# Patient Record
Sex: FEMALE | Race: BLACK OR AFRICAN AMERICAN | NOT HISPANIC OR LATINO | Employment: UNEMPLOYED | URBAN - METROPOLITAN AREA
[De-identification: names, ages, dates, MRNs, and addresses within clinical notes are randomized per-mention and may not be internally consistent; named-entity substitution may affect disease eponyms.]

---

## 2018-03-16 ENCOUNTER — OFFICE VISIT (OUTPATIENT)
Dept: OBSTETRICS AND GYNECOLOGY | Facility: CLINIC | Age: 43
End: 2018-03-16
Payer: MEDICAID

## 2018-03-16 VITALS
BODY MASS INDEX: 48.74 KG/M2 | DIASTOLIC BLOOD PRESSURE: 86 MMHG | HEIGHT: 65 IN | SYSTOLIC BLOOD PRESSURE: 132 MMHG | WEIGHT: 292.56 LBS

## 2018-03-16 DIAGNOSIS — Z11.3 SCREENING FOR STD (SEXUALLY TRANSMITTED DISEASE): ICD-10-CM

## 2018-03-16 DIAGNOSIS — Z12.39 SCREENING FOR BREAST CANCER: ICD-10-CM

## 2018-03-16 DIAGNOSIS — Z12.4 SCREENING FOR CERVICAL CANCER: ICD-10-CM

## 2018-03-16 DIAGNOSIS — N92.6 IRREGULAR BLEEDING: ICD-10-CM

## 2018-03-16 DIAGNOSIS — Z01.419 WELL WOMAN EXAM WITH ROUTINE GYNECOLOGICAL EXAM: Primary | ICD-10-CM

## 2018-03-16 PROCEDURE — 88305 TISSUE EXAM BY PATHOLOGIST: CPT | Mod: 26,,, | Performed by: PATHOLOGY

## 2018-03-16 PROCEDURE — 88305 TISSUE EXAM BY PATHOLOGIST: CPT | Performed by: PATHOLOGY

## 2018-03-16 PROCEDURE — 87491 CHLMYD TRACH DNA AMP PROBE: CPT

## 2018-03-16 PROCEDURE — 99386 PREV VISIT NEW AGE 40-64: CPT | Mod: S$PBB,,, | Performed by: OBSTETRICS & GYNECOLOGY

## 2018-03-16 PROCEDURE — 88175 CYTOPATH C/V AUTO FLUID REDO: CPT

## 2018-03-16 PROCEDURE — 58100 BIOPSY OF UTERUS LINING: CPT | Mod: PBBFAC,PO | Performed by: OBSTETRICS & GYNECOLOGY

## 2018-03-16 PROCEDURE — 99203 OFFICE O/P NEW LOW 30 MIN: CPT | Mod: PBBFAC,PO | Performed by: OBSTETRICS & GYNECOLOGY

## 2018-03-16 PROCEDURE — 58100 BIOPSY OF UTERUS LINING: CPT | Mod: S$PBB,,, | Performed by: OBSTETRICS & GYNECOLOGY

## 2018-03-16 PROCEDURE — 99999 PR PBB SHADOW E&M-NEW PATIENT-LVL III: CPT | Mod: PBBFAC,,, | Performed by: OBSTETRICS & GYNECOLOGY

## 2018-03-16 PROCEDURE — 87624 HPV HI-RISK TYP POOLED RSLT: CPT

## 2018-03-16 RX ORDER — BIOTIN 1 MG
1000 TABLET ORAL 3 TIMES DAILY
COMMUNITY
End: 2018-06-19

## 2018-03-16 RX ORDER — METFORMIN HYDROCHLORIDE 750 MG/1
750 TABLET, EXTENDED RELEASE ORAL
COMMUNITY
End: 2022-09-26

## 2018-03-16 RX ORDER — ACETAMINOPHEN 500 MG
5000 TABLET ORAL DAILY
COMMUNITY

## 2018-03-16 RX ORDER — ASPIRIN 81 MG/1
81 TABLET ORAL DAILY
COMMUNITY
End: 2018-07-09

## 2018-03-16 RX ORDER — AMITRIPTYLINE HYDROCHLORIDE 50 MG/1
50 TABLET, FILM COATED ORAL NIGHTLY
COMMUNITY
End: 2018-07-09

## 2018-03-16 RX ORDER — BUTALBITAL, ACETAMINOPHEN, CAFFEINE AND CODEINE PHOSPHATE 50; 325; 40; 30 MG/1; MG/1; MG/1; MG/1
CAPSULE ORAL
COMMUNITY
End: 2018-06-19 | Stop reason: SDUPTHER

## 2018-03-16 RX ORDER — GARLIC 1000 MG
CAPSULE ORAL
COMMUNITY
End: 2021-02-23

## 2018-03-16 RX ORDER — ATORVASTATIN CALCIUM 10 MG/1
10 TABLET, FILM COATED ORAL DAILY
COMMUNITY
End: 2018-06-19

## 2018-03-16 RX ORDER — CYCLOBENZAPRINE HCL 10 MG
10 TABLET ORAL 3 TIMES DAILY PRN
COMMUNITY
End: 2021-02-23

## 2018-03-16 RX ORDER — TOPIRAMATE 100 MG/1
100 TABLET, FILM COATED ORAL 2 TIMES DAILY
COMMUNITY

## 2018-03-16 RX ORDER — NAPROXEN 500 MG/1
500 TABLET ORAL 2 TIMES DAILY
COMMUNITY
End: 2018-07-09

## 2018-03-16 RX ORDER — LISINOPRIL AND HYDROCHLOROTHIAZIDE 12.5; 2 MG/1; MG/1
1 TABLET ORAL DAILY
COMMUNITY
End: 2022-09-26

## 2018-03-16 RX ORDER — GABAPENTIN 400 MG/1
600 CAPSULE ORAL 3 TIMES DAILY
COMMUNITY
End: 2022-09-26

## 2018-03-16 NOTE — PATIENT INSTRUCTIONS
Endometrial Biopsy    Endometrial biopsy is a procedure used to study the endometrium (lining of the uterus). It is usually done in your health care providers office. During the biopsy, small tissue samples are taken from inside the uterus. These are then sent to a lab for study. If any problems are found, you and your health care provider will discuss treatment options. The biopsy usually takes only a few minutes, and you can often go back to your normal routine as soon as the procedure is over.  Reasons for the procedure  Endometrial biopsy may help pinpoint the cause of certain problems. These include:  · Bleeding after menopause  · Heavy or irregular menstrual periods  · Bleeding associated with hormone therapy  · Prolonged bleeding  · Abnormal Pap test results  · Having certain types of cancer  · Trouble getting pregnant (fertility problems)  What are the risks?  Problems with endometrial biopsy are rare, but can include:  · Bleeding  · Infection  · Damage to the uterine wall (very rare)  Getting ready for the procedure  Your health care provider will ask about your health and any medicines you take, like blood thinners. Before your biopsy, you may have tests to make sure youre not pregnant or have an infection. You may also be asked to sign a consent form. A day or 2 before the procedure:   · Avoid using creams or other vaginal medicines.  · Avoid douching.  · Ask your health care provider if you should take pain medicines shortly before the test.  During the biopsy  During the biopsy, you will likely experience the following:  · You will be asked to lie on an exam table with your knees bent, just as you do for a Pap test.  · You may have a brief pelvic exam. An instrument called a speculum is then inserted into the vagina to hold it open.  · An antiseptic solution may be applied to the cervix. The cervix may also be numbed with an anesthetic or dilated to widen the opening.  · A small tube is passed  through the cervix into the uterus.  · It is normal to feel some cramping when the tube is inserted. But tell your health care provider if you have severe cramping or are very uncomfortable.  · Using mild suction, samples are taken from the uterine lining. You may feel pinching or additional cramping when this is done.  · The tube and speculum are then removed and the samples are sent to a lab for study.  After the procedure  After the procedure, you may experience the following:  · If you feel lightheaded or dizzy, you can rest on the table until youre ready to get dressed.  · For a few hours, you may feel some mild cramping. This can usually be relieved with over-the-counter pain medicines.  · You may have some bleeding for a few days. Use pads instead of tampons.  · Dont douche or use any vaginal medicines unless your health care provider says its OK.  · Ask your health care provider when its OK to have sex again.  Follow-up care  It will take about a week for the biopsy results to come back from the lab. Then you and your health care provider can discuss the results. These may show that no treatment is required. Or, you may be scheduled for a follow-up appointment and more tests. If your biopsy was done for fertility problems, be sure to record the day when your next period begins.     Call your health care provider   Contact your health care provider if you have any of the following:  · Heavy bleeding (more than a pad an hour for 2 hours).  · Severe cramping or increasing pain.  · Fever over 100.4°F (38.0°C)  · Foul-smelling or unusual vaginal discharge.   Date Last Reviewed: 5/10/2015  © 1356-5394 [employer]. [address] [place] [address], [place] [address]. All rights reserved. This information is not intended as a substitute for professional medical care. Always follow your healthcare professional's instructions.

## 2018-03-16 NOTE — PROGRESS NOTES
GYNECOLOGY OFFICE NOTE    Reason for visit: annual    HPI: Pt is a 42 y.o.  female  who presents for annual. Cycle: menarche- 12, Interval- variable may skip 3 months- x 10yrs, Duration- 4 days, Flow- normal, reports dysmenorrhea (alleviated with naproxen/ibuprofen). States she didn't have a cycle since Dec. Took provera and had spotting this month but has been bleeding variable since. She is not sexually active.  She uses no method for contraception. She does desire STI screening. She denies vaginal discharge.  Last pap: , reports hx of abnormal- had normal biopsy. Desires annual paps.    Past Medical History:   Diagnosis Date    Diabetes mellitus     Hyperlipidemia     Hypertension        Past Surgical History:   Procedure Laterality Date    CHOLECYSTECTOMY  1998    UMBILICAL HERNIA REPAIR  2001       Family History   Problem Relation Age of Onset    Breast cancer Mother     Breast cancer Maternal Cousin     Breast cancer Maternal Aunt     Colon cancer Neg Hx     Ovarian cancer Neg Hx        Social History   Substance Use Topics    Smoking status: Never Smoker    Smokeless tobacco: Never Used    Alcohol use Yes      Comment: Socially       OB History    Para Term  AB Living   2 2       2   SAB TAB Ectopic Multiple Live Births           2      # Outcome Date GA Lbr Aurelio/2nd Weight Sex Delivery Anes PTL Lv   2 Para     F Vag-Spont   VICKIE   1 Para     M Vag-Spont   VICKIE          Current Outpatient Prescriptions   Medication Sig    amitriptyline (ELAVIL) 50 MG tablet Take 50 mg by mouth every evening.    aspirin (ECOTRIN) 81 MG EC tablet Take 81 mg by mouth once daily.    atorvastatin (LIPITOR) 10 MG tablet Take 10 mg by mouth once daily.    biotin 1 mg tablet Take 1,000 mcg by mouth 3 (three) times daily.    butalbital-acetaminop-caf-cod -03-30 mg Cap Take by mouth as needed.    cholecalciferol, vitamin D3, (VITAMIN D3) 5,000 unit Tab Take 5,000 Units by mouth  "once daily.    cyclobenzaprine (FLEXERIL) 10 MG tablet Take 10 mg by mouth 3 (three) times daily as needed for Muscle spasms.    gabapentin (NEURONTIN) 400 MG capsule Take 400 mg by mouth 3 (three) times daily.    garlic 1,000 mg Cap Take by mouth.    lisinopril-hydrochlorothiazide (PRINZIDE,ZESTORETIC) 20-12.5 mg per tablet Take 1 tablet by mouth once daily.    metFORMIN (GLUCOPHAGE-XR) 750 MG 24 hr tablet Take 750 mg by mouth daily with breakfast.    naproxen (EC NAPROSYN) 500 MG EC tablet Take 500 mg by mouth 2 (two) times daily.    ranitidine (ZANTAC) 150 MG tablet Take 150 mg by mouth 2 (two) times daily.    topiramate (TOPAMAX) 100 MG tablet Take 100 mg by mouth 2 (two) times daily.    TRAMADOL HCL (TRAMADOL ORAL) Take by mouth as needed (50mg).     No current facility-administered medications for this visit.        Allergies: Patient has no known allergies.     /86   Ht 5' 5" (1.651 m)   Wt 132.7 kg (292 lb 8.8 oz)   LMP 03/08/2018 (Exact Date)   BMI 48.68 kg/m²     ROS:  GENERAL: Denies fever or chills.   SKIN: Denies rash or lesions.   HEAD: Denies head injury or headache.   CHEST: Denies chest pain or shortness of breath.   CARDIOVASCULAR: Denies palpitations or chest pain.   ABDOMEN: No abdominal pain, constipation, diarrhea, nausea, vomiting or rectal bleeding.   URINARY: No dysuria, hematuria, or burning on urination.  REPRODUCTIVE: See HPI.   BREASTS: Denies pain, lumps, or nipple discharge.   NEUROLOGIC: Denies syncope or weakness.     Physical Exam:  GENERAL: alert, appears stated age and cooperative  CHEST: Normal respiratory effort  HEART: S1 and S2 normal, regular rate and rhythm  NECK: normal appearance, no thyromegaly masses or tenderness  SKIN: no acne, striae, hirsutism  BREAST EXAM: breasts appear normal, no suspicious masses, no skin or nipple changes or axillary nodes  ABDOMEN: abdomen is soft without significant tenderness, masses, organomegaly or guarding, no hernias " noted  EXTERNAL GENITALIA:  normal general appearance  URETHRA: normal urethra, normal urethral meatus  VAGINA:  normal mucosa without prolapse or lesions, minimal blood in vault  CERVIX:  Normal  UTERUS:  exam limited by habitus  ADNEXA:  normal adnexa in size, nontender and no masses      DATE: March16th, 2018    TIME: 1000 AM    PROCEDURE: Endometrial biopsy    INDICATION: AUB-N    PATIENT CONSENT:     PRE ENDOMETRIAL BIOPSY COUNSELING:The patient was informed of the risk of bleeding, infection, uterine perforation and pain and that the test will rule-out endometrial cancer with accuracy greater than 95%. She was counseled on the alternatives to endometrial biopsy.  All of her questions were answered. Counseling lasted approximately 15 minutes and all her questions were answered.    Patient gives verbal and written consent    ANESTHESIA: None    PROCEDURE NOTE:  The cervix was visualized with a speculum and swabbed with Betadinex3.  A sterile endometrial pipelle was inserted into the uterus to a sound length of 9 cm. 2 passes were made with the pipelle and light amount of tissue was obtained. The specimen was placed in formalin and sent to Pathology for evaluation.     COMPLICATIONS: None    DISPOSITION: The patient tolerated the above procedure well.    POST ENDOMETRIAL BIOPSY COUNSELING:  - Manage post biopsy cramping with NSAIDs or Tylenol.  - Expect spotting or light bleeding for a few days.  - Report bleeding heavier than a period, fever > 101.0 F, worsening pain or a foul smelling vaginal discharge.    Diagnosis:  1. Well woman exam with routine gynecological exam    2. Irregular bleeding    3. Screening for STD (sexually transmitted disease)    4. Screening for breast cancer    5. Screening for cervical cancer        Plan:   1. Annual  2. F/u U/S but discussed differential- obesity, chronic anovulation.  Treatment options also discussed. Will await EMBX and U/S results  3. F/u gc/ct  4. MMG orderd  5.  Pap/hpv today    Orders Placed This Encounter    C. trachomatis/N. gonorrhoeae by AMP DNA Cervix    HPV High Risk Genotypes, PCR    Mammo Digital Screening Bilat with CAD    Liquid-based pap smear, screening    Tissue Specimen To Pathology, Obstetrics/Gynecology    US OB/GYN Procedure ([facility])       Patient was counseled today on the new ACS guidelines for cervical cytology screening as well as the current recommendations for breast cancer screening. She was counseled to follow up with her PCP for other routine health maintenance.     Follow-up for transvaginal ultrasound, results visit.      Tiffanie Rhodes MD  OB/GYN  Pager: [phone]

## 2018-03-17 LAB
C TRACH DNA SPEC QL NAA+PROBE: NOT DETECTED
N GONORRHOEA DNA SPEC QL NAA+PROBE: NOT DETECTED

## 2018-03-22 LAB
HPV16 AG SPEC QL: NEGATIVE
HPV16+18+H RISK 12 DNA CVX-IMP: NEGATIVE
HPV18 DNA SPEC QL NAA+PROBE: NEGATIVE

## 2018-03-23 ENCOUNTER — PROCEDURE VISIT (OUTPATIENT)
Dept: OBSTETRICS AND GYNECOLOGY | Facility: CLINIC | Age: 43
End: 2018-03-23
Payer: MEDICAID

## 2018-03-23 DIAGNOSIS — N92.6 IRREGULAR BLEEDING: ICD-10-CM

## 2018-03-23 PROCEDURE — 76830 TRANSVAGINAL US NON-OB: CPT | Mod: 26,S$PBB,, | Performed by: OBSTETRICS & GYNECOLOGY

## 2018-03-23 PROCEDURE — 76830 TRANSVAGINAL US NON-OB: CPT | Mod: PBBFAC,PO

## 2018-03-23 RX ORDER — CLOTRIMAZOLE AND BETAMETHASONE DIPROPIONATE 10; .64 MG/G; MG/G
CREAM TOPICAL
Qty: 15 G | Refills: 1 | Status: SHIPPED | OUTPATIENT
Start: 2018-03-23 | End: 2019-03-23

## 2018-03-23 NOTE — TELEPHONE ENCOUNTER
Spoke with pt in clinic after her U/S. Informed pt  won't be in clinic today due to an accident. Informed pt I will be in contact next week on Monday with a new appt date and time. Pt reports vaginal itching externally, referred pt to  On call, Lotrisone cream called in. Patient verbalized understanding.

## 2018-03-24 ENCOUNTER — HOSPITAL ENCOUNTER (OUTPATIENT)
Dept: RADIOLOGY | Facility: HOSPITAL | Age: 43
Discharge: HOME | End: 2018-03-24
Attending: OBSTETRICS & GYNECOLOGY
Payer: MEDICAID

## 2018-03-24 DIAGNOSIS — Z12.39 SCREENING FOR BREAST CANCER: ICD-10-CM

## 2018-03-24 PROCEDURE — 77067 SCR MAMMO BI INCL CAD: CPT | Mod: 26,,, | Performed by: RADIOLOGY

## 2018-03-24 PROCEDURE — 77067 SCR MAMMO BI INCL CAD: CPT | Mod: TC

## 2018-03-27 ENCOUNTER — TELEPHONE (OUTPATIENT)
Dept: RADIOLOGY | Facility: HOSPITAL | Age: 43
End: 2018-03-27

## 2018-04-09 ENCOUNTER — PATIENT MESSAGE (OUTPATIENT)
Dept: OBSTETRICS AND GYNECOLOGY | Facility: CLINIC | Age: 43
End: 2018-04-09

## 2018-04-09 ENCOUNTER — TELEPHONE (OUTPATIENT)
Dept: RADIOLOGY | Facility: HOSPITAL | Age: 43
End: 2018-04-09

## 2018-04-09 ENCOUNTER — HOSPITAL ENCOUNTER (OUTPATIENT)
Dept: RADIOLOGY | Facility: HOSPITAL | Age: 43
Discharge: HOME | End: 2018-04-09
Attending: OBSTETRICS & GYNECOLOGY
Payer: MEDICAID

## 2018-04-09 DIAGNOSIS — R92.8 ABNORMAL MAMMOGRAM: ICD-10-CM

## 2018-04-09 PROCEDURE — 77061 BREAST TOMOSYNTHESIS UNI: CPT | Mod: TC

## 2018-04-09 PROCEDURE — 77061 BREAST TOMOSYNTHESIS UNI: CPT | Mod: 26,,, | Performed by: RADIOLOGY

## 2018-04-09 PROCEDURE — 77065 DX MAMMO INCL CAD UNI: CPT | Mod: 26,,, | Performed by: RADIOLOGY

## 2018-04-09 PROCEDURE — 77065 DX MAMMO INCL CAD UNI: CPT | Mod: TC

## 2018-04-10 NOTE — TELEPHONE ENCOUNTER
Pt states she is not interested in pills and would like to know what surgery would be done. Pt is also inquiring about her U/S results.

## 2018-04-16 ENCOUNTER — TELEPHONE (OUTPATIENT)
Dept: RADIOLOGY | Facility: HOSPITAL | Age: 43
End: 2018-04-16

## 2018-04-16 NOTE — TELEPHONE ENCOUNTER
Spoke with patient. Reviewed breast biopsy procedure and reviewed instructions for breast biopsy. Patient expressed understanding and all questions were answered. Provided patient with my phone number to call for any further concerns or questions.   Patient scheduled breast biopsy at the [facility] on 4/23/18.

## 2018-04-23 ENCOUNTER — HOSPITAL ENCOUNTER (OUTPATIENT)
Dept: RADIOLOGY | Facility: HOSPITAL | Age: 43
Discharge: HOME | End: 2018-04-23
Attending: OBSTETRICS & GYNECOLOGY
Payer: MEDICAID

## 2018-04-23 DIAGNOSIS — R92.8 ABNORMAL MAMMOGRAM: ICD-10-CM

## 2018-04-23 PROCEDURE — 88305 TISSUE EXAM BY PATHOLOGIST: CPT | Mod: 26,,, | Performed by: PATHOLOGY

## 2018-04-23 PROCEDURE — 19081 BX BREAST 1ST LESION STRTCTC: CPT | Mod: RT,,, | Performed by: RADIOLOGY

## 2018-04-23 PROCEDURE — 27201044 MAMMO BREAST STEREOTACTIC BREAST BIOPSY RIGHT: Mod: PO

## 2018-04-23 PROCEDURE — 19081 BX BREAST 1ST LESION STRTCTC: CPT | Mod: TC,PO,RT

## 2018-04-23 PROCEDURE — 88305 TISSUE EXAM BY PATHOLOGIST: CPT | Performed by: PATHOLOGY

## 2018-04-24 ENCOUNTER — TELEPHONE (OUTPATIENT)
Dept: RADIOLOGY | Facility: HOSPITAL | Age: 43
End: 2018-04-24

## 2018-06-06 ENCOUNTER — TELEPHONE (OUTPATIENT)
Dept: OBSTETRICS AND GYNECOLOGY | Facility: CLINIC | Age: 43
End: 2018-06-06

## 2018-06-06 NOTE — TELEPHONE ENCOUNTER
Returned call, apologized about the delay, pt states she also had some things going on in her life that took her focus away from treatment here in clinic. Pt states she is not interested in pills or the IUD and would like more information on surgery. Pt is also requesting the results from her recent U/S, she states when the tech was doing the procedure she seemed really concerned but didn't give her much information and it scared her.

## 2018-06-06 NOTE — TELEPHONE ENCOUNTER
----- Message from Kiesha Laureano sent at 6/6/2018  3:50 PM CDT -----  Contact: self, [phone]  Patient states she is very disappointed. States she was told by nurse on 4/9 she would get a call back and has yet to be called. Please advise.

## 2018-06-07 NOTE — TELEPHONE ENCOUNTER
Contacted pt and scheduled for appt to discuss surgery options on 6/12 at 1:15pm. Patient verbalized understanding.

## 2018-06-07 NOTE — TELEPHONE ENCOUNTER
I sent her ultrasound results and explanation through [employer] so she should have access to them there and I also explained that if she wanted to discuss surgical options I am more than happy to see her in clinic     Tiffanie Rhodes MD, FACOG  OB/GYN  Pager: [phone]'

## 2018-06-19 ENCOUNTER — OFFICE VISIT (OUTPATIENT)
Dept: OBSTETRICS AND GYNECOLOGY | Facility: CLINIC | Age: 43
End: 2018-06-19
Payer: MEDICAID

## 2018-06-19 VITALS
DIASTOLIC BLOOD PRESSURE: 86 MMHG | WEIGHT: 283.31 LBS | SYSTOLIC BLOOD PRESSURE: 128 MMHG | BODY MASS INDEX: 47.14 KG/M2

## 2018-06-19 DIAGNOSIS — N92.6 IRREGULAR BLEEDING: ICD-10-CM

## 2018-06-19 DIAGNOSIS — N83.201 RIGHT OVARIAN CYST: Primary | ICD-10-CM

## 2018-06-19 PROCEDURE — 99999 PR PBB SHADOW E&M-EST. PATIENT-LVL III: CPT | Mod: PBBFAC,,, | Performed by: OBSTETRICS & GYNECOLOGY

## 2018-06-19 PROCEDURE — 99213 OFFICE O/P EST LOW 20 MIN: CPT | Mod: PBBFAC,PO | Performed by: OBSTETRICS & GYNECOLOGY

## 2018-06-19 PROCEDURE — 99212 OFFICE O/P EST SF 10 MIN: CPT | Mod: S$PBB,,, | Performed by: OBSTETRICS & GYNECOLOGY

## 2018-06-19 RX ORDER — CETIRIZINE HYDROCHLORIDE 10 MG/1
TABLET ORAL
COMMUNITY
Start: 2018-04-18 | End: 2021-04-06

## 2018-06-19 RX ORDER — LANCETS 30 GAUGE
EACH MISCELLANEOUS
COMMUNITY
Start: 2018-04-12

## 2018-06-19 RX ORDER — BUTALBITAL, ACETAMINOPHEN AND CAFFEINE 50; 325; 40 MG/1; MG/1; MG/1
TABLET ORAL
COMMUNITY
Start: 2018-04-30 | End: 2023-10-03

## 2018-06-19 RX ORDER — PIOGLITAZONEHYDROCHLORIDE 30 MG/1
TABLET ORAL
COMMUNITY
Start: 2018-06-18 | End: 2019-05-15

## 2018-06-19 RX ORDER — MEDROXYPROGESTERONE ACETATE 10 MG/1
10 TABLET ORAL DAILY
Qty: 30 TABLET | Refills: 4 | Status: SHIPPED | OUTPATIENT
Start: 2018-06-19 | End: 2018-07-09

## 2018-06-19 NOTE — PROGRESS NOTES
GYNECOLOGY OFFICE NOTE    Reason for visit:follow-up    HPI: Pt is a 43 y.o.  female  who presents for f/u from U/S for irregular bleeding. No cycle since April- duration 5 days. We discussed right ovarian cyst and need for repeat U/S.we discussed again chronic anovulation and therapy options (HTN)- progestin only pills (continous vs 10days vs IUD).     Previous HPI: Cycle: menarche- 12, Interval- variable may skip 3 months- x 10yrs, Duration- 4 days, Flow- normal, reports dysmenorrhea (alleviated with naproxen/ibuprofen). States she didn't have a cycle since Dec. Took provera and had spotting this month but has been bleeding variable since. She is not sexually active.  She uses no method for contraception. She does desire STI screening. She denies vaginal discharge.  Last pap: , reports hx of abnormal- had normal biopsy. Desires annual paps.    Past Medical History:   Diagnosis Date    Diabetes mellitus     Hyperlipidemia     Hypertension        Past Surgical History:   Procedure Laterality Date    CHOLECYSTECTOMY  1998    UMBILICAL HERNIA REPAIR  2001       Family History   Problem Relation Age of Onset    Breast cancer Mother     Breast cancer Maternal Cousin     Breast cancer Maternal Aunt     Colon cancer Neg Hx     Ovarian cancer Neg Hx        Social History   Substance Use Topics    Smoking status: Never Smoker    Smokeless tobacco: Never Used    Alcohol use Yes      Comment: Socially       OB History    Para Term  AB Living   4 4 2     2   SAB TAB Ectopic Multiple Live Births           2      # Outcome Date GA Lbr Aurelio/2nd Weight Sex Delivery Anes PTL Lv   4 Term            3 Term            2 Para     F Vag-Spont   VICKIE   1 Para     M Vag-Spont   VICKIE          Current Outpatient Prescriptions   Medication Sig    ACCU-CHEK FASTCLIX Misc     amitriptyline (ELAVIL) 50 MG tablet Take 50 mg by mouth every evening.    aspirin (ECOTRIN) 81 MG EC tablet Take 81 mg by  mouth once daily.    butalbital-acetaminophen-caffeine -40 mg (FIORICET, ESGIC) -40 mg per tablet     cetirizine (ZYRTEC) 10 MG tablet     cholecalciferol, vitamin D3, (VITAMIN D3) 5,000 unit Tab Take 5,000 Units by mouth once daily.    clotrimazole-betamethasone 1-0.05% (LOTRISONE) cream Apply to affected area 2 times daily    cyclobenzaprine (FLEXERIL) 10 MG tablet Take 10 mg by mouth 3 (three) times daily as needed for Muscle spasms.    gabapentin (NEURONTIN) 400 MG capsule Take 400 mg by mouth 3 (three) times daily.    garlic 1,000 mg Cap Take by mouth.    lisinopril-hydrochlorothiazide (PRINZIDE,ZESTORETIC) 20-12.5 mg per tablet Take 1 tablet by mouth once daily.    metFORMIN (GLUCOPHAGE-XR) 750 MG 24 hr tablet Take 750 mg by mouth daily with breakfast.    naproxen (EC NAPROSYN) 500 MG EC tablet Take 500 mg by mouth 2 (two) times daily.    ranitidine (ZANTAC) 150 MG tablet Take 150 mg by mouth 2 (two) times daily.    topiramate (TOPAMAX) 100 MG tablet Take 100 mg by mouth 2 (two) times daily.    TRAMADOL HCL (TRAMADOL ORAL) Take by mouth as needed (50mg).    medroxyPROGESTERone (PROVERA) 10 MG tablet Take 1 tablet (10 mg total) by mouth once daily.    ONETOUCH ULTRA BLUE TEST STRIP Strp     ONETOUCH ULTRA2 kit     pioglitazone (ACTOS) 30 MG tablet      No current facility-administered medications for this visit.        Allergies: Patient has no known allergies.     /86   Wt 128.5 kg (283 lb 4.7 oz)   LMP 04/15/2018 (Exact Date)   BMI 47.14 kg/m²     ROS:  GENERAL: Denies fever or chills.   SKIN: Denies rash or lesions.   HEAD: Denies head injury or headache.   CHEST: Denies chest pain or shortness of breath.   CARDIOVASCULAR: Denies palpitations or chest pain.   ABDOMEN: No abdominal pain, constipation, diarrhea, nausea, vomiting or rectal bleeding.   URINARY: No dysuria, hematuria, or burning on urination.  REPRODUCTIVE: See HPI.   BREASTS: Denies pain, lumps, or nipple  discharge.   NEUROLOGIC: Denies syncope or weakness.     Physical Exam:  GENERAL: alert, appears stated age and cooperative  CHEST: Normal respiratory effort  HEART: S1 and S2 normal, regular rate and rhythm  NECK: normal appearance, no thyromegaly masses or tenderness  SKIN: no acne, striae, hirsutism  Talk only    Diagnosis:  1. Right ovarian cyst    2. Irregular bleeding        Plan:   1.F/U U/s ordered- asymptomatic  2. Discussed provera and rx sent    Orders Placed This Encounter    medroxyPROGESTERone (PROVERA) 10 MG tablet    US OB/GYN Procedure ([facility])       Patient was counseled today on the new ACS guidelines for cervical cytology screening as well as the current recommendations for breast cancer screening. She was counseled to follow up with her PCP for other routine health maintenance.     Follow-up for ultrasound.      Tiffanie Rhodes MD  OB/GYN  Pager: [phone]

## 2018-07-09 ENCOUNTER — PROCEDURE VISIT (OUTPATIENT)
Dept: OBSTETRICS AND GYNECOLOGY | Facility: CLINIC | Age: 43
End: 2018-07-09
Payer: MEDICAID

## 2018-07-09 ENCOUNTER — OFFICE VISIT (OUTPATIENT)
Dept: OBSTETRICS AND GYNECOLOGY | Facility: CLINIC | Age: 43
End: 2018-07-09
Payer: MEDICAID

## 2018-07-09 ENCOUNTER — LAB VISIT (OUTPATIENT)
Dept: LAB | Facility: HOSPITAL | Age: 43
End: 2018-07-09
Attending: OBSTETRICS & GYNECOLOGY
Payer: MEDICAID

## 2018-07-09 VITALS
BODY MASS INDEX: 46.58 KG/M2 | WEIGHT: 279.56 LBS | SYSTOLIC BLOOD PRESSURE: 120 MMHG | HEIGHT: 65 IN | DIASTOLIC BLOOD PRESSURE: 80 MMHG

## 2018-07-09 DIAGNOSIS — N83.8 OVARIAN MASS, RIGHT: Primary | ICD-10-CM

## 2018-07-09 DIAGNOSIS — N83.201 RIGHT OVARIAN CYST: ICD-10-CM

## 2018-07-09 DIAGNOSIS — N83.201 RIGHT OVARIAN CYST: Primary | ICD-10-CM

## 2018-07-09 LAB — CANCER AG125 SERPL-ACNC: 29 U/ML

## 2018-07-09 PROCEDURE — 99214 OFFICE O/P EST MOD 30 MIN: CPT | Mod: PBBFAC,PO | Performed by: OBSTETRICS & GYNECOLOGY

## 2018-07-09 PROCEDURE — 99212 OFFICE O/P EST SF 10 MIN: CPT | Mod: S$PBB,25,, | Performed by: OBSTETRICS & GYNECOLOGY

## 2018-07-09 PROCEDURE — 76830 TRANSVAGINAL US NON-OB: CPT | Mod: PBBFAC,PO

## 2018-07-09 PROCEDURE — 86304 IMMUNOASSAY TUMOR CA 125: CPT

## 2018-07-09 PROCEDURE — 36415 COLL VENOUS BLD VENIPUNCTURE: CPT

## 2018-07-09 PROCEDURE — 76830 TRANSVAGINAL US NON-OB: CPT | Mod: 26,S$PBB,, | Performed by: OBSTETRICS & GYNECOLOGY

## 2018-07-09 PROCEDURE — 99999 PR PBB SHADOW E&M-EST. PATIENT-LVL IV: CPT | Mod: PBBFAC,,, | Performed by: OBSTETRICS & GYNECOLOGY

## 2018-07-09 RX ORDER — TOPIRAMATE 100 MG/1
2 TABLET, FILM COATED ORAL
COMMUNITY
End: 2018-07-09

## 2018-07-09 RX ORDER — PYRIDOXINE HCL (VITAMIN B6) 100 MG
TABLET ORAL
COMMUNITY
End: 2019-05-15

## 2018-07-09 RX ORDER — MELOXICAM 7.5 MG/1
1 TABLET ORAL
COMMUNITY
End: 2018-07-09

## 2018-07-09 RX ORDER — BUTALBITAL, ACETAMINOPHEN AND CAFFEINE 300; 40; 50 MG/1; MG/1; MG/1
1-2 CAPSULE ORAL
COMMUNITY
End: 2018-07-09

## 2018-07-09 RX ORDER — METFORMIN HYDROCHLORIDE 750 MG/1
1 TABLET, EXTENDED RELEASE ORAL
COMMUNITY
End: 2018-07-09

## 2018-07-09 RX ORDER — GARLIC 1000 MG
CAPSULE ORAL
COMMUNITY
End: 2018-07-09

## 2018-07-09 RX ORDER — LISINOPRIL AND HYDROCHLOROTHIAZIDE 12.5; 2 MG/1; MG/1
1 TABLET ORAL
COMMUNITY
End: 2018-07-09

## 2018-07-09 RX ORDER — GABAPENTIN 400 MG/1
1 CAPSULE ORAL
COMMUNITY
End: 2018-07-09

## 2018-07-09 RX ORDER — TRAMADOL HYDROCHLORIDE 50 MG/1
1 TABLET ORAL
COMMUNITY
End: 2019-05-15

## 2018-07-09 RX ORDER — ATORVASTATIN CALCIUM 10 MG/1
1 TABLET, FILM COATED ORAL
COMMUNITY
End: 2018-07-09

## 2018-07-09 RX ORDER — NAPROXEN SODIUM 220 MG/1
TABLET, FILM COATED ORAL
COMMUNITY
End: 2018-07-09

## 2018-07-09 RX ORDER — NIACIN 500 MG/1
500 TABLET, EXTENDED RELEASE ORAL 2 TIMES DAILY WITH MEALS
COMMUNITY
End: 2021-04-06

## 2018-07-09 RX ORDER — ACETAMINOPHEN AND PHENYLEPHRINE HCL 325; 5 MG/1; MG/1
TABLET ORAL
COMMUNITY
End: 2018-07-09

## 2018-07-09 RX ORDER — ERGOCALCIFEROL 1.25 MG/1
1 CAPSULE ORAL
COMMUNITY
End: 2021-02-23

## 2018-07-09 RX ORDER — SAXAGLIPTIN 5 MG/1
1 TABLET, FILM COATED ORAL
COMMUNITY
End: 2018-07-09

## 2018-07-09 RX ORDER — ENOXAPARIN SODIUM 150 MG/ML
INJECTION SUBCUTANEOUS
COMMUNITY
Start: 2018-06-27

## 2018-07-09 RX ORDER — AMITRIPTYLINE HYDROCHLORIDE 50 MG/1
1 TABLET, FILM COATED ORAL
COMMUNITY

## 2018-07-09 NOTE — PROGRESS NOTES
GYNECOLOGY OFFICE NOTE    Reason for visit: U/S follow-up    HPI: Pt is a 43 y.o.  female  who presents for f/u from right ovarian cyst. Pt started provera 18 for irregular bleeding (oligomenorrhea). Over the next couple of days started with right leg pain and difficulty breathing.  On   went to ED at [facility] and was admitted for a PE and started anticoagulation. Also at that time had a CT scan of the chest that showed right axillary lymph nodes and is scheduled for biopsy with surgeon at [facility]. MMG in April showed calcifications that were negative for atypia or malignancy. Pt trying to figure everything out. States she only had minor spotting after stopping the provera. We discussed in detail U/S finding of persistent ROV cyst-complex and differential along with mangement options. Pt still asymptomatic from RLQ pain but states she does suffer from chronic back pain. We discussed differential of ovarian cancer but no real way of knowing except for removal. WE also talked about the utility of Ca125 and that its nonspecific and could be elevated even with recent PE.     Previous HPI: Cycle: menarche- 12, Interval- variable may skip 3 months- x 10yrs, Duration- 4 days, Flow- normal, reports dysmenorrhea (alleviated with naproxen/ibuprofen). States she didn't have a cycle since Dec. Took provera and had spotting this month but has been bleeding variable since. She is not sexually active.  She uses no method for contraception. She does desire STI screening. She denies vaginal discharge.  Last pap: , reports hx of abnormal- had normal biopsy. Desires annual paps.    U/S:3/2018:   Size 65.0 mm x 39.0 mm x 46.0 mm. Vol 61.057 cmÂ³. Rt Ovary cyst- complex.    U/S:18: 75.0 mm x 49.0 mm x 60.0 mm. Vol 115.454 cmÂ³. Right ovarian cyst- complex    Past Medical History:   Diagnosis Date    Diabetes mellitus     Hyperlipidemia     Hypertension     Pulmonary embolism 2018       Past Surgical  History:   Procedure Laterality Date    CHOLECYSTECTOMY  1998    UMBILICAL HERNIA REPAIR  2001    mesh placed       Family History   Problem Relation Age of Onset    Breast cancer Mother     Breast cancer Maternal Cousin     Breast cancer Maternal Aunt     Colon cancer Neg Hx     Ovarian cancer Neg Hx        Social History   Substance Use Topics    Smoking status: Never Smoker    Smokeless tobacco: Never Used    Alcohol use Yes      Comment: Socially       OB History    Para Term  AB Living   4 4 2     2   SAB TAB Ectopic Multiple Live Births           2      # Outcome Date GA Lbr Aurelio/2nd Weight Sex Delivery Anes PTL Lv   4 Term            3 Term            2 Para     F Vag-Spont   VICKIE   1 Para     M Vag-Spont   VICKIE          Current Outpatient Prescriptions   Medication Sig    ACCU-CHEK FASTCLIX Misc     amitriptyline (ELAVIL) 50 MG tablet Take 1 tablet by mouth.    butalbital-acetaminophen-caffeine -40 mg (FIORICET, ESGIC) -40 mg per tablet     cetirizine (ZYRTEC) 10 MG tablet     cholecalciferol, vitamin D3, (VITAMIN D3) 5,000 unit Tab Take 5,000 Units by mouth once daily.    clotrimazole-betamethasone 1-0.05% (LOTRISONE) cream Apply to affected area 2 times daily    cyclobenzaprine (FLEXERIL) 10 MG tablet Take 10 mg by mouth 3 (three) times daily as needed for Muscle spasms.    enoxaparin (LOVENOX) 120 mg/0.8 mL Syrg     gabapentin (NEURONTIN) 400 MG capsule Take 400 mg by mouth 3 (three) times daily.    garlic 1,000 mg Cap Take by mouth.    lisinopril-hydrochlorothiazide (PRINZIDE,ZESTORETIC) 20-12.5 mg per tablet Take 1 tablet by mouth once daily.    metFORMIN (GLUCOPHAGE-XR) 750 MG 24 hr tablet Take 750 mg by mouth daily with breakfast.    niacin (SLO-NIACIN) 500 mg tablet Take 500 mg by mouth 2 (two) times daily with meals.    ONETOUCH ULTRA BLUE TEST STRIP Strp     ONETOUCH ULTRA2 kit     pioglitazone (ACTOS) 30 MG tablet     pyridoxine, vitamin B6,  "(B-6) 100 MG Tab     ranitidine (ZANTAC) 150 MG tablet Take 150 mg by mouth 2 (two) times daily.    topiramate (TOPAMAX) 100 MG tablet Take 100 mg by mouth 2 (two) times daily.    traMADol (ULTRAM) 50 mg tablet Take 1 tablet by mouth.    ergocalciferol (ERGOCALCIFEROL) 50,000 unit Cap Take 1 capsule by mouth.     No current facility-administered medications for this visit.        Allergies: Patient has no known allergies.     /80   Ht 5' 5" (1.651 m)   Wt 126.8 kg (279 lb 8.7 oz)   LMP 06/29/2018   BMI 46.52 kg/m²     ROS:  GENERAL: Denies fever or chills.   SKIN: Denies rash or lesions.   HEAD: Denies head injury or headache.   CHEST: Denies chest pain or shortness of breath.   CARDIOVASCULAR: Denies palpitations or chest pain.   ABDOMEN: No abdominal pain, constipation, diarrhea, nausea, vomiting or rectal bleeding.   URINARY: No dysuria, hematuria, or burning on urination.  REPRODUCTIVE: See HPI.   BREASTS: Denies pain, lumps, or nipple discharge.   NEUROLOGIC: Denies syncope or weakness.     Physical Exam:  GENERAL: alert, appears stated age and cooperative  CHEST: Normal respiratory effort  HEART: S1 and S2 normal, regular rate and rhythm  NECK: normal appearance, no thyromegaly masses or tenderness  SKIN: no acne, striae, hirsutism  Talk only    Diagnosis:  1. Right ovarian cyst        Plan:   1.Discussed options for treatment- ovarian cystectomy vs oophorectomy). Pt with hx of umbilical hernia repaired with mesh. Either LUQ port placement for laparoscopy with risk of conversion to open procedure were discussed. Would request clearance first. ALLA from recent admission at [facility] and [facility]. Order for Ca125 placed. Will f/uy biopsy for R/axillary lymph nodes as well.     Orders Placed This Encounter           Patient was counseled today on the new ACS guidelines for cervical cytology screening as well as the current recommendations for breast cancer " screening. She was counseled to follow up with her PCP for other routine health maintenance.       Tiffanie Rhodes MD  OB/GYN  Pager: [phone]

## 2018-07-10 ENCOUNTER — TELEPHONE (OUTPATIENT)
Dept: OBSTETRICS AND GYNECOLOGY | Facility: HOSPITAL | Age: 43
End: 2018-07-10

## 2018-07-13 ENCOUNTER — PATIENT MESSAGE (OUTPATIENT)
Dept: OBSTETRICS AND GYNECOLOGY | Facility: CLINIC | Age: 43
End: 2018-07-13

## 2018-07-13 NOTE — TELEPHONE ENCOUNTER
Sure-We can definitely look into scheduling that in Aug if she would like. I would want her PCP to give her clearance though for surgery prior to scheduling.    Tiffanie Rhodes MD, FACOG  OB/GYN  Pager: [phone]

## 2018-08-15 ENCOUNTER — PATIENT MESSAGE (OUTPATIENT)
Dept: OBSTETRICS AND GYNECOLOGY | Facility: CLINIC | Age: 43
End: 2018-08-15

## 2018-08-17 NOTE — TELEPHONE ENCOUNTER
I called patient- no answer. Her cyst is more likely benign and she is asymptomatic from it so I would recommend getting everything handled with her breast first and then check in with me afterwards for scheduling removal.     Tiffanie Rhodes MD, FACOG  OB/GYN  Pager: [phone]

## 2018-11-02 ENCOUNTER — PATIENT MESSAGE (OUTPATIENT)
Dept: OBSTETRICS AND GYNECOLOGY | Facility: CLINIC | Age: 43
End: 2018-11-02

## 2018-11-02 DIAGNOSIS — R92.8 ABNORMALITY OF RIGHT BREAST ON SCREENING MAMMOGRAM: Primary | ICD-10-CM

## 2018-11-02 NOTE — TELEPHONE ENCOUNTER
She was seen for an annual in March of this year and I placed an order for a Mammogram then she had a biopsy of the right breast in April. She already has an order for right breast diagnostic mammogram that was signed in April. Can they use this order or do they need another. Its not time for her routine screening again until 3/2019. This is a 6 mo follow-up from her right breast. Additional order placed in case    Tiffanie Rhodes MD, FACOG  OB/GYN  Pager: [phone]

## 2019-01-10 ENCOUNTER — PATIENT MESSAGE (OUTPATIENT)
Dept: OBSTETRICS AND GYNECOLOGY | Facility: CLINIC | Age: 44
End: 2019-01-10

## 2019-01-10 DIAGNOSIS — N91.2 ABSENT MENSES: Primary | ICD-10-CM

## 2019-01-11 NOTE — TELEPHONE ENCOUNTER
Chemo can def be linked to this but orders placed. SHe can f/u after the labs are in to discuss if she wants.     Tiffanie Rhodes MD, FACOG  OB/GYN  Pager: [phone]

## 2019-01-17 ENCOUNTER — LAB VISIT (OUTPATIENT)
Dept: LAB | Facility: HOSPITAL | Age: 44
End: 2019-01-17
Attending: OBSTETRICS & GYNECOLOGY
Payer: MEDICAID

## 2019-01-17 DIAGNOSIS — N91.2 ABSENT MENSES: ICD-10-CM

## 2019-01-17 LAB
ESTRADIOL SERPL-MCNC: <10 PG/ML
FSH SERPL-ACNC: 21.3 MIU/ML

## 2019-01-17 PROCEDURE — 83001 ASSAY OF GONADOTROPIN (FSH): CPT

## 2019-01-17 PROCEDURE — 36415 COLL VENOUS BLD VENIPUNCTURE: CPT

## 2019-01-17 PROCEDURE — 82670 ASSAY OF TOTAL ESTRADIOL: CPT

## 2019-01-18 ENCOUNTER — PATIENT MESSAGE (OUTPATIENT)
Dept: OBSTETRICS AND GYNECOLOGY | Facility: CLINIC | Age: 44
End: 2019-01-18

## 2019-01-18 DIAGNOSIS — N92.6 IRREGULAR BLEEDING: Primary | ICD-10-CM

## 2019-01-18 NOTE — TELEPHONE ENCOUNTER
The labs show that her estrogen level could be an explanation for her irregular cycles. I would recommend repeating the levels to verify its indeed that low. This could mean that she is transitioning into menopause early.     Tiffanie Rhodes MD, FACOG  OB/GYN  Pager: [phone]

## 2019-01-25 ENCOUNTER — LAB VISIT (OUTPATIENT)
Dept: LAB | Facility: HOSPITAL | Age: 44
End: 2019-01-25
Attending: OBSTETRICS & GYNECOLOGY
Payer: MEDICAID

## 2019-01-25 DIAGNOSIS — N92.6 IRREGULAR BLEEDING: ICD-10-CM

## 2019-01-25 LAB
ESTRADIOL SERPL-MCNC: 11 PG/ML
FSH SERPL-ACNC: 20.5 MIU/ML

## 2019-01-25 PROCEDURE — 82670 ASSAY OF TOTAL ESTRADIOL: CPT

## 2019-01-25 PROCEDURE — 83001 ASSAY OF GONADOTROPIN (FSH): CPT

## 2019-01-25 PROCEDURE — 36415 COLL VENOUS BLD VENIPUNCTURE: CPT

## 2019-02-20 ENCOUNTER — PATIENT MESSAGE (OUTPATIENT)
Dept: OBSTETRICS AND GYNECOLOGY | Facility: CLINIC | Age: 44
End: 2019-02-20

## 2019-03-14 ENCOUNTER — HOSPITAL ENCOUNTER (OUTPATIENT)
Dept: RADIOLOGY | Facility: HOSPITAL | Age: 44
Discharge: HOME | End: 2019-03-14
Attending: OBSTETRICS & GYNECOLOGY
Payer: MEDICAID

## 2019-03-14 DIAGNOSIS — R92.8 ABNORMALITY OF RIGHT BREAST ON SCREENING MAMMOGRAM: ICD-10-CM

## 2019-03-14 PROCEDURE — 77062 BREAST TOMOSYNTHESIS BI: CPT | Mod: 26,,, | Performed by: RADIOLOGY

## 2019-03-14 PROCEDURE — 77062 BREAST TOMOSYNTHESIS BI: CPT | Mod: TC

## 2019-03-14 PROCEDURE — 77066 MAMMO DIGITAL DIAGNOSTIC BILAT WITH TOMOSYNTHESIS_CAD: ICD-10-PCS | Mod: 26,,, | Performed by: RADIOLOGY

## 2019-03-14 PROCEDURE — 77066 DX MAMMO INCL CAD BI: CPT | Mod: 26,,, | Performed by: RADIOLOGY

## 2019-03-14 PROCEDURE — 77062 MAMMO DIGITAL DIAGNOSTIC BILAT WITH TOMOSYNTHESIS_CAD: ICD-10-PCS | Mod: 26,,, | Performed by: RADIOLOGY

## 2019-03-19 DIAGNOSIS — Z91.89 AT HIGH RISK FOR BREAST CANCER: ICD-10-CM

## 2019-05-14 ENCOUNTER — TELEPHONE (OUTPATIENT)
Dept: OBSTETRICS AND GYNECOLOGY | Facility: CLINIC | Age: 44
End: 2019-05-14

## 2019-05-14 NOTE — TELEPHONE ENCOUNTER
----- Message from Berenice Mathew sent at 5/13/2019  4:31 PM CDT -----  Contact: [phone]/self  Type:  Sooner Apoointment Request    Caller is requesting a sooner appointment.  Caller declined first available appointment listed below.  Caller will not accept being placed on the waitlist and is requesting a message be sent to doctor.  Name of Caller: pt  When is the first available appointment? 6/3/19  Symptoms: menopausal symptoms, vaginal itching  Would the patient rather a call back or a response via [facility]? Call back  Best Call Back Number: [phone]  Additional Information:

## 2019-05-15 ENCOUNTER — OFFICE VISIT (OUTPATIENT)
Dept: OBSTETRICS AND GYNECOLOGY | Facility: CLINIC | Age: 44
End: 2019-05-15
Payer: MEDICAID

## 2019-05-15 VITALS
BODY MASS INDEX: 40.58 KG/M2 | SYSTOLIC BLOOD PRESSURE: 128 MMHG | WEIGHT: 243.81 LBS | DIASTOLIC BLOOD PRESSURE: 76 MMHG

## 2019-05-15 DIAGNOSIS — N90.89 VULVAR IRRITATION: ICD-10-CM

## 2019-05-15 DIAGNOSIS — C81.90 HODGKIN LYMPHOMA, UNSPECIFIED HODGKIN LYMPHOMA TYPE, UNSPECIFIED BODY REGION: ICD-10-CM

## 2019-05-15 DIAGNOSIS — R23.2 HOT FLASHES: Primary | ICD-10-CM

## 2019-05-15 PROCEDURE — 99213 OFFICE O/P EST LOW 20 MIN: CPT | Mod: S$PBB,,, | Performed by: OBSTETRICS & GYNECOLOGY

## 2019-05-15 PROCEDURE — 99213 PR OFFICE/OUTPT VISIT, EST, LEVL III, 20-29 MIN: ICD-10-PCS | Mod: S$PBB,,, | Performed by: OBSTETRICS & GYNECOLOGY

## 2019-05-15 PROCEDURE — 99999 PR PBB SHADOW E&M-EST. PATIENT-LVL III: ICD-10-PCS | Mod: PBBFAC,,, | Performed by: OBSTETRICS & GYNECOLOGY

## 2019-05-15 PROCEDURE — 99213 OFFICE O/P EST LOW 20 MIN: CPT | Mod: PBBFAC,PO | Performed by: OBSTETRICS & GYNECOLOGY

## 2019-05-15 PROCEDURE — 99999 PR PBB SHADOW E&M-EST. PATIENT-LVL III: CPT | Mod: PBBFAC,,, | Performed by: OBSTETRICS & GYNECOLOGY

## 2019-05-15 RX ORDER — CLOBETASOL PROPIONATE 0.5 MG/G
OINTMENT TOPICAL
Qty: 30 G | Refills: 1 | Status: SHIPPED | OUTPATIENT
Start: 2019-05-15 | End: 2021-02-23

## 2019-05-15 RX ORDER — CLONAZEPAM 0.5 MG/1
0.5 TABLET ORAL
COMMUNITY
End: 2021-02-23

## 2019-05-15 RX ORDER — CITALOPRAM 20 MG/1
20 TABLET, FILM COATED ORAL
COMMUNITY
End: 2023-09-20

## 2019-05-15 NOTE — PROGRESS NOTES
GYNECOLOGY OFFICE NOTE    Reason for visit: menopausal symptoms    HPI: Pt is a 44 y.o.  female  who presents for evaluation of menopausal symptoms (amenorrhea/hot flashes). Just recently completed chemo for HL in Feb. Discussed options for management of hotflashes with hx of PE wouldn't recommend HRT. Discussed brisdelle. Also complains of external vaginal irritation and recurrent bump on buttock. No vaginal discharge.     Past Medical History:   Diagnosis Date    Diabetes mellitus     Hodgkin lymphoma     dx- Aug 15, 2018, chemo , due to start radiation    Hyperlipidemia     Hypertension     Pulmonary embolism 2018    Pulmonary embolism     dx 2018, on lovenox       Past Surgical History:   Procedure Laterality Date    BREAST BIOPSY Right     stereo rt 2018    CHOLECYSTECTOMY  1998    UMBILICAL HERNIA REPAIR  2001    mesh placed       Family History   Problem Relation Age of Onset    Breast cancer Mother     Breast cancer Maternal Cousin     Breast cancer Maternal Aunt     Colon cancer Neg Hx     Ovarian cancer Neg Hx        Social History     Tobacco Use    Smoking status: Never Smoker    Smokeless tobacco: Never Used   Substance Use Topics    Alcohol use: Yes     Comment: Socially    Drug use: No       OB History    Para Term  AB Living   2 2 0     2   SAB TAB Ectopic Multiple Live Births           2      # Outcome Date GA Lbr Aurelio/2nd Weight Sex Delivery Anes PTL Lv   2 Para     F Vag-Spont   VICKIE   1 Para     M Vag-Spont   VICKIE       Current Outpatient Medications   Medication Sig    ACCU-CHEK FASTCLIX Misc     amitriptyline (ELAVIL) 50 MG tablet Take 1 tablet by mouth.    butalbital-acetaminophen-caffeine -40 mg (FIORICET, ESGIC) -40 mg per tablet     cetirizine (ZYRTEC) 10 MG tablet     cholecalciferol, vitamin D3, (VITAMIN D3) 5,000 unit Tab Take 5,000 Units by mouth once daily.    cyclobenzaprine (FLEXERIL)  10 MG tablet Take 10 mg by mouth 3 (three) times daily as needed for Muscle spasms.    enoxaparin (LOVENOX) 120 mg/0.8 mL Syrg     ergocalciferol (ERGOCALCIFEROL) 50,000 unit Cap Take 1 capsule by mouth.    gabapentin (NEURONTIN) 400 MG capsule Take 400 mg by mouth 3 (three) times daily.    garlic 1,000 mg Cap Take by mouth.    lisinopril-hydrochlorothiazide (PRINZIDE,ZESTORETIC) 20-12.5 mg per tablet Take 1 tablet by mouth once daily.    metFORMIN (GLUCOPHAGE-XR) 750 MG 24 hr tablet Take 750 mg by mouth daily with breakfast.    niacin (SLO-NIACIN) 500 mg tablet Take 500 mg by mouth 2 (two) times daily with meals.    ONETOUCH ULTRA BLUE TEST STRIP Strp     ONETOUCH ULTRA2 kit     ranitidine (ZANTAC) 150 MG tablet Take 150 mg by mouth 2 (two) times daily.    topiramate (TOPAMAX) 100 MG tablet Take 100 mg by mouth 2 (two) times daily.    citalopram (CELEXA) 20 MG tablet Take 20 mg by mouth.    clobetasol 0.05% (TEMOVATE) 0.05 % Oint Apply amount of 1/2 of pencil eraser twice daily for two weeks, once daily for two weeks, then Monday and Thursday    clonazePAM (KLONOPIN) 0.5 MG tablet Take 0.5 mg by mouth.     No current facility-administered medications for this visit.        Allergies: Patient has no known allergies.     /76   Wt 110.6 kg (243 lb 13.3 oz)   LMP 09/15/2018 (Within Weeks)   BMI 40.58 kg/m²     ROS:  GENERAL: Denies fever or chills.   SKIN: Denies rash or lesions.   HEAD: Denies head injury or headache.   CHEST: Denies chest pain or shortness of breath.   CARDIOVASCULAR: Denies palpitations or chest pain.   ABDOMEN: No constipation, diarrhea, nausea, vomiting or rectal bleeding.   URINARY: No dysuria, hematuria, or burning on urination.  REPRODUCTIVE: See HPI.   BREASTS: see HPI  NEUROLOGIC: Denies syncope or weakness.     Physical Exam:  GENERAL: alert, appears stated age and cooperative  NEUROLOGIC: orientated to person, place and time, normal mood and affect   CHEST: Normal  respiratory effort  NECK: normal appearance  SKIN: no acne, hirsutism  BREAST EXAM: not examined  ABDOMEN: abdomen is soft without significant tenderness, masses  EXTERNAL GENITALIA:  thining of tissue around labia majora, ?lichen  Area on buttock healed- no signs of boil, fluctuance, drainage    Diagnosis:  1. Hot flashes    2. Vulvar irritation        Plan:   1. Discussed options, given information regarding brisdelle.  2. Trial of clobetasol    Orders Placed This Encounter    clobetasol 0.05% (TEMOVATE) 0.05 % Oint       Patient was counseled today on the new ACS guidelines for cervical cytology screening as well as the current recommendations for breast cancer screening. She was counseled to follow up with her PCP for other routine health maintenance.     Follow up if symptoms worsen or fail to improve.      Tiffanie Rhodes MD  OB/GYN  Pager: [phone]

## 2019-05-15 NOTE — PATIENT INSTRUCTIONS
Paroxetine capsules  What is this medicine?  PAROXETINE (pa ARISTIDES e teen) is used to treat hot flashes due to menopause.  How should I use this medicine?  Take this medicine by mouth once daily at bedtime. Follow the directions on the prescription label. This medicine can be taken with or without food. Take your medicine at regular intervals. Do not take your medicine more often than directed.  A special MedGuide will be given to you by the pharmacist with each prescription and refill. Be sure to read this information carefully each time.  What side effects may I notice from receiving this medicine?  Side effects that you should report to your doctor or health care professional as soon as possible:  · allergic reactions like skin rash, itching or hives, swelling of the face, lips, or tongue  · changes in emotions or moods  · confusion  · depression  · feeling faint or lightheaded, falls  · seizures  · suicidal thoughts or actions  · unusual bleeding or bruising  · unusually weak or tired  · weakness  Side effects that usually do not require medical attention (Report these to your doctor or health care professional if they continue or are bothersome.):  · change in sex drive or performance  · fatigue  · drowsiness  · headache  · insomnia  · nausea/vomiting  · upset stomach  What may interact with this medicine?  Do not take this medicine with any of the following medications:  · linezolid  · MAOIs like Carbex, Eldepryl, Marplan, Nardil, and Parnate  · methylene blue (injected into a vein)  · pimozide  · thioridazine  This medicine may also interact with the following medications:  · alcohol  · aspirin and aspirin-like medicines  · atomoxetine  · certain medicines for depression, anxiety, or psychotic disturbances  · certain medicines for irregular heart beat like propafenone, flecainide, encainide, and quinidine  · certain medicines for migraine headache like almotriptan, eletriptan, frovatriptan, naratriptan,  rizatriptan, sumatriptan, zolmitriptan  · cimetidine  · digoxin  · diuretics  · fentanyl  · fosamprenavir  · furazolidone  · isoniazid  · lithium  · medicines that treat or prevent blood clots like warfarin, enoxaparin, and dalteparin  · medicines for sleep  · NSAIDs, medicines for pain and inflammation, like ibuprofen or naproxen  · phenobarbital  · phenytoin  · procarbazine  · rasagiline  · ritonavir  · supplements like Bailey's wort, kava kava, valerian  · tamoxifen  · tramadol  · tryptophan  What if I miss a dose?  If you miss a dose, take it as soon as you can. If it is almost time for your next dose, take only that dose. Do not take double or extra doses.  Where should I keep my medicine?  Keep out of the reach of children.  Store at room temperature between 20 and 25 degrees C (68 and 77 degrees F). Throw away any unused medicine after the expiration date.  What should I tell my health care provider before I take this medicine?  They need to know if you have any of these conditions:  · bleeding disorders  · glaucoma  · heart disease  · kidney disease  · liver disease  · low levels of sodium in the blood  · anne-marie or bipolar disorder  · seizures  · suicidal thoughts, plans, or attempt; a previous suicide attempt by you or a family member  · take MAOIs like Carbex, Eldepryl, Marplan, Nardil, and Parnate  · take medicines that treat or prevent blood clots  · an unusual or allergic reaction to paroxetine, other medicines, foods, dyes, or preservatives  · pregnant or trying to get pregnant  · breast-feeding  What should I watch for while using this medicine?  Tell your doctor or healthcare professional if your symptoms do not start to get better or if they get worse. Visit your doctor or health care professional for regular checks on your progress.  Patients and their families should watch out for new or worsening thoughts of suicide or depression. Also watch out for sudden changes in feelings such as feeling  anxious, agitated, panicky, irritable, hostile, aggressive, impulsive, severely restless, overly excited and hyperactive, or not being able to sleep. If this happens, especially at the beginning of treatment or after a change in dose, call your health care professional.  You may get drowsy or dizzy. Do not drive, use machinery, or do anything that needs mental alertness until you know how this medicine affects you. Do not stand or sit up quickly, especially if you are an older patient. This reduces the risk of dizzy or fainting spells. Alcohol may interfere with the effect of this medicine. Avoid alcoholic drinks.  Your mouth may get dry. Chewing sugarless gum, sucking hard candy and drinking plenty of water will help. Contact your doctor if the problem does not go away or is severe.  Women should inform their doctor if they wish to become pregnant or think they might be pregnant. There is a potential for serious side effects to an unborn child. Talk to your health care professional or pharmacist for more information. Do not become pregnant while taking this medicine.  NOTE:This sheet is a summary. It may not cover all possible information. If you have questions about this medicine, talk to your doctor, pharmacist, or health care provider. Copyright© 2017 [employer]

## 2019-12-28 ENCOUNTER — PATIENT MESSAGE (OUTPATIENT)
Dept: OBSTETRICS AND GYNECOLOGY | Facility: CLINIC | Age: 44
End: 2019-12-28

## 2020-01-02 ENCOUNTER — PATIENT MESSAGE (OUTPATIENT)
Dept: OBSTETRICS AND GYNECOLOGY | Facility: CLINIC | Age: 45
End: 2020-01-02

## 2020-01-02 NOTE — TELEPHONE ENCOUNTER
Not sure why this was routed to me. This is a scheduling message. Please assist patient with scheduling her and her daughter on the same day    Tiffanie Rhodes MD, FACOG  OB/GYN  Pager: [phone]

## 2020-01-10 ENCOUNTER — OFFICE VISIT (OUTPATIENT)
Dept: OBSTETRICS AND GYNECOLOGY | Facility: CLINIC | Age: 45
End: 2020-01-10
Payer: MEDICAID

## 2020-01-10 VITALS
BODY MASS INDEX: 44.98 KG/M2 | SYSTOLIC BLOOD PRESSURE: 126 MMHG | WEIGHT: 270.31 LBS | DIASTOLIC BLOOD PRESSURE: 72 MMHG

## 2020-01-10 DIAGNOSIS — Z01.419 WELL WOMAN EXAM WITH ROUTINE GYNECOLOGICAL EXAM: Primary | ICD-10-CM

## 2020-01-10 DIAGNOSIS — Z12.39 SCREENING FOR BREAST CANCER: ICD-10-CM

## 2020-01-10 DIAGNOSIS — Z12.4 SCREENING FOR CERVICAL CANCER: ICD-10-CM

## 2020-01-10 DIAGNOSIS — Z87.42 HISTORY OF OVARIAN CYST: ICD-10-CM

## 2020-01-10 PROCEDURE — 88175 CYTOPATH C/V AUTO FLUID REDO: CPT

## 2020-01-10 PROCEDURE — 99999 PR PBB SHADOW E&M-EST. PATIENT-LVL III: ICD-10-PCS | Mod: PBBFAC,,, | Performed by: OBSTETRICS & GYNECOLOGY

## 2020-01-10 PROCEDURE — 99999 PR PBB SHADOW E&M-EST. PATIENT-LVL III: CPT | Mod: PBBFAC,,, | Performed by: OBSTETRICS & GYNECOLOGY

## 2020-01-10 PROCEDURE — 99396 PREV VISIT EST AGE 40-64: CPT | Mod: S$PBB,,, | Performed by: OBSTETRICS & GYNECOLOGY

## 2020-01-10 PROCEDURE — 99396 PR PREVENTIVE VISIT,EST,40-64: ICD-10-PCS | Mod: S$PBB,,, | Performed by: OBSTETRICS & GYNECOLOGY

## 2020-01-10 PROCEDURE — 99213 OFFICE O/P EST LOW 20 MIN: CPT | Mod: PBBFAC,PO | Performed by: OBSTETRICS & GYNECOLOGY

## 2020-01-10 NOTE — PROGRESS NOTES
GYNECOLOGY OFFICE NOTE    Reason for visit: annual    HPI: Pt is a 44 y.o.  female  who presents for annual. Cycle: menarche- none since chemo in 2018. She is not sexually active. She denies vaginal discharge.  Last pap: 3/2018, denies hx of abnormal. Last MMG 3/2019- negative. Pt with hx of stable unilateral ovarian cyst- last evaluated with U/S in . Denies any current issues but desires removal.     Past Medical History:   Diagnosis Date    Diabetes mellitus     Hodgkin lymphoma     dx- Aug 15, 2018, chemo , due to start radiation    Hyperlipidemia     Hypertension     Pulmonary embolism 2018    Pulmonary embolism     dx 2018, on lovenox       Past Surgical History:   Procedure Laterality Date    BREAST BIOPSY Right     stereo rt 2018    CHOLECYSTECTOMY  1998    UMBILICAL HERNIA REPAIR  2001    mesh placed       Family History   Problem Relation Age of Onset    Breast cancer Mother     Breast cancer Maternal Cousin     Breast cancer Maternal Aunt     Colon cancer Neg Hx     Ovarian cancer Neg Hx        Social History     Tobacco Use    Smoking status: Never Smoker    Smokeless tobacco: Never Used   Substance Use Topics    Alcohol use: Yes     Comment: Socially    Drug use: No       OB History    Para Term  AB Living   2 2 0     2   SAB TAB Ectopic Multiple Live Births           2      # Outcome Date GA Lbr Aurelio/2nd Weight Sex Delivery Anes PTL Lv   2 Para     F Vag-Spont   VICKIE   1 Para     M Vag-Spont   VICKIE       Current Outpatient Medications   Medication Sig    ACCU-CHEK FASTCLIX Misc     amitriptyline (ELAVIL) 50 MG tablet Take 1 tablet by mouth.    cholecalciferol, vitamin D3, (VITAMIN D3) 5,000 unit Tab Take 5,000 Units by mouth once daily.    citalopram (CELEXA) 20 MG tablet Take 20 mg by mouth.    clonazePAM (KLONOPIN) 0.5 MG tablet Take 0.5 mg by mouth.    ergocalciferol (ERGOCALCIFEROL) 50,000 unit Cap Take 1  capsule by mouth.    gabapentin (NEURONTIN) 400 MG capsule Take 600 mg by mouth 3 (three) times daily.     garlic 1,000 mg Cap Take by mouth.    lisinopril-hydrochlorothiazide (PRINZIDE,ZESTORETIC) 20-12.5 mg per tablet Take 1 tablet by mouth once daily.    metFORMIN (GLUCOPHAGE-XR) 750 MG 24 hr tablet Take 750 mg by mouth daily with breakfast.    niacin (SLO-NIACIN) 500 mg tablet Take 500 mg by mouth 2 (two) times daily with meals.    ONETOUCH ULTRA BLUE TEST STRIP Strp     ONETOUCH ULTRA2 kit     ranitidine (ZANTAC) 150 MG tablet Take 150 mg by mouth 2 (two) times daily.    topiramate (TOPAMAX) 100 MG tablet Take 100 mg by mouth 2 (two) times daily.    butalbital-acetaminophen-caffeine -40 mg (FIORICET, ESGIC) -40 mg per tablet     cetirizine (ZYRTEC) 10 MG tablet     clobetasol 0.05% (TEMOVATE) 0.05 % Oint Apply amount of 1/2 of pencil eraser twice daily for two weeks, once daily for two weeks, then Monday and Thursday (Patient not taking: Reported on 1/10/2020)    cyclobenzaprine (FLEXERIL) 10 MG tablet Take 10 mg by mouth 3 (three) times daily as needed for Muscle spasms.    enoxaparin (LOVENOX) 120 mg/0.8 mL Syrg      No current facility-administered medications for this visit.        Allergies: Atorvastatin and Naproxen     /72   Wt 122.6 kg (270 lb 4.5 oz)   LMP 09/01/2018   BMI 44.98 kg/m²     ROS:  GENERAL: Denies fever or chills.   SKIN: Denies rash or lesions.   HEAD: Denies head injury or headache.   CHEST: Denies chest pain or shortness of breath.   CARDIOVASCULAR: Denies palpitations or chest pain.   ABDOMEN: No constipation, diarrhea, nausea, vomiting or rectal bleeding.   URINARY: No dysuria, hematuria, or burning on urination.  REPRODUCTIVE: See HPI.   BREASTS: see HPI  NEUROLOGIC: Denies syncope or weakness.     Physical Exam:  GENERAL: alert, appears stated age and cooperative  NEUROLOGIC: orientated to person, place and time, normal mood and affect   CHEST:  Normal respiratory effort  NECK: normal appearance  SKIN: no acne, hirsutism  BREAST EXAM: breasts appear normal, no suspicious masses, no skin or nipple changes or axillary nodes  ABDOMEN: abdomen is soft without significant tenderness, masses  EXTERNAL GENITALIA:  normal general appearance  URETHRA: normal urethra, normal urethral meatus  VAGINA:  normal without tenderness, induration or masses  CERVIX:  Normal  UTERUS:  exam limited by habitus  ADNEXA:  nontender     Diagnosis:  1. Well woman exam with routine gynecological exam    2. History of ovarian cyst    3. Screening for cervical cancer    4. Screening for breast cancer        Plan:   1. annual  2. F/u repeat U/S- hx of ovarian cyst. Stopped work-up secondary to dx of lymphoma. Discussed briefly surgical options, cystectomy vs USO/BSO, and risk and benefits of HRT- hx of PE   3. Pap/hpv today  4. MMG ordered    Orders Placed This Encounter    US Pelvis Comp with Transvag NON-OB (xpd    Mammo Digital Screening Bilat w/ Rakesh    Liquid-Based Pap Smear, Screening    US OB/GYN Procedure ([facility])       Patient was counseled today on the new ACS guidelines for cervical cytology screening as well as the current recommendations for breast cancer screening. She was counseled to follow up with her PCP for other routine health maintenance.     Follow up for ultrasound.      Tiffanie Rhodes MD  OB/GYN  Pager: [phone]

## 2020-01-15 ENCOUNTER — ANESTHESIA EVENT (OUTPATIENT)
Dept: SURGERY | Facility: HOSPITAL | Age: 45
End: 2020-01-15
Payer: MEDICAID

## 2020-01-16 ENCOUNTER — HOSPITAL ENCOUNTER (OUTPATIENT)
Facility: HOSPITAL | Age: 45
Discharge: HOME | End: 2020-01-16
Attending: ORTHOPAEDIC SURGERY | Admitting: ORTHOPAEDIC SURGERY
Payer: MEDICAID

## 2020-01-16 ENCOUNTER — ANESTHESIA (OUTPATIENT)
Dept: SURGERY | Facility: HOSPITAL | Age: 45
End: 2020-01-16
Payer: MEDICAID

## 2020-01-16 VITALS
HEART RATE: 86 BPM | TEMPERATURE: 98 F | SYSTOLIC BLOOD PRESSURE: 129 MMHG | HEIGHT: 65 IN | WEIGHT: 263 LBS | BODY MASS INDEX: 43.82 KG/M2 | DIASTOLIC BLOOD PRESSURE: 64 MMHG | OXYGEN SATURATION: 98 % | RESPIRATION RATE: 20 BRPM

## 2020-01-16 DIAGNOSIS — Z01.818 PRE-OP TESTING: ICD-10-CM

## 2020-01-16 LAB
BASOPHILS # BLD AUTO: 0.03 K/UL (ref 0–0.2)
BASOPHILS NFR BLD: 0.6 % (ref 0–1.9)
DIFFERENTIAL METHOD: ABNORMAL
EOSINOPHIL # BLD AUTO: 0.1 K/UL (ref 0–0.5)
EOSINOPHIL NFR BLD: 1.7 % (ref 0–8)
ERYTHROCYTE [DISTWIDTH] IN BLOOD BY AUTOMATED COUNT: 13.6 % (ref 11.5–14.5)
HCT VFR BLD AUTO: 35.4 % (ref 37–48.5)
HGB BLD-MCNC: 11.3 G/DL (ref 12–16)
LYMPHOCYTES # BLD AUTO: 1.2 K/UL (ref 1–4.8)
LYMPHOCYTES NFR BLD: 24.3 % (ref 18–48)
MCH RBC QN AUTO: 28.8 PG (ref 27–31)
MCHC RBC AUTO-ENTMCNC: 31.9 G/DL (ref 32–36)
MCV RBC AUTO: 90 FL (ref 82–98)
MONOCYTES # BLD AUTO: 0.4 K/UL (ref 0.3–1)
MONOCYTES NFR BLD: 7.9 % (ref 4–15)
NEUTROPHILS # BLD AUTO: 3.2 K/UL (ref 1.8–7.7)
NEUTROPHILS NFR BLD: 65.5 % (ref 38–73)
PLATELET # BLD AUTO: 298 K/UL (ref 150–350)
PMV BLD AUTO: 9.5 FL (ref 9.2–12.9)
RBC # BLD AUTO: 3.93 M/UL (ref 4–5.4)
WBC # BLD AUTO: 4.82 K/UL (ref 3.9–12.7)

## 2020-01-16 PROCEDURE — 25000003 PHARM REV CODE 250: Performed by: NURSE ANESTHETIST, CERTIFIED REGISTERED

## 2020-01-16 PROCEDURE — 76942 ECHO GUIDE FOR BIOPSY: CPT | Performed by: STUDENT IN AN ORGANIZED HEALTH CARE EDUCATION/TRAINING PROGRAM

## 2020-01-16 PROCEDURE — 27201423 OPTIME MED/SURG SUP & DEVICES STERILE SUPPLY: Performed by: ORTHOPAEDIC SURGERY

## 2020-01-16 PROCEDURE — 64415 NJX AA&/STRD BRCH PLXS IMG: CPT | Mod: 59,LT | Performed by: STUDENT IN AN ORGANIZED HEALTH CARE EDUCATION/TRAINING PROGRAM

## 2020-01-16 PROCEDURE — 25000003 PHARM REV CODE 250: Performed by: ORTHOPAEDIC SURGERY

## 2020-01-16 PROCEDURE — 36000711: Performed by: ORTHOPAEDIC SURGERY

## 2020-01-16 PROCEDURE — 63600175 PHARM REV CODE 636 W HCPCS: Performed by: ORTHOPAEDIC SURGERY

## 2020-01-16 PROCEDURE — 63600175 PHARM REV CODE 636 W HCPCS: Performed by: NURSE ANESTHETIST, CERTIFIED REGISTERED

## 2020-01-16 PROCEDURE — 01630 ANES OPN/ARTHR PX SHO JT NOS: CPT | Performed by: ORTHOPAEDIC SURGERY

## 2020-01-16 PROCEDURE — 63600175 PHARM REV CODE 636 W HCPCS: Performed by: STUDENT IN AN ORGANIZED HEALTH CARE EDUCATION/TRAINING PROGRAM

## 2020-01-16 PROCEDURE — 71000033 HC RECOVERY, INTIAL HOUR: Performed by: ORTHOPAEDIC SURGERY

## 2020-01-16 PROCEDURE — 36415 COLL VENOUS BLD VENIPUNCTURE: CPT

## 2020-01-16 PROCEDURE — 93010 EKG 12-LEAD: ICD-10-PCS | Mod: ,,, | Performed by: INTERNAL MEDICINE

## 2020-01-16 PROCEDURE — 71000015 HC POSTOP RECOV 1ST HR: Performed by: ORTHOPAEDIC SURGERY

## 2020-01-16 PROCEDURE — 93010 ELECTROCARDIOGRAM REPORT: CPT | Mod: ,,, | Performed by: INTERNAL MEDICINE

## 2020-01-16 PROCEDURE — 25000003 PHARM REV CODE 250: Performed by: STUDENT IN AN ORGANIZED HEALTH CARE EDUCATION/TRAINING PROGRAM

## 2020-01-16 PROCEDURE — 37000009 HC ANESTHESIA EA ADD 15 MINS: Performed by: ORTHOPAEDIC SURGERY

## 2020-01-16 PROCEDURE — 36000710: Performed by: ORTHOPAEDIC SURGERY

## 2020-01-16 PROCEDURE — 37000008 HC ANESTHESIA 1ST 15 MINUTES: Performed by: ORTHOPAEDIC SURGERY

## 2020-01-16 PROCEDURE — 85025 COMPLETE CBC W/AUTO DIFF WBC: CPT

## 2020-01-16 PROCEDURE — 71000016 HC POSTOP RECOV ADDL HR: Performed by: ORTHOPAEDIC SURGERY

## 2020-01-16 PROCEDURE — 93005 ELECTROCARDIOGRAM TRACING: CPT | Mod: 59

## 2020-01-16 PROCEDURE — C9290 INJ, BUPIVACAINE LIPOSOME: HCPCS | Performed by: STUDENT IN AN ORGANIZED HEALTH CARE EDUCATION/TRAINING PROGRAM

## 2020-01-16 RX ORDER — HYDROMORPHONE HYDROCHLORIDE 2 MG/ML
0.5 INJECTION, SOLUTION INTRAMUSCULAR; INTRAVENOUS; SUBCUTANEOUS EVERY 5 MIN PRN
Status: DISCONTINUED | OUTPATIENT
Start: 2020-01-16 | End: 2020-01-16 | Stop reason: HOSPADM

## 2020-01-16 RX ORDER — HEPARIN 100 UNIT/ML
5 SYRINGE INTRAVENOUS ONCE
Status: COMPLETED | OUTPATIENT
Start: 2020-01-16 | End: 2020-01-16

## 2020-01-16 RX ORDER — CEFAZOLIN SODIUM 2 G/50ML
2 SOLUTION INTRAVENOUS
Status: ACTIVE | OUTPATIENT
Start: 2020-01-16

## 2020-01-16 RX ORDER — SODIUM CHLORIDE, SODIUM LACTATE, POTASSIUM CHLORIDE, CALCIUM CHLORIDE 600; 310; 30; 20 MG/100ML; MG/100ML; MG/100ML; MG/100ML
INJECTION, SOLUTION INTRAVENOUS CONTINUOUS PRN
Status: DISCONTINUED | OUTPATIENT
Start: 2020-01-16 | End: 2020-01-16

## 2020-01-16 RX ORDER — DIPHENHYDRAMINE HYDROCHLORIDE 50 MG/ML
25 INJECTION INTRAMUSCULAR; INTRAVENOUS EVERY 6 HOURS PRN
Status: DISCONTINUED | OUTPATIENT
Start: 2020-01-16 | End: 2020-01-16 | Stop reason: HOSPADM

## 2020-01-16 RX ORDER — GLYCOPYRROLATE 0.2 MG/ML
INJECTION INTRAMUSCULAR; INTRAVENOUS
Status: DISCONTINUED | OUTPATIENT
Start: 2020-01-16 | End: 2020-01-16

## 2020-01-16 RX ORDER — MIDAZOLAM HYDROCHLORIDE 1 MG/ML
INJECTION, SOLUTION INTRAMUSCULAR; INTRAVENOUS
Status: DISCONTINUED | OUTPATIENT
Start: 2020-01-16 | End: 2020-01-16

## 2020-01-16 RX ORDER — ONDANSETRON 2 MG/ML
INJECTION INTRAMUSCULAR; INTRAVENOUS
Status: DISCONTINUED | OUTPATIENT
Start: 2020-01-16 | End: 2020-01-16

## 2020-01-16 RX ORDER — EPINEPHRINE 1 MG/ML
INJECTION, SOLUTION INTRACARDIAC; INTRAMUSCULAR; INTRAVENOUS; SUBCUTANEOUS
Status: DISCONTINUED | OUTPATIENT
Start: 2020-01-16 | End: 2020-01-16 | Stop reason: HOSPADM

## 2020-01-16 RX ORDER — ACETAMINOPHEN 500 MG
1000 TABLET ORAL
Status: COMPLETED | OUTPATIENT
Start: 2020-01-16 | End: 2020-01-16

## 2020-01-16 RX ORDER — SUCCINYLCHOLINE CHLORIDE 20 MG/ML
INJECTION INTRAMUSCULAR; INTRAVENOUS
Status: DISCONTINUED | OUTPATIENT
Start: 2020-01-16 | End: 2020-01-16

## 2020-01-16 RX ORDER — ROCURONIUM BROMIDE 10 MG/ML
INJECTION, SOLUTION INTRAVENOUS
Status: DISCONTINUED | OUTPATIENT
Start: 2020-01-16 | End: 2020-01-16

## 2020-01-16 RX ORDER — NEOSTIGMINE METHYLSULFATE 1 MG/ML
INJECTION, SOLUTION INTRAVENOUS
Status: DISCONTINUED | OUTPATIENT
Start: 2020-01-16 | End: 2020-01-16

## 2020-01-16 RX ORDER — LIDOCAINE HCL/PF 100 MG/5ML
SYRINGE (ML) INTRAVENOUS
Status: DISCONTINUED | OUTPATIENT
Start: 2020-01-16 | End: 2020-01-16

## 2020-01-16 RX ORDER — BUPIVACAINE HYDROCHLORIDE 2.5 MG/ML
INJECTION, SOLUTION EPIDURAL; INFILTRATION; INTRACAUDAL
Status: DISCONTINUED | OUTPATIENT
Start: 2020-01-16 | End: 2020-01-16

## 2020-01-16 RX ORDER — PROPOFOL 10 MG/ML
VIAL (ML) INTRAVENOUS
Status: DISCONTINUED | OUTPATIENT
Start: 2020-01-16 | End: 2020-01-16

## 2020-01-16 RX ORDER — OXYCODONE AND ACETAMINOPHEN 5; 325 MG/1; MG/1
1 TABLET ORAL EVERY 4 HOURS PRN
Qty: 20 TABLET | Refills: 0 | Status: ON HOLD | OUTPATIENT
Start: 2020-01-16 | End: 2023-09-28 | Stop reason: HOSPADM

## 2020-01-16 RX ORDER — HYDROMORPHONE HYDROCHLORIDE 2 MG/ML
0.2 INJECTION, SOLUTION INTRAMUSCULAR; INTRAVENOUS; SUBCUTANEOUS EVERY 5 MIN PRN
Status: DISCONTINUED | OUTPATIENT
Start: 2020-01-16 | End: 2020-01-16 | Stop reason: HOSPADM

## 2020-01-16 RX ORDER — PHENYLEPHRINE HYDROCHLORIDE 10 MG/ML
INJECTION INTRAVENOUS
Status: DISCONTINUED | OUTPATIENT
Start: 2020-01-16 | End: 2020-01-16

## 2020-01-16 RX ORDER — OXYCODONE AND ACETAMINOPHEN 5; 325 MG/1; MG/1
1 TABLET ORAL
Status: DISCONTINUED | OUTPATIENT
Start: 2020-01-16 | End: 2020-01-16 | Stop reason: HOSPADM

## 2020-01-16 RX ADMIN — SODIUM CHLORIDE, SODIUM LACTATE, POTASSIUM CHLORIDE, AND CALCIUM CHLORIDE: .6; .31; .03; .02 INJECTION, SOLUTION INTRAVENOUS at 03:01

## 2020-01-16 RX ADMIN — CEFAZOLIN SODIUM 2 G: 2 SOLUTION INTRAVENOUS at 04:01

## 2020-01-16 RX ADMIN — PHENYLEPHRINE HYDROCHLORIDE 100 MCG: 10 INJECTION INTRAVENOUS at 04:01

## 2020-01-16 RX ADMIN — GLYCOPYRROLATE 0.4 MG: 0.2 INJECTION, SOLUTION INTRAMUSCULAR; INTRAVENOUS at 05:01

## 2020-01-16 RX ADMIN — ACETAMINOPHEN 1000 MG: 500 TABLET ORAL at 10:01

## 2020-01-16 RX ADMIN — BUPIVACAINE 10 ML: 13.3 INJECTION, SUSPENSION, LIPOSOMAL INFILTRATION at 01:01

## 2020-01-16 RX ADMIN — PROPOFOL 150 MG: 10 INJECTION, EMULSION INTRAVENOUS at 03:01

## 2020-01-16 RX ADMIN — MIDAZOLAM 2 MG: 1 INJECTION INTRAMUSCULAR; INTRAVENOUS at 01:01

## 2020-01-16 RX ADMIN — BUPIVACAINE HYDROCHLORIDE 100 ML: 2.5 INJECTION, SOLUTION EPIDURAL; INFILTRATION; INTRACAUDAL; PERINEURAL at 03:01

## 2020-01-16 RX ADMIN — SUCCINYLCHOLINE CHLORIDE 120 MG: 20 INJECTION, SOLUTION INTRAMUSCULAR; INTRAVENOUS at 03:01

## 2020-01-16 RX ADMIN — HEPARIN 500 UNITS: 100 SYRINGE at 07:01

## 2020-01-16 RX ADMIN — ONDANSETRON 8 MG: 2 INJECTION, SOLUTION INTRAMUSCULAR; INTRAVENOUS at 05:01

## 2020-01-16 RX ADMIN — ROCURONIUM BROMIDE 30 MG: 10 INJECTION, SOLUTION INTRAVENOUS at 04:01

## 2020-01-16 RX ADMIN — BUPIVACAINE HYDROCHLORIDE 10 ML: 2.5 INJECTION, SOLUTION EPIDURAL; INFILTRATION; INTRACAUDAL; PERINEURAL at 01:01

## 2020-01-16 RX ADMIN — LIDOCAINE HYDROCHLORIDE 100 MG: 20 INJECTION, SOLUTION INTRAVENOUS at 03:01

## 2020-01-16 RX ADMIN — NEOSTIGMINE METHYLSULFATE 4 MG: 1 INJECTION INTRAVENOUS at 05:01

## 2020-01-16 NOTE — ANESTHESIA PREPROCEDURE EVALUATION
01/16/2020  Rosaura Mitchell is a 44 y.o., female with HTN, HLD, DMII, morbid obesity, hodgkin lymphoma, here for left shoulder arthroscopy under gen    Past Medical History:   Diagnosis Date    Diabetes mellitus     Hodgkin lymphoma     dx- Aug 15, 2018, chemo 9/25/18-2/26/2019, due to start radiation    Hyperlipidemia     Hypertension     Pulmonary embolism 06/26/2018    Pulmonary embolism     dx June 26, 2018, on lovenox     Past Surgical History:   Procedure Laterality Date    BREAST BIOPSY Right     stereo rt 4/2018    CHOLECYSTECTOMY  12/1998    UMBILICAL HERNIA REPAIR  05/2001    mesh placed     Lab Results   Component Value Date    WBC 4.82 01/16/2020    HGB 11.3 (L) 01/16/2020    HCT 35.4 (L) 01/16/2020     01/16/2020    TSH 0.921 04/23/2009         Anesthesia Evaluation    I have reviewed the Patient Summary Reports.    I have reviewed the Nursing Notes.   I have reviewed the Medications.     Review of Systems  Anesthesia Hx:  No problems with previous Anesthesia Denies Hx of Anesthetic complications  Denies Family Hx of Anesthesia complications.   Denies Personal Hx of Anesthesia complications.   Social:  Non-Smoker, Alcohol Use    Hematology/Oncology:     Oncology Normal     EENT/Dental:EENT/Dental Normal   Cardiovascular:   Exercise tolerance: good Hypertension    Pulmonary:  Pulmonary Normal    Renal/:  Renal/ Normal     Hepatic/GI:  Hepatic/GI Normal    Neurological:  Neurology Normal    Endocrine:   Diabetes    Psych:  Psychiatric Normal           Physical Exam  General:  Well nourished    Airway/Jaw/Neck:  Airway Findings: Mouth Opening: Small, but > 3cm Tongue: Large  Mallampati: III  Jaw/Neck Findings:  Neck ROM: Normal ROM      Dental:  Dental Findings: In tact   Chest/Lungs:  Chest/Lungs Findings: Clear to auscultation     Heart/Vascular:  Heart Findings: Rate: Normal   Rhythm: Regular Rhythm  Sounds: Normal        Mental Status:  Mental Status Findings:  Alert and Oriented, Cooperative         Anesthesia Plan  Type of Anesthesia, risks & benefits discussed:  Anesthesia Type:  general, regional  Patient's Preference:   Intra-op Monitoring Plan: standard ASA monitors  Intra-op Monitoring Plan Comments:   Post Op Pain Control Plan: per primary service following discharge from PACU  Post Op Pain Control Plan Comments:   Induction:   IV  Beta Blocker:         Informed Consent: Patient understands risks and agrees with Anesthesia plan.  Questions answered. Anesthesia consent signed with patient.  ASA Score: 3     Day of Surgery Review of History & Physical:            Ready For Surgery From Anesthesia Perspective.

## 2020-01-16 NOTE — PLAN OF CARE
Pt states name and . Verified to armband. Pt verifies procedure to be done. Verified to consent x2 and EPIC chart. Placed on cardiac monitor x3, pulse ox. Time out done.     1317 nerve block started.    1320 nerve block complete.

## 2020-01-16 NOTE — TRANSFER OF CARE
"Anesthesia Transfer of Care Note    Patient: Rosaura Mitchell    Procedure(s) Performed: Procedure(s) (LRB):  DEBRIDEMENT, SHOULDER, ARTHROSCOPIC EXTENSIVE ARTHROSCOPIC RELEASE CAPSULAR RELEASE (Left)    Anesthesia Type: general    Transport from OR: Transported from OR on 6-10 L/min O2 by face mask with adequate spontaneous ventilation    Post pain: adequate analgesia    Post assessment: no apparent anesthetic complications    Post vital signs: stable    Level of consciousness: awake    Nausea/Vomiting: no nausea/vomiting    Complications: none    Transfer of care protocol was followed      Last vitals:   Visit Vitals  BP (!) 140/68   Pulse 88   Temp 36.8 °C (98.2 °F) (Skin)   Resp 18   Ht 5' 5" (1.651 m)   Wt 119.3 kg (263 lb)   LMP 09/16/2018   SpO2 100%   Breastfeeding? No   BMI 43.77 kg/m²     "

## 2020-01-16 NOTE — H&P
Reason For Visit    Follow-up evaluation for left shoulder glenohumeral osteoarthritis     History of Present Illness    The patient returns today for evaluation of her left shoulder. She states that she has been miserable. She states that she recently watched a movie where someone had their arm amputated. She states that she is wondering if she should have her arm amputated. Pain level is 9 out of 10. She is been in physical therapy but states that has not helped. She is had 2 prior injections which she states has not helped.     Allergies   · Lipitor   · Naproxen TABS    Current Meds    Medication Name Instruction   Amitriptyline HCl - 50 MG Oral Tablet TAKE 1 TABLET BEDTIME   Gabapentin 400 MG Oral Capsule TAKE 1 CAPSULE 3 TIMES DAILY.   Garlic 1000 MG Oral Capsule    Lisinopril-hydroCHLOROthiazide 20-12.5 MG Oral Tablet    metFORMIN HCl  MG Oral Tablet Extended Release 24 Hour TAKE 2 TABLET TWICE DAILY   raNITIdine HCl - 150 MG Oral Tablet    Topiramate 100 MG Oral Tablet TAKE 2 TABLET TWICE DAILY   Vitamin D3 5000 UNIT Oral Tablet    Vitamin E 400 UNIT Oral Capsule TAKE 1 CAPSULE DAILY   Xarelto 20 MG Oral Tablet TAKE 1 TABLET DAILY     Active Problems   · Acute pain of both shoulders (M25.511,M25.512)   · Chronic left shoulder pain (M25.512,G89.29)   · Diabetes mellitus (E11.9)   · Essential hypertension (I10)   · Glenohumeral arthritis, left (M19.012)   · Hodgkin's lymphoma, adult (C81.90)   · Hyperlipidemia (E78.5)   · Internal impingement of left shoulder (M75.42)   · Low back pain (M54.5)   · Lumbar disc disease (M51.9)   · Weakness of left upper extremity (R29.898)    Surgical History   · History of Gallbladder surgery    Social History   · Never a smoker   · Occasional alcohol use    Review of Systems    see HPI  no dizziness     Vitals   Recorded: [id number] 10:00AM   Height 5 ft 5 in   Weight 249 lb    BMI Calculated 41.44   BSA Calculated 2.17   Systolic 102   Diastolic 70   Heart Rate 90   Pain  Scale 9     Physical Exam    Left shoulder exam: Upon inspection of the left shoulder, no visible deformity. Range of motion testing today reveals the following: Active elevation 120, passive elevation 160, internal rotation PSIS, external rotation is about 10 degrees on the left versus 30 on the right. Rotator cuff strength is 5 out of 5 in forward elevation, external rotation, and internal rotation.     Assessment   1. Glenohumeral arthritis, left (M19.012)    Plan    I have explained to the patient that this is a tough situation. She is young and she has glenohumeral osteoarthritis. I told her I was not favor of performing any sort of arthroplasty at her young age. I did mention continuation of nonoperative measures versus doing an arthroscopic procedure. The comprehensive arthroscopic management of the shoulder was discussed in detail and she was given a paper to read about this at home. She is in call us back if she wishes to proceed with the surgery.    There are no interval changes to report in the history and physical exam.The patient wishes to proceed with left shoulder arthroscopy with comprehensive arthroscopic management (biceps tenotomy, chondroplasty, osteophyte excision, labral debridement, any other indicated procedures).

## 2020-01-16 NOTE — ANESTHESIA PROCEDURE NOTES
Peripheral Block    Patient location during procedure: pre-op   Block not for primary anesthetic.  Reason for block: at surgeon's request and post-op pain management   Post-op Pain Location: left arm   Start time: 1/16/2020 1:17 PM  Timeout: 1/16/2020 1:15 PM   End time: 1/16/2020 1:20 PM    Staffing  Authorizing Provider: Blane Zhou MD  Performing Provider: Ashley Mixon MD    Preanesthetic Checklist  Completed: patient identified, site marked, surgical consent, pre-op evaluation, timeout performed, IV checked, risks and benefits discussed and monitors and equipment checked  Peripheral Block  Patient position: sitting  Prep: ChloraPrep  Patient monitoring: heart rate, cardiac monitor, continuous pulse ox, continuous capnometry and frequent blood pressure checks  Block type: interscalene  Laterality: left  Injection technique: single shot  Needle  Needle type: Stimuplex   Needle gauge: 21 G  Needle length: 4 in  Needle localization: anatomical landmarks and ultrasound guidance   -ultrasound image captured on disc.  Assessment  Injection assessment: negative aspiration, negative parasthesia and local visualized surrounding nerve  Paresthesia pain: none  Heart rate change: no  Slow fractionated injection: yes  Additional Notes  VSS.  [facility] RN monitoring vitals throughout procedure.  Patient tolerated procedure well.     10 cc of 0.25% bupivacaine mixed with 10 cc exparel injected without complication.

## 2020-01-17 NOTE — BRIEF OP NOTE
[facility]  Brief Operative Note    Surgery Date: 1/16/2020     Surgeon(s) and Role:     * Ariel Valladares MD - Primary    Assisting Surgeon: None    Pre-op Diagnosis:  Arthritis of left shoulder region [M19.012]    Post-op Diagnosis:  Post-Op Diagnosis Codes:     * Arthritis of left shoulder region [M19.012]    Procedure(s) (LRB):  DEBRIDEMENT, SHOULDER, ARTHROSCOPIC EXTENSIVE ARTHROSCOPIC RELEASE CAPSULAR RELEASE (Left)    Anesthesia: N/A    Description of the findings of the procedure(s): see op note    Estimated Blood Loss: * No values recorded between 1/16/2020  4:26 PM and 1/16/2020  5:38 PM *         Specimens:   Specimen (12h ago, onward)    None            Discharge Note    OUTCOME: Patient tolerated treatment/procedure well without complication and is now ready for discharge.    DISPOSITION: Home or Self Care    FINAL DIAGNOSIS:  <principal problem not specified>    FOLLOWUP: In clinic

## 2020-01-17 NOTE — ANESTHESIA POSTPROCEDURE EVALUATION
Anesthesia Post Evaluation    Patient: Rosaura Mitchell    Procedure(s) Performed: Procedure(s) (LRB):  DEBRIDEMENT, SHOULDER, ARTHROSCOPIC EXTENSIVE ARTHROSCOPIC RELEASE CAPSULAR RELEASE (Left)    Final Anesthesia Type: general    Patient location during evaluation: PACU  Patient participation: Yes- Able to Participate  Level of consciousness: awake and alert  Post-procedure vital signs: reviewed and stable  Pain management: adequate  Airway patency: patent    PONV status at discharge: No PONV  Anesthetic complications: no      Cardiovascular status: blood pressure returned to baseline  Respiratory status: unassisted  Hydration status: euvolemic  Follow-up not needed.          Vitals Value Taken Time   /64 1/16/2020  7:40 PM   Temp 36.8 °C (98.2 °F) 1/16/2020  7:40 PM   Pulse 86 1/16/2020  7:40 PM   Resp 20 1/16/2020  7:40 PM   SpO2 98 % 1/16/2020  7:40 PM         Event Time     Out of Recovery 18:30:00          Pain/Thomas Score: Pain Rating Prior to Med Admin: 6 (1/16/2020 10:49 AM)  Thomas Score: 10 (1/16/2020  7:40 PM)

## 2020-01-17 NOTE — OP NOTE
1/16/2020    Facility:  [facility]    Surgeon:  Ariel Valladares MD    First Asst:   Wilver Marcano MD    Second Asst:  Edin Kwon MD    Pre-operative Diagnosis:  Left glenohumeral osteoarthritis    Indication:  Improve ROM and pain    Post-operative Diagnosis:  Left glenohumeral osteoarthritis    Procedure:  1. Left extensive arthroscopic debridement  2. Left arthroscopic capsular release    The patient was identified in the pre-op holding area. Written informed consent obtained and arm marked. Pre-op interscalene block performed. Patient transferred to operating room. GETA achieved. Patient positioned into lateral decubitus position. Left shoulder prepped/draped in usual fashion. Timeout performed. Posterior portal established. Anterior portal established. There was the following findings: Grade IV chondromalacia posteroinferior glenoid, circumferential labral fraying, intact bicep, mild fraying articular side of supraspinatus, Grade II chondromalacia majority of humeral head.     Shaver introduced through first the anterior portal and then the posterior portal. Chondroplasty, labral debridement, and rotator cuff debridement performed. Next, radiofrequency ablator used  To release the anterior and posterior capsule. Instruments removed. Portal incisions closed with nylon suture. Sterile dressing applied. Patient transferred to PACU stable.     EBL: < 50cc    Drains: None    Complications: None known

## 2020-01-17 NOTE — PLAN OF CARE
Left upper chest port flushed with 10 ml NS & 5 ml heparin flush 100 unit/ml as ordered; hubber needle dc'ed without complication.

## 2020-01-17 NOTE — DISCHARGE INSTRUCTIONS
Discharge Instructions for Shoulder Arthroscopy  You had a shoulder arthroscopy. It is a surgical procedure that helps the healthcare provider diagnose and treat shoulder problems. These include instability, arthritis, and rotator cuff problems. Below are instructions to help you care for your shoulder when you are at home.  What to expect  After surgery, your joint may be swollen, painful, and stiff. The joint will heal with time. But, recovery times vary depending on what was done. For example, with a shaved rotator cuff, you may be told to move your arm soon after surgery to prevent stiffness. But if the rotator cuff is repaired or treatment is for instability or arthritis, your healthcare provider may want you to limit movement of your arm for a period of time. Follow your healthcare providers instructions regarding arm movement.          · Dont drive until your healthcare provider says its OK. And never drive while taking opioid pain medicine.  · Bend your wrist and wiggle your fingers often to help blood flow.  Incision care  · Check your incision daily for redness, tenderness, or drainage.  · Wait 3 day(s) after your surgery to begin showering. Then shower as needed. Carefully wash your incision with soap and water. Gently pat it dry. Dont rub the incision, or apply creams or lotions to it.  · Dont soak in a bathtub, hot tub, or pool until your healthcare provider says its OK.  Other home care  · Take your temperature daily for 7 days after your surgery. Report a fever above 100.4º F (38º C) to your healthcare provider. Fever may be a sign of infection.  · Wear your sling or immobilizer as directed by your healthcare provider.  · Use pain medicine, as needed and as directed. Don't wait until the pain is severe to start using the medicine.   · Use an ice pack or a bag of frozen peas--or something similar--wrapped in a thin towel on your shoulder to reduce swelling for the first 48 hours after surgery.  Hold the ice pack. Leave the ice pack on for 20 minutes; then take it off for 20 minutes. Repeat as needed.  Follow-up  Make a follow-up appointment as directed by your healthcare provider.  When to seek medical attention  Call 911 right away if you have any of the following:  · Chest pain  · Shortness of breath  Otherwise, call your healthcare provider immediately if you have any of the following:  · Increasing shoulder pain or pain not relieved by medicine  · Pain or swelling in the arm on the side of your surgery  · Numbness, tingling, or blue-gray color of your arm or fingers on the side of your surgery  · Drainage or oozing, redness, or warmth at the incision  · Fever above 100.4º F (38º C) or shaking chills  · Nausea or vomiting   Date Last Reviewed: 11/16/2015 © 2000-2017 [employer]. [address] [place] [id number]. All rights reserved. This information is not intended as a substitute for professional medical care. Always follow your healthcare professional's instructions.    ANESTHESIA  -For the first 24 hours after surgery:  Do not drive, use heavy equipment, make important decisions, or drink alcohol  -It is normal to feel sleepy for several hours.  Rest until you are more awake.  -Have someone stay with you, if needed.  They can watch for problems and help keep you safe.  -Some possible post anesthesia side effects include: nausea and vomiting, sore throat and hoarseness, sleepiness, and dizziness.    PAIN  -If you have pain after surgery, pain medicine will help you feel better.  Take it as directed, before pain becomes severe.  Most pain relievers taken by mouth need at least 20-30 minutes to start working.  -Do not drive or drink alcohol while taking pain medicine.  -Pain medication can upset your stomach.  Taking them with a little food may help.  -Other ways to help control pain: elevation, ice, and relaxation  -Call your surgeon if still having unmanageable pain an hour  after taking pain medicine.  -Pain medicine can cause constipation.  Taking an over-the counter stool softener while on prescription pain medicine and drinking plenty of fluids can prevent this side effect.  -Call your surgeon if you have severe side effects like: breathing problems, trouble waking up, dizziness, confusion, or severe constipation.    NAUSEA  -Some people have nausea after surgery.  This is often because of anesthesia, pain, pain medicine, or the stress of surgery.  -Do not push yourself to eat.  Start off with clear liquids and soup.  Slowly move to solid foods.  Don't eat fatty, rich, spicy foods at first.  Eat smaller amounts.  -If you develop persistent nausea and vomiting please notify your surgeon immediately.    BLEEDING  -Different types of surgery require different types of care and dressing changes.  It is important to follow all instructions and advice from your surgeon.  Change dressing as directed.  Call your surgeon for any concerns regarding postop bleeding.    SIGNS OF INFECTION  -Signs of infection include: fever, swelling, drainage, and redness  -Notify your surgeon if you have a fever of 100.4 F (38.0 C) or higher.  -Notify your surgeon if you notice redness, swelling, increased pain, pus, or a foul smell at the incision site.      Oxycodone tablets or capsules  What is this medicine?  OXYCODONE (ox i KOE done) is a pain reliever. It is used to treat moderate to severe pain.  How should I use this medicine?  Take this medicine by mouth with a glass of water. Follow the directions on the prescription label. You can take it with or without food. If it upsets your stomach, take it with food. Take your medicine at regular intervals. Do not take it more often than directed. Do not stop taking except on your doctor's advice.  Some brands of this medicine, like Oxecta, have special instructions. Ask your doctor or pharmacist if these directions are for you: Do not cut, crush or chew this  medicine. Swallow only one tablet at a time. Do not wet, soak, or lick the tablet before you take it.  A special MedGuide will be given to you by the pharmacist with each prescription and refill. Be sure to read this information carefully each time.  Talk to your pediatrician regarding the use of this medicine in children. Special care may be needed.  What side effects may I notice from receiving this medicine?  Side effects that you should report to your doctor or health care professional as soon as possible:  · allergic reactions like skin rash, itching or hives, swelling of the face, lips, or tongue  · breathing problems  · confusion  · signs and symptoms of low blood pressure like dizziness; feeling faint or lightheaded, falls; unusually weak or tired  · trouble passing urine or change in the amount of urine  · trouble swallowing  Side effects that usually do not require medical attention (report to your doctor or health care professional if they continue or are bothersome):  · constipation  · dry mouth  · nausea, vomiting  · tiredness  What may interact with this medicine?  This medicine may interact with the following medications:  · alcohol  · antihistamines for allergy, cough and cold  · antiviral medicines for HIV or AIDS  · atropine  · certain antibiotics like clarithromycin, erythromycin, linezolid, rifampin  · certain medicines for anxiety or sleep  · certain medicines for bladder problems like oxybutynin, tolterodine  · certain medicines for depression like amitriptyline, fluoxetine, sertraline  · certain medicines for fungal infections like ketoconazole, itraconazole, voriconazole  · certain medicines for migraine headache like almotriptan, eletriptan, frovatriptan, naratriptan, rizatriptan, sumatriptan, zolmitriptan  · certain medicines for nausea or vomiting like dolasetron, ondansetron, palonosetron  · certain medicines for Parkinson's disease like benztropine, trihexyphenidyl  · certain medicines  for seizures like phenobarbital, phenytoin, primidone  · certain medicines for stomach problems like dicyclomine, hyoscyamine  · certain medicines for travel sickness like scopolamine  · diuretics  · general anesthetics like halothane, isoflurane, methoxyflurane, propofol  · ipratropium  · local anesthetics like lidocaine, pramoxine, tetracaine  · MAOIs like Carbex, Eldepryl, Marplan, Nardil, and Parnate  · medicines that relax muscles for surgery  · methylene blue  · nilotinib  · other narcotic medicines for pain or cough  · phenothiazines like chlorpromazine, mesoridazine, prochlorperazine, thioridazine  What if I miss a dose?  If you miss a dose, take it as soon as you can. If it is almost time for your next dose, take only that dose. Do not take double or extra doses.  Where should I keep my medicine?  Keep out of the reach of children. This medicine can be abused. Keep your medicine in a safe place to protect it from theft. Do not share this medicine with anyone. Selling or giving away this medicine is dangerous and against the law.  Store at room temperature between 15 and 30 degrees C (59 and 86 degrees F). Protect from light. Keep container tightly closed.  This medicine may cause accidental overdose and death if it is taken by other adults, children, or pets. Flush any unused medicine down the toilet to reduce the chance of harm. Do not use the medicine after the expiration date.  What should I tell my health care provider before I take this medicine?  They need to know if you have any of these conditions:  · Akshat's disease  · brain tumor  · head injury  · heart disease  · history of drug or alcohol abuse problem  · if you often drink alcohol  · kidney disease  · liver disease  · lung or breathing disease, like asthma  · mental illness  · pancreatic disease  · seizures  · thyroid disease  · an unusual or allergic reaction to oxycodone, codeine, hydrocodone, morphine, other medicines, foods, dyes, or  preservatives  · pregnant or trying to get pregnant  · breast-feeding  What should I watch for while using this medicine?  Tell your doctor or health care professional if your pain does not go away, if it gets worse, or if you have new or a different type of pain. You may develop tolerance to the medicine. Tolerance means that you will need a higher dose of the medicine for pain relief. Tolerance is normal and is expected if you take this medicine for a long time.  Do not suddenly stop taking your medicine because you may develop a severe reaction. Your body becomes used to the medicine. This does NOT mean you are addicted. Addiction is a behavior related to getting and using a drug for a non-medical reason. If you have pain, you have a medical reason to take pain medicine. Your doctor will tell you how much medicine to take. If your doctor wants you to stop the medicine, the dose will be slowly lowered over time to avoid any side effects.  There are different types of narcotic medicines (opiates). If you take more than one type at the same time or if you are taking another medicine that also causes drowsiness, you may have more side effects. Give your health care provider a list of all medicines you use. Your doctor will tell you how much medicine to take. Do not take more medicine than directed. Call emergency for help if you have problems breathing or unusual sleepiness.  You may get drowsy or dizzy. Do not drive, use machinery, or do anything that needs mental alertness until you know how the medicine affects you. Do not stand or sit up quickly, especially if you are an older patient. This reduces the risk of dizzy or fainting spells. Alcohol may interfere with the effect of this medicine. Avoid alcoholic drinks.  This medicine will cause constipation. Try to have a bowel movement at least every 2 to 3 days. If you do not have a bowel movement for 3 days, call your doctor or health care professional.  Your mouth  may get dry. Chewing sugarless gum or sucking hard candy, and drinking plenty of water may help. Contact your doctor if the problem does not go away or is severe.  NOTE:This sheet is a summary. It may not cover all possible information. If you have questions about this medicine, talk to your doctor, pharmacist, or health care provider. Copyright© 2017 [employer]

## 2020-01-24 ENCOUNTER — PROCEDURE VISIT (OUTPATIENT)
Dept: OBSTETRICS AND GYNECOLOGY | Facility: CLINIC | Age: 45
End: 2020-01-24
Payer: MEDICAID

## 2020-01-24 ENCOUNTER — OFFICE VISIT (OUTPATIENT)
Dept: OBSTETRICS AND GYNECOLOGY | Facility: CLINIC | Age: 45
End: 2020-01-24
Payer: MEDICAID

## 2020-01-24 VITALS
SYSTOLIC BLOOD PRESSURE: 112 MMHG | DIASTOLIC BLOOD PRESSURE: 80 MMHG | BODY MASS INDEX: 46.71 KG/M2 | WEIGHT: 280.69 LBS

## 2020-01-24 DIAGNOSIS — Z71.2 ENCOUNTER TO DISCUSS TEST RESULTS: Primary | ICD-10-CM

## 2020-01-24 DIAGNOSIS — Z87.42 HISTORY OF OVARIAN CYST: ICD-10-CM

## 2020-01-24 PROCEDURE — 99212 OFFICE O/P EST SF 10 MIN: CPT | Mod: PBBFAC,PO,25 | Performed by: OBSTETRICS & GYNECOLOGY

## 2020-01-24 PROCEDURE — 99212 OFFICE O/P EST SF 10 MIN: CPT | Mod: S$PBB,25,, | Performed by: OBSTETRICS & GYNECOLOGY

## 2020-01-24 PROCEDURE — 76856 PR  ECHO,PELVIC (NONOBSTETRIC): ICD-10-PCS | Mod: 26,S$PBB,, | Performed by: OBSTETRICS & GYNECOLOGY

## 2020-01-24 PROCEDURE — 76830 TRANSVAGINAL US NON-OB: CPT | Mod: 26,S$PBB,, | Performed by: OBSTETRICS & GYNECOLOGY

## 2020-01-24 PROCEDURE — 99999 PR PBB SHADOW E&M-EST. PATIENT-LVL II: ICD-10-PCS | Mod: PBBFAC,,, | Performed by: OBSTETRICS & GYNECOLOGY

## 2020-01-24 PROCEDURE — 99999 PR PBB SHADOW E&M-EST. PATIENT-LVL II: CPT | Mod: PBBFAC,,, | Performed by: OBSTETRICS & GYNECOLOGY

## 2020-01-24 PROCEDURE — 76856 US EXAM PELVIC COMPLETE: CPT | Mod: PBBFAC,PO

## 2020-01-24 PROCEDURE — 99212 PR OFFICE/OUTPT VISIT, EST, LEVL II, 10-19 MIN: ICD-10-PCS | Mod: S$PBB,25,, | Performed by: OBSTETRICS & GYNECOLOGY

## 2020-01-24 PROCEDURE — 76856 US EXAM PELVIC COMPLETE: CPT | Mod: 26,S$PBB,, | Performed by: OBSTETRICS & GYNECOLOGY

## 2020-01-24 PROCEDURE — 76830 PR  ECHOGRAPHY,TRANSVAGINAL: ICD-10-PCS | Mod: 26,S$PBB,, | Performed by: OBSTETRICS & GYNECOLOGY

## 2020-01-24 PROCEDURE — 76830 TRANSVAGINAL US NON-OB: CPT | Mod: PBBFAC,PO

## 2020-01-24 NOTE — PROGRESS NOTES
GYNECOLOGY OFFICE NOTE    Reason for visit: U/S follow-up    HPI: Pt is a 44 y.o.  female  who presents for f/u after U/S previously showed what was believed to be a right ovarian cyst- now thought to have been enlarged right iliac lymph node. Uterine size normal and EMS:4.1mm. Pt still amenorrheic but labs consistent with hypoestrogenism and EMS is thin. Reports had two days of menstrual cramping without flow.    Imaging Review:  U/S:3/2018: Size 65.0 mm x 39.0 mm x 46.0 mm. Vol 61.057 cmÂ³.     U/S:18: 75.0 mm x 49.0 mm x 60.0 mm. Vol 115.454 cmÂ³.     U/S today:20: 3.3cm x 1.6cm x 1.7cm    Past Medical History:   Diagnosis Date    Diabetes mellitus     Hodgkin lymphoma     dx- Aug 15, 2018, chemo , due to start radiation    Hyperlipidemia     Hypertension     Pulmonary embolism 2018    Pulmonary embolism     dx 2018, on lovenox       Past Surgical History:   Procedure Laterality Date    ARTHROSCOPIC DEBRIDEMENT OF SHOULDER Left 2020    Procedure: DEBRIDEMENT, SHOULDER, ARTHROSCOPIC EXTENSIVE ARTHROSCOPIC RELEASE CAPSULAR RELEASE;  Surgeon: Ariel Valladares MD;  Location: [facility];  Service: Orthopedics;  Laterality: Left;  LEFT SHOULDER EXTENSIVE ARTHROSCOPIC DEBRIDEMENT   GETA + INTERSCALENE  VIDEO/confirmed 1/15/2020 1230 KB Christofer    BREAST BIOPSY Right     stereo rt 2018    CHOLECYSTECTOMY  1998    UMBILICAL HERNIA REPAIR  2001    mesh placed       Family History   Problem Relation Age of Onset    Breast cancer Mother     Breast cancer Maternal Cousin     Breast cancer Maternal Aunt     Colon cancer Neg Hx     Ovarian cancer Neg Hx        Social History     Tobacco Use    Smoking status: Never Smoker    Smokeless tobacco: Never Used   Substance Use Topics    Alcohol use: Yes     Comment: Socially    Drug use: No       OB History    Para Term  AB Living   2 2 0     2   SAB TAB Ectopic Multiple Live Births            2      # Outcome Date GA Lbr Aurelio/2nd Weight Sex Delivery Anes PTL Lv   2 Para     F Vag-Spont   VICKIE   1 Para     M Vag-Spont   VICKIE       Current Outpatient Medications   Medication Sig    ACCU-CHEK FASTCLIX Misc     amitriptyline (ELAVIL) 50 MG tablet Take 1 tablet by mouth.    butalbital-acetaminophen-caffeine -40 mg (FIORICET, ESGIC) -40 mg per tablet     cetirizine (ZYRTEC) 10 MG tablet     cholecalciferol, vitamin D3, (VITAMIN D3) 5,000 unit Tab Take 5,000 Units by mouth once daily.    citalopram (CELEXA) 20 MG tablet Take 20 mg by mouth.    clobetasol 0.05% (TEMOVATE) 0.05 % Oint Apply amount of 1/2 of pencil eraser twice daily for two weeks, once daily for two weeks, then Monday and Thursday (Patient not taking: Reported on 1/10/2020)    clonazePAM (KLONOPIN) 0.5 MG tablet Take 0.5 mg by mouth.    cyclobenzaprine (FLEXERIL) 10 MG tablet Take 10 mg by mouth 3 (three) times daily as needed for Muscle spasms.    enoxaparin (LOVENOX) 120 mg/0.8 mL Syrg     ergocalciferol (ERGOCALCIFEROL) 50,000 unit Cap Take 1 capsule by mouth.    gabapentin (NEURONTIN) 400 MG capsule Take 600 mg by mouth 3 (three) times daily.     garlic 1,000 mg Cap Take by mouth.    lisinopril-hydrochlorothiazide (PRINZIDE,ZESTORETIC) 20-12.5 mg per tablet Take 1 tablet by mouth once daily.    metFORMIN (GLUCOPHAGE-XR) 750 MG 24 hr tablet Take 750 mg by mouth daily with breakfast.    niacin (SLO-NIACIN) 500 mg tablet Take 500 mg by mouth 2 (two) times daily with meals.    ONETOUCH ULTRA BLUE TEST STRIP Strp     ONETOUCH ULTRA2 kit     oxyCODONE-acetaminophen (PERCOCET) 5-325 mg per tablet Take 1 tablet by mouth every 4 (four) hours as needed for Pain.    ranitidine (ZANTAC) 150 MG tablet Take 150 mg by mouth 2 (two) times daily.    topiramate (TOPAMAX) 100 MG tablet Take 100 mg by mouth 2 (two) times daily.     No current facility-administered medications for this visit.      Facility-Administered Medications  Ordered in Other Visits   Medication    cefazolin (ANCEF) 2 gram in dextrose 5% 50 mL IVPB (premix)       Allergies: Atorvastatin and Naproxen     /80   Wt 127.3 kg (280 lb 11.2 oz)   BMI 46.71 kg/m²     ROS:  GENERAL: Denies fever or chills.   SKIN: Denies rash or lesions.   HEAD: Denies head injury or headache.   CHEST: Denies chest pain or shortness of breath.   CARDIOVASCULAR: Denies palpitations or chest pain.   ABDOMEN: No abdominal pain, constipation, diarrhea, nausea, vomiting or rectal bleeding.   URINARY: No dysuria, hematuria, or burning on urination.  REPRODUCTIVE: See HPI.   BREASTS: Denies pain, lumps, or nipple discharge.   NEUROLOGIC: Denies syncope or weakness.     Physical Exam:  GENERAL: alert, appears stated age and cooperative  NEUROLOGIC: orientated to person, place and time, normal mood and affect   CHEST: Normal respiratory effort  NECK: normal appearance  SKIN: no acne, striae, hirsutism  Talk only    Diagnosis:  1. Encounter to discuss test results        Plan:   1.Discussed imaging results. And if necessary pelvic MRI could better delineate but ovaries not visualized on today U/S.  We discussed non-hormonal options for management of hotflashes as well (brisdelle)- some meds she is currently taking (clonopin, elavil, etc)      Patient was counseled today on the new ACS guidelines for cervical cytology screening as well as the current recommendations for breast cancer screening. She was counseled to follow up with her PCP for other routine health maintenance.       Tiffanie Rhodes MD  OB/GYN

## 2020-01-27 LAB
FINAL PATHOLOGIC DIAGNOSIS: NORMAL
Lab: NORMAL

## 2020-02-11 ENCOUNTER — PATIENT MESSAGE (OUTPATIENT)
Dept: OBSTETRICS AND GYNECOLOGY | Facility: CLINIC | Age: 45
End: 2020-02-11

## 2020-03-02 ENCOUNTER — OFFICE VISIT (OUTPATIENT)
Dept: OBSTETRICS AND GYNECOLOGY | Facility: CLINIC | Age: 45
End: 2020-03-02
Payer: MEDICAID

## 2020-03-02 VITALS — WEIGHT: 274.69 LBS | BODY MASS INDEX: 45.71 KG/M2

## 2020-03-02 DIAGNOSIS — R39.9 UTI SYMPTOMS: Primary | ICD-10-CM

## 2020-03-02 LAB
BILIRUB SERPL-MCNC: ABNORMAL MG/DL
BLOOD URINE, POC: ABNORMAL
COLOR, POC UA: ABNORMAL
GLUCOSE UR QL STRIP: ABNORMAL
KETONES UR QL STRIP: ABNORMAL
LEUKOCYTE ESTERASE URINE, POC: ABNORMAL
NITRITE, POC UA: ABNORMAL
PH, POC UA: ABNORMAL
PROTEIN, POC: ABNORMAL
SPECIFIC GRAVITY, POC UA: ABNORMAL
UROBILINOGEN, POC UA: ABNORMAL

## 2020-03-02 PROCEDURE — 99999 PR PBB SHADOW E&M-EST. PATIENT-LVL II: CPT | Mod: PBBFAC,,, | Performed by: OBSTETRICS & GYNECOLOGY

## 2020-03-02 PROCEDURE — 99213 PR OFFICE/OUTPT VISIT, EST, LEVL III, 20-29 MIN: ICD-10-PCS | Mod: S$PBB,,, | Performed by: OBSTETRICS & GYNECOLOGY

## 2020-03-02 PROCEDURE — 81002 URINALYSIS NONAUTO W/O SCOPE: CPT | Mod: PBBFAC,PO | Performed by: OBSTETRICS & GYNECOLOGY

## 2020-03-02 PROCEDURE — 99213 OFFICE O/P EST LOW 20 MIN: CPT | Mod: S$PBB,,, | Performed by: OBSTETRICS & GYNECOLOGY

## 2020-03-02 PROCEDURE — 99999 PR PBB SHADOW E&M-EST. PATIENT-LVL II: ICD-10-PCS | Mod: PBBFAC,,, | Performed by: OBSTETRICS & GYNECOLOGY

## 2020-03-02 PROCEDURE — 87086 URINE CULTURE/COLONY COUNT: CPT

## 2020-03-02 PROCEDURE — 99212 OFFICE O/P EST SF 10 MIN: CPT | Mod: PBBFAC,PO | Performed by: OBSTETRICS & GYNECOLOGY

## 2020-03-02 RX ORDER — NITROFURANTOIN 25; 75 MG/1; MG/1
100 CAPSULE ORAL 2 TIMES DAILY
Qty: 14 CAPSULE | Refills: 0 | Status: SHIPPED | OUTPATIENT
Start: 2020-03-02 | End: 2020-03-09

## 2020-03-02 NOTE — PROGRESS NOTES
GYNECOLOGY OFFICE NOTE    Reason for visit: UTI symptoms    HPI: Pt is a 44 y.o.  female  who presents for evaluation of UTI symptoms (frequency and hematuria) since Saturday. Denies fever, chills, back pain or dysuria.    Past Medical History:   Diagnosis Date    Diabetes mellitus     Hodgkin lymphoma     dx- Aug 15, 2018, chemo , due to start radiation    Hyperlipidemia     Hypertension     Pulmonary embolism 2018    Pulmonary embolism     dx 2018, on lovenox       Past Surgical History:   Procedure Laterality Date    ARTHROSCOPIC DEBRIDEMENT OF SHOULDER Left 2020    Procedure: DEBRIDEMENT, SHOULDER, ARTHROSCOPIC EXTENSIVE ARTHROSCOPIC RELEASE CAPSULAR RELEASE;  Surgeon: Ariel Valladares MD;  Location: [facility] OR;  Service: Orthopedics;  Laterality: Left;  LEFT SHOULDER EXTENSIVE ARTHROSCOPIC DEBRIDEMENT   GETA + INTERSCALENE  VIDEO/confirmed 1/15/2020 1230 KB Christofer    BREAST BIOPSY Right     stereo rt 2018    CHOLECYSTECTOMY  1998    UMBILICAL HERNIA REPAIR  2001    mesh placed       Family History   Problem Relation Age of Onset    Breast cancer Mother     Breast cancer Maternal Cousin     Breast cancer Maternal Aunt     Colon cancer Neg Hx     Ovarian cancer Neg Hx        Social History     Tobacco Use    Smoking status: Never Smoker    Smokeless tobacco: Never Used   Substance Use Topics    Alcohol use: Yes     Comment: Socially    Drug use: No       OB History    Para Term  AB Living   2 2 0     2   SAB TAB Ectopic Multiple Live Births           2      # Outcome Date GA Lbr Aurelio/2nd Weight Sex Delivery Anes PTL Lv   2 Para     F Vag-Spont   VICKIE   1 Para     M Vag-Spont   VICKIE       Current Outpatient Medications   Medication Sig    ACCU-CHEK FASTCLIX Misc     amitriptyline (ELAVIL) 50 MG tablet Take 1 tablet by mouth.    butalbital-acetaminophen-caffeine -40 mg (FIORICET, ESGIC) -40 mg per tablet      cetirizine (ZYRTEC) 10 MG tablet     cholecalciferol, vitamin D3, (VITAMIN D3) 5,000 unit Tab Take 5,000 Units by mouth once daily.    citalopram (CELEXA) 20 MG tablet Take 20 mg by mouth.    clobetasol 0.05% (TEMOVATE) 0.05 % Oint Apply amount of 1/2 of pencil eraser twice daily for two weeks, once daily for two weeks, then Monday and Thursday    clonazePAM (KLONOPIN) 0.5 MG tablet Take 0.5 mg by mouth.    cyclobenzaprine (FLEXERIL) 10 MG tablet Take 10 mg by mouth 3 (three) times daily as needed for Muscle spasms.    enoxaparin (LOVENOX) 120 mg/0.8 mL Syrg     ergocalciferol (ERGOCALCIFEROL) 50,000 unit Cap Take 1 capsule by mouth.    gabapentin (NEURONTIN) 400 MG capsule Take 600 mg by mouth 3 (three) times daily.     garlic 1,000 mg Cap Take by mouth.    lisinopril-hydrochlorothiazide (PRINZIDE,ZESTORETIC) 20-12.5 mg per tablet Take 1 tablet by mouth once daily.    metFORMIN (GLUCOPHAGE-XR) 750 MG 24 hr tablet Take 750 mg by mouth daily with breakfast.    niacin (SLO-NIACIN) 500 mg tablet Take 500 mg by mouth 2 (two) times daily with meals.    ONETOUCH ULTRA BLUE TEST STRIP Strp     ONETOUCH ULTRA2 kit     oxyCODONE-acetaminophen (PERCOCET) 5-325 mg per tablet Take 1 tablet by mouth every 4 (four) hours as needed for Pain.    topiramate (TOPAMAX) 100 MG tablet Take 100 mg by mouth 2 (two) times daily.    nitrofurantoin, macrocrystal-monohydrate, (MACROBID) 100 MG capsule Take 1 capsule (100 mg total) by mouth 2 (two) times daily. for 7 days    ranitidine (ZANTAC) 150 MG tablet Take 150 mg by mouth 2 (two) times daily.     No current facility-administered medications for this visit.      Facility-Administered Medications Ordered in Other Visits   Medication    cefazolin (ANCEF) 2 gram in dextrose 5% 50 mL IVPB (premix)       Allergies: Atorvastatin and Naproxen     Wt 124.6 kg (274 lb 11.1 oz)   LMP  (LMP Unknown)   BMI 45.71 kg/m²     ROS:  GENERAL: Denies fever or chills.   SKIN: Denies  rash or lesions.   HEAD: Denies head injury or headache.   CHEST: Denies chest pain or shortness of breath.   CARDIOVASCULAR: Denies palpitations or chest pain.   ABDOMEN: No constipation, diarrhea, nausea, vomiting or rectal bleeding.   URINARY: No dysuria, + hematuria, denies burning on urination.  REPRODUCTIVE: See HPI.   BREASTS: see HPI  NEUROLOGIC: Denies syncope or weakness.     Physical Exam:  GENERAL: alert, appears stated age and cooperative  NEUROLOGIC: orientated to person, place and time, normal mood and affect   CHEST: Normal respiratory effort  NECK: normal appearance  SKIN: no acne, hirsutism  Talk only    Diagnosis:  1. UTI symptoms        Plan:   1. udip and urine culture ordered. Rx sent while awaiting results- macrobid    Orders Placed This Encounter    Urine culture    POCT urine dipstick without microscope    nitrofurantoin, macrocrystal-monohydrate, (MACROBID) 100 MG capsule       Patient was counseled today on the new ACS guidelines for cervical cytology screening as well as the current recommendations for breast cancer screening. She was counseled to follow up with her PCP for other routine health maintenance.     Follow up if symptoms worsen or fail to improve.      Tiffanie Rhodes MD  OB/GYN

## 2020-03-04 LAB
BACTERIA UR CULT: NORMAL
BACTERIA UR CULT: NORMAL

## 2020-05-11 ENCOUNTER — HOSPITAL ENCOUNTER (OUTPATIENT)
Dept: RADIOLOGY | Facility: HOSPITAL | Age: 45
Discharge: HOME | End: 2020-05-11
Attending: OBSTETRICS & GYNECOLOGY
Payer: MEDICAID

## 2020-05-11 DIAGNOSIS — Z12.39 SCREENING FOR BREAST CANCER: ICD-10-CM

## 2020-05-11 PROCEDURE — 77067 SCR MAMMO BI INCL CAD: CPT | Mod: TC

## 2020-05-11 PROCEDURE — 77063 BREAST TOMOSYNTHESIS BI: CPT | Mod: 26,,, | Performed by: RADIOLOGY

## 2020-05-11 PROCEDURE — 77067 MAMMO DIGITAL SCREENING BILAT WITH TOMOSYNTHESIS_CAD: ICD-10-PCS | Mod: 26,,, | Performed by: RADIOLOGY

## 2020-05-11 PROCEDURE — 77067 SCR MAMMO BI INCL CAD: CPT | Mod: 26,,, | Performed by: RADIOLOGY

## 2020-05-11 PROCEDURE — 77063 MAMMO DIGITAL SCREENING BILAT WITH TOMOSYNTHESIS_CAD: ICD-10-PCS | Mod: 26,,, | Performed by: RADIOLOGY

## 2020-05-12 ENCOUNTER — TELEPHONE (OUTPATIENT)
Dept: RADIOLOGY | Facility: HOSPITAL | Age: 45
End: 2020-05-12

## 2020-05-12 NOTE — TELEPHONE ENCOUNTER
Spoke with patient and explained mammogram findings.Patient expressed understanding of results. Patient scheduled abnormal mammogram follow up appointment at [facility] on 5/13/2020.

## 2020-05-13 ENCOUNTER — HOSPITAL ENCOUNTER (OUTPATIENT)
Dept: RADIOLOGY | Facility: HOSPITAL | Age: 45
Discharge: HOME | End: 2020-05-13
Attending: OBSTETRICS & GYNECOLOGY
Payer: MEDICAID

## 2020-05-13 DIAGNOSIS — R92.8 ABNORMAL MAMMOGRAM: ICD-10-CM

## 2020-05-13 PROCEDURE — 77061 BREAST TOMOSYNTHESIS UNI: CPT | Mod: TC,PO

## 2020-05-13 PROCEDURE — 77061 BREAST TOMOSYNTHESIS UNI: CPT | Mod: 26,,, | Performed by: RADIOLOGY

## 2020-05-13 PROCEDURE — 76642 ULTRASOUND BREAST LIMITED: CPT | Mod: TC,PO,RT

## 2020-05-13 PROCEDURE — 77061 MAMMO DIGITAL DIAGNOSTIC RIGHT WITH TOMOSYNTHESIS_CAD: ICD-10-PCS | Mod: 26,,, | Performed by: RADIOLOGY

## 2020-05-13 PROCEDURE — 77065 DX MAMMO INCL CAD UNI: CPT | Mod: 26,,, | Performed by: RADIOLOGY

## 2020-05-13 PROCEDURE — 76642 US BREAST RIGHT LIMITED: ICD-10-PCS | Mod: 26,RT,, | Performed by: RADIOLOGY

## 2020-05-13 PROCEDURE — 77065 MAMMO DIGITAL DIAGNOSTIC RIGHT WITH TOMOSYNTHESIS_CAD: ICD-10-PCS | Mod: 26,,, | Performed by: RADIOLOGY

## 2020-05-13 PROCEDURE — 76642 ULTRASOUND BREAST LIMITED: CPT | Mod: 26,RT,, | Performed by: RADIOLOGY

## 2020-05-13 PROCEDURE — 77065 DX MAMMO INCL CAD UNI: CPT | Mod: TC,PO

## 2020-05-19 ENCOUNTER — PATIENT MESSAGE (OUTPATIENT)
Dept: OBSTETRICS AND GYNECOLOGY | Facility: CLINIC | Age: 45
End: 2020-05-19

## 2020-05-19 ENCOUNTER — TELEPHONE (OUTPATIENT)
Dept: OBSTETRICS AND GYNECOLOGY | Facility: CLINIC | Age: 45
End: 2020-05-19

## 2020-05-19 NOTE — TELEPHONE ENCOUNTER
Called to inform pt of recent imaging results s/p comparison to prior imaging.  Pt unable to talk. States she will call back-- wanted to discuss results-->No suspicious findings noted- recommend to continue annual screening and continue care with med onc.     Tiffanie Rhodes MD, FACOG  OB/GYN

## 2020-05-19 NOTE — TELEPHONE ENCOUNTER
Contacted pt as requested, Informed pt Dr. Rhodes was calling of recent imaging results s/p comparison to prior imaging. No suspicious findings noted- recommend to continue annual screening and continue care with med onc. Patient verbalized understanding.

## 2020-11-10 ENCOUNTER — HOSPITAL ENCOUNTER (OUTPATIENT)
Dept: RADIOLOGY | Facility: HOSPITAL | Age: 45
Discharge: HOME | End: 2020-11-10
Attending: ORTHOPAEDIC SURGERY
Payer: MEDICAID

## 2020-11-10 DIAGNOSIS — S89.91XA KNEE INJURY, RIGHT, INITIAL ENCOUNTER: ICD-10-CM

## 2020-11-10 DIAGNOSIS — S89.91XA KNEE INJURY, RIGHT, INITIAL ENCOUNTER: Primary | ICD-10-CM

## 2020-11-10 PROCEDURE — 73564 X-RAY EXAM KNEE 4 OR MORE: CPT | Mod: TC,FY,RT

## 2020-11-10 PROCEDURE — 73564 X-RAY EXAM KNEE 4 OR MORE: CPT | Mod: 26,RT,, | Performed by: RADIOLOGY

## 2020-11-10 PROCEDURE — 73564 XR KNEE COMP 4 OR MORE VIEWS RIGHT: ICD-10-PCS | Mod: 26,RT,, | Performed by: RADIOLOGY

## 2020-11-28 ENCOUNTER — HOSPITAL ENCOUNTER (OUTPATIENT)
Dept: RADIOLOGY | Facility: HOSPITAL | Age: 45
Discharge: HOME | End: 2020-11-28
Attending: ORTHOPAEDIC SURGERY
Payer: MEDICAID

## 2020-11-28 DIAGNOSIS — S89.91XA INJURY OF RIGHT KNEE, INITIAL ENCOUNTER: ICD-10-CM

## 2020-11-28 PROCEDURE — 73721 MRI KNEE WITHOUT CONTRAST RIGHT: ICD-10-PCS | Mod: 26,RT,, | Performed by: RADIOLOGY

## 2020-11-28 PROCEDURE — 73721 MRI JNT OF LWR EXTRE W/O DYE: CPT | Mod: 26,RT,, | Performed by: RADIOLOGY

## 2020-11-28 PROCEDURE — 73721 MRI JNT OF LWR EXTRE W/O DYE: CPT | Mod: TC,RT

## 2020-12-07 ENCOUNTER — PATIENT MESSAGE (OUTPATIENT)
Dept: OBSTETRICS AND GYNECOLOGY | Facility: CLINIC | Age: 45
End: 2020-12-07

## 2020-12-07 ENCOUNTER — PATIENT MESSAGE (OUTPATIENT)
Dept: ORTHOPEDICS | Facility: CLINIC | Age: 45
End: 2020-12-07

## 2020-12-09 ENCOUNTER — TELEPHONE (OUTPATIENT)
Dept: ORTHOPEDICS | Facility: CLINIC | Age: 45
End: 2020-12-09

## 2020-12-09 NOTE — TELEPHONE ENCOUNTER
I spoke to the patient via the telephone.  In summary, Mrs. Mitchell is a 45-year-old female who recently underwent MRI due to acute knee pain.  I have reviewed her plain x-rays and MRI of her right knee.  The images demonstrate advanced tricompartmental arthritic changes.  I have explained this to Mrs. Mitchell in detail.  I have discussed both non operative and operative options.  At this time, I would recommend that she undergo a corticosteroid injection.  I will ask the  team to make her an appointment in the near future for the injection.

## 2020-12-14 ENCOUNTER — CLINICAL SUPPORT (OUTPATIENT)
Dept: URGENT CARE | Facility: CLINIC | Age: 45
End: 2020-12-14

## 2020-12-14 ENCOUNTER — HOSPITAL ENCOUNTER (OUTPATIENT)
Dept: RADIOLOGY | Facility: HOSPITAL | Age: 45
Discharge: HOME | End: 2020-12-14
Attending: OBSTETRICS & GYNECOLOGY
Payer: MEDICAID

## 2020-12-14 DIAGNOSIS — Z11.59 ENCOUNTER FOR SCREENING FOR OTHER VIRAL DISEASES: Primary | ICD-10-CM

## 2020-12-14 DIAGNOSIS — Z87.42 HISTORY OF OVARIAN CYST: ICD-10-CM

## 2020-12-14 LAB
CTP QC/QA: YES
SARS-COV-2 RDRP RESP QL NAA+PROBE: NEGATIVE

## 2020-12-14 PROCEDURE — 76830 US PELVIS COMP WITH TRANSVAG NON-OB (XPD): ICD-10-PCS | Mod: 26,,, | Performed by: RADIOLOGY

## 2020-12-14 PROCEDURE — U0002 COVID-19 LAB TEST NON-CDC: HCPCS | Mod: QW,S$GLB,, | Performed by: NURSE PRACTITIONER

## 2020-12-14 PROCEDURE — 76830 TRANSVAGINAL US NON-OB: CPT | Mod: TC

## 2020-12-14 PROCEDURE — U0002: ICD-10-PCS | Mod: QW,S$GLB,, | Performed by: NURSE PRACTITIONER

## 2020-12-14 PROCEDURE — 76856 US EXAM PELVIC COMPLETE: CPT | Mod: 26,,, | Performed by: RADIOLOGY

## 2020-12-14 PROCEDURE — 76856 US PELVIS COMP WITH TRANSVAG NON-OB (XPD): ICD-10-PCS | Mod: 26,,, | Performed by: RADIOLOGY

## 2020-12-14 PROCEDURE — 76830 TRANSVAGINAL US NON-OB: CPT | Mod: 26,,, | Performed by: RADIOLOGY

## 2021-01-05 ENCOUNTER — PATIENT MESSAGE (OUTPATIENT)
Dept: OBSTETRICS AND GYNECOLOGY | Facility: CLINIC | Age: 46
End: 2021-01-05

## 2021-01-05 ENCOUNTER — OFFICE VISIT (OUTPATIENT)
Dept: ORTHOPEDICS | Facility: CLINIC | Age: 46
End: 2021-01-05
Payer: MEDICAID

## 2021-01-05 DIAGNOSIS — M17.11 PRIMARY OSTEOARTHRITIS OF RIGHT KNEE: Primary | ICD-10-CM

## 2021-01-05 PROCEDURE — 99499 UNLISTED E&M SERVICE: CPT | Mod: S$PBB,,, | Performed by: ORTHOPAEDIC SURGERY

## 2021-01-05 PROCEDURE — 99499 NO LOS: ICD-10-PCS | Mod: S$PBB,,, | Performed by: ORTHOPAEDIC SURGERY

## 2021-01-12 ENCOUNTER — OFFICE VISIT (OUTPATIENT)
Dept: ORTHOPEDICS | Facility: CLINIC | Age: 46
End: 2021-01-12
Payer: MEDICAID

## 2021-01-12 VITALS
BODY MASS INDEX: 47.82 KG/M2 | SYSTOLIC BLOOD PRESSURE: 114 MMHG | WEIGHT: 287 LBS | HEIGHT: 65 IN | DIASTOLIC BLOOD PRESSURE: 79 MMHG | HEART RATE: 94 BPM

## 2021-01-12 DIAGNOSIS — M17.11 PRIMARY OSTEOARTHRITIS OF RIGHT KNEE: Primary | ICD-10-CM

## 2021-01-12 PROCEDURE — 20610 DRAIN/INJ JOINT/BURSA W/O US: CPT | Mod: PBBFAC,PN | Performed by: ORTHOPAEDIC SURGERY

## 2021-01-12 PROCEDURE — 99499 NO LOS: ICD-10-PCS | Mod: S$PBB,,, | Performed by: ORTHOPAEDIC SURGERY

## 2021-01-12 PROCEDURE — 99999 PR PBB SHADOW E&M-EST. PATIENT-LVL IV: ICD-10-PCS | Mod: PBBFAC,,, | Performed by: ORTHOPAEDIC SURGERY

## 2021-01-12 PROCEDURE — 99214 OFFICE O/P EST MOD 30 MIN: CPT | Mod: PBBFAC,PN | Performed by: ORTHOPAEDIC SURGERY

## 2021-01-12 PROCEDURE — 99499 UNLISTED E&M SERVICE: CPT | Mod: S$PBB,,, | Performed by: ORTHOPAEDIC SURGERY

## 2021-01-12 PROCEDURE — 99999 PR PBB SHADOW E&M-EST. PATIENT-LVL IV: CPT | Mod: PBBFAC,,, | Performed by: ORTHOPAEDIC SURGERY

## 2021-01-12 PROCEDURE — 20610 LARGE JOINT ASPIRATION/INJECTION: R KNEE: ICD-10-PCS | Mod: S$PBB,RT,, | Performed by: ORTHOPAEDIC SURGERY

## 2021-01-12 RX ORDER — TRIAMCINOLONE ACETONIDE 40 MG/ML
40 INJECTION, SUSPENSION INTRA-ARTICULAR; INTRAMUSCULAR
Status: DISCONTINUED | OUTPATIENT
Start: 2021-01-12 | End: 2021-01-12 | Stop reason: HOSPADM

## 2021-01-12 RX ADMIN — TRIAMCINOLONE ACETONIDE 40 MG: 40 INJECTION, SUSPENSION INTRA-ARTICULAR; INTRAMUSCULAR at 08:01

## 2021-02-23 ENCOUNTER — PATIENT MESSAGE (OUTPATIENT)
Dept: OBSTETRICS AND GYNECOLOGY | Facility: CLINIC | Age: 46
End: 2021-02-23

## 2021-02-23 ENCOUNTER — PATIENT MESSAGE (OUTPATIENT)
Dept: ORTHOPEDICS | Facility: CLINIC | Age: 46
End: 2021-02-23

## 2021-02-23 ENCOUNTER — OFFICE VISIT (OUTPATIENT)
Dept: OBSTETRICS AND GYNECOLOGY | Facility: CLINIC | Age: 46
End: 2021-02-23
Payer: MEDICAID

## 2021-02-23 VITALS
DIASTOLIC BLOOD PRESSURE: 76 MMHG | WEIGHT: 285.38 LBS | SYSTOLIC BLOOD PRESSURE: 124 MMHG | BODY MASS INDEX: 47.49 KG/M2

## 2021-02-23 DIAGNOSIS — Z01.419 WELL WOMAN EXAM WITH ROUTINE GYNECOLOGICAL EXAM: Primary | ICD-10-CM

## 2021-02-23 DIAGNOSIS — Z12.4 SCREENING FOR CERVICAL CANCER: ICD-10-CM

## 2021-02-23 DIAGNOSIS — Z12.31 BREAST CANCER SCREENING BY MAMMOGRAM: ICD-10-CM

## 2021-02-23 PROCEDURE — 99396 PR PREVENTIVE VISIT,EST,40-64: ICD-10-PCS | Mod: S$PBB,,, | Performed by: OBSTETRICS & GYNECOLOGY

## 2021-02-23 PROCEDURE — 99999 PR PBB SHADOW E&M-EST. PATIENT-LVL IV: ICD-10-PCS | Mod: PBBFAC,,, | Performed by: OBSTETRICS & GYNECOLOGY

## 2021-02-23 PROCEDURE — 88175 CYTOPATH C/V AUTO FLUID REDO: CPT | Performed by: OBSTETRICS & GYNECOLOGY

## 2021-02-23 PROCEDURE — 99999 PR PBB SHADOW E&M-EST. PATIENT-LVL IV: CPT | Mod: PBBFAC,,, | Performed by: OBSTETRICS & GYNECOLOGY

## 2021-02-23 PROCEDURE — 99396 PREV VISIT EST AGE 40-64: CPT | Mod: S$PBB,,, | Performed by: OBSTETRICS & GYNECOLOGY

## 2021-02-23 PROCEDURE — 99214 OFFICE O/P EST MOD 30 MIN: CPT | Mod: PBBFAC,PO | Performed by: OBSTETRICS & GYNECOLOGY

## 2021-02-23 RX ORDER — PANTOPRAZOLE SODIUM 40 MG/1
40 TABLET, DELAYED RELEASE ORAL
COMMUNITY
Start: 2020-09-02

## 2021-02-23 RX ORDER — DICLOFENAC SODIUM 75 MG/1
75 TABLET, DELAYED RELEASE ORAL DAILY
COMMUNITY
Start: 2021-02-08 | End: 2021-08-20

## 2021-02-23 RX ORDER — FLUCONAZOLE 150 MG/1
TABLET ORAL
Qty: 2 TABLET | Refills: 1 | Status: SHIPPED | OUTPATIENT
Start: 2021-02-23 | End: 2021-04-14

## 2021-02-23 RX ORDER — FAMOTIDINE 20 MG/1
TABLET, FILM COATED ORAL
COMMUNITY
Start: 2020-09-02

## 2021-02-23 RX ORDER — DOXEPIN HYDROCHLORIDE 10 MG/1
CAPSULE ORAL
COMMUNITY
Start: 2021-02-11

## 2021-02-23 RX ORDER — BUSPIRONE HYDROCHLORIDE 15 MG/1
15 TABLET ORAL 2 TIMES DAILY
COMMUNITY
Start: 2021-02-11 | End: 2023-09-20

## 2021-02-23 RX ORDER — HYDROXYZINE HYDROCHLORIDE 50 MG/1
50 TABLET, FILM COATED ORAL
COMMUNITY
End: 2023-09-20

## 2021-02-23 RX ORDER — ALBUTEROL SULFATE 90 UG/1
AEROSOL, METERED RESPIRATORY (INHALATION)
COMMUNITY
Start: 2021-02-08

## 2021-02-23 RX ORDER — CITALOPRAM 40 MG/1
40 TABLET, FILM COATED ORAL
COMMUNITY
Start: 2020-09-02

## 2021-02-23 RX ORDER — RIVAROXABAN 20 MG/1
TABLET, FILM COATED ORAL
COMMUNITY
Start: 2021-01-16

## 2021-03-03 LAB
FINAL PATHOLOGIC DIAGNOSIS: NORMAL
Lab: NORMAL

## 2021-03-05 ENCOUNTER — IMMUNIZATION (OUTPATIENT)
Dept: INTERNAL MEDICINE | Facility: CLINIC | Age: 46
End: 2021-03-05
Payer: MEDICAID

## 2021-03-05 DIAGNOSIS — Z23 NEED FOR VACCINATION: Primary | ICD-10-CM

## 2021-03-05 PROCEDURE — 91300 COVID-19, MRNA, LNP-S, PF, 30 MCG/0.3 ML DOSE VACCINE: CPT | Mod: PBBFAC | Performed by: INTERNAL MEDICINE

## 2021-03-10 ENCOUNTER — OFFICE VISIT (OUTPATIENT)
Dept: ORTHOPEDICS | Facility: CLINIC | Age: 46
End: 2021-03-10
Payer: MEDICAID

## 2021-03-10 VITALS
HEART RATE: 102 BPM | BODY MASS INDEX: 46.73 KG/M2 | SYSTOLIC BLOOD PRESSURE: 139 MMHG | WEIGHT: 280.44 LBS | DIASTOLIC BLOOD PRESSURE: 85 MMHG | HEIGHT: 65 IN

## 2021-03-10 DIAGNOSIS — M19.012 OSTEOARTHRITIS OF GLENOHUMERAL JOINT, LEFT: ICD-10-CM

## 2021-03-10 DIAGNOSIS — M17.11 PRIMARY OSTEOARTHRITIS OF RIGHT KNEE: Primary | ICD-10-CM

## 2021-03-10 DIAGNOSIS — E66.01 MORBID OBESITY WITH BMI OF 45.0-49.9, ADULT: ICD-10-CM

## 2021-03-10 PROCEDURE — 99213 PR OFFICE/OUTPT VISIT, EST, LEVL III, 20-29 MIN: ICD-10-PCS | Mod: 25,S$PBB,, | Performed by: ORTHOPAEDIC SURGERY

## 2021-03-10 PROCEDURE — 20610 DRAIN/INJ JOINT/BURSA W/O US: CPT | Mod: PBBFAC,PN | Performed by: ORTHOPAEDIC SURGERY

## 2021-03-10 PROCEDURE — 99999 PR PBB SHADOW E&M-EST. PATIENT-LVL V: CPT | Mod: PBBFAC,,, | Performed by: ORTHOPAEDIC SURGERY

## 2021-03-10 PROCEDURE — 20610 LARGE JOINT ASPIRATION/INJECTION: L GLENOHUMERAL: ICD-10-PCS | Mod: S$PBB,LT,, | Performed by: ORTHOPAEDIC SURGERY

## 2021-03-10 PROCEDURE — 99213 OFFICE O/P EST LOW 20 MIN: CPT | Mod: 25,S$PBB,, | Performed by: ORTHOPAEDIC SURGERY

## 2021-03-10 PROCEDURE — 99999 PR PBB SHADOW E&M-EST. PATIENT-LVL V: ICD-10-PCS | Mod: PBBFAC,,, | Performed by: ORTHOPAEDIC SURGERY

## 2021-03-10 PROCEDURE — 99215 OFFICE O/P EST HI 40 MIN: CPT | Mod: PBBFAC,PN,25 | Performed by: ORTHOPAEDIC SURGERY

## 2021-03-10 RX ORDER — TRIAMCINOLONE ACETONIDE 40 MG/ML
40 INJECTION, SUSPENSION INTRA-ARTICULAR; INTRAMUSCULAR
Status: DISCONTINUED | OUTPATIENT
Start: 2021-03-10 | End: 2021-03-10 | Stop reason: HOSPADM

## 2021-03-10 RX ADMIN — TRIAMCINOLONE ACETONIDE 40 MG: 40 INJECTION, SUSPENSION INTRA-ARTICULAR; INTRAMUSCULAR at 08:03

## 2021-03-26 ENCOUNTER — IMMUNIZATION (OUTPATIENT)
Dept: INTERNAL MEDICINE | Facility: CLINIC | Age: 46
End: 2021-03-26
Payer: MEDICAID

## 2021-03-26 DIAGNOSIS — Z23 NEED FOR VACCINATION: Primary | ICD-10-CM

## 2021-03-26 PROCEDURE — 91300 COVID-19, MRNA, LNP-S, PF, 30 MCG/0.3 ML DOSE VACCINE: CPT | Mod: PBBFAC | Performed by: INTERNAL MEDICINE

## 2021-03-26 PROCEDURE — 0002A COVID-19, MRNA, LNP-S, PF, 30 MCG/0.3 ML DOSE VACCINE: CPT | Mod: PBBFAC | Performed by: INTERNAL MEDICINE

## 2021-04-06 ENCOUNTER — OFFICE VISIT (OUTPATIENT)
Dept: ORTHOPEDICS | Facility: CLINIC | Age: 46
End: 2021-04-06
Payer: MEDICAID

## 2021-04-06 VITALS
DIASTOLIC BLOOD PRESSURE: 76 MMHG | BODY MASS INDEX: 46.98 KG/M2 | HEART RATE: 109 BPM | SYSTOLIC BLOOD PRESSURE: 126 MMHG | HEIGHT: 65 IN | WEIGHT: 282 LBS

## 2021-04-06 DIAGNOSIS — M17.11 PRIMARY OSTEOARTHRITIS OF RIGHT KNEE: Primary | ICD-10-CM

## 2021-04-06 PROCEDURE — 99999 PR PBB SHADOW E&M-EST. PATIENT-LVL V: ICD-10-PCS | Mod: PBBFAC,,, | Performed by: ORTHOPAEDIC SURGERY

## 2021-04-06 PROCEDURE — 20610 DRAIN/INJ JOINT/BURSA W/O US: CPT | Mod: PBBFAC,PN | Performed by: ORTHOPAEDIC SURGERY

## 2021-04-06 PROCEDURE — 99499 NO LOS: ICD-10-PCS | Mod: S$PBB,,, | Performed by: ORTHOPAEDIC SURGERY

## 2021-04-06 PROCEDURE — 99215 OFFICE O/P EST HI 40 MIN: CPT | Mod: PBBFAC,PN | Performed by: ORTHOPAEDIC SURGERY

## 2021-04-06 PROCEDURE — 99499 UNLISTED E&M SERVICE: CPT | Mod: S$PBB,,, | Performed by: ORTHOPAEDIC SURGERY

## 2021-04-06 PROCEDURE — 99999 PR PBB SHADOW E&M-EST. PATIENT-LVL V: CPT | Mod: PBBFAC,,, | Performed by: ORTHOPAEDIC SURGERY

## 2021-04-06 PROCEDURE — 20610 LARGE JOINT ASPIRATION/INJECTION: R KNEE: ICD-10-PCS | Mod: S$PBB,RT,, | Performed by: ORTHOPAEDIC SURGERY

## 2021-04-06 RX ADMIN — Medication 20 MG: at 11:04

## 2021-04-14 ENCOUNTER — OFFICE VISIT (OUTPATIENT)
Dept: ORTHOPEDICS | Facility: CLINIC | Age: 46
End: 2021-04-14
Payer: MEDICAID

## 2021-04-14 VITALS
SYSTOLIC BLOOD PRESSURE: 115 MMHG | HEIGHT: 65 IN | BODY MASS INDEX: 46.98 KG/M2 | WEIGHT: 282 LBS | DIASTOLIC BLOOD PRESSURE: 78 MMHG | HEART RATE: 101 BPM

## 2021-04-14 DIAGNOSIS — M17.11 PRIMARY OSTEOARTHRITIS OF RIGHT KNEE: Primary | ICD-10-CM

## 2021-04-14 PROCEDURE — 99499 UNLISTED E&M SERVICE: CPT | Mod: S$PBB,,, | Performed by: ORTHOPAEDIC SURGERY

## 2021-04-14 PROCEDURE — 20610 LARGE JOINT ASPIRATION/INJECTION: R KNEE: ICD-10-PCS | Mod: S$PBB,RT,, | Performed by: ORTHOPAEDIC SURGERY

## 2021-04-14 PROCEDURE — 99999 PR PBB SHADOW E&M-EST. PATIENT-LVL IV: ICD-10-PCS | Mod: PBBFAC,,, | Performed by: ORTHOPAEDIC SURGERY

## 2021-04-14 PROCEDURE — 99214 OFFICE O/P EST MOD 30 MIN: CPT | Mod: PBBFAC,PN,25 | Performed by: ORTHOPAEDIC SURGERY

## 2021-04-14 PROCEDURE — 20610 DRAIN/INJ JOINT/BURSA W/O US: CPT | Mod: PBBFAC,PN | Performed by: ORTHOPAEDIC SURGERY

## 2021-04-14 PROCEDURE — 99499 NO LOS: ICD-10-PCS | Mod: S$PBB,,, | Performed by: ORTHOPAEDIC SURGERY

## 2021-04-14 PROCEDURE — 99999 PR PBB SHADOW E&M-EST. PATIENT-LVL IV: CPT | Mod: PBBFAC,,, | Performed by: ORTHOPAEDIC SURGERY

## 2021-04-14 RX ADMIN — Medication 20 MG: at 09:04

## 2021-04-21 ENCOUNTER — OFFICE VISIT (OUTPATIENT)
Dept: ORTHOPEDICS | Facility: CLINIC | Age: 46
End: 2021-04-21
Payer: MEDICAID

## 2021-04-21 VITALS
SYSTOLIC BLOOD PRESSURE: 122 MMHG | DIASTOLIC BLOOD PRESSURE: 79 MMHG | HEART RATE: 93 BPM | HEIGHT: 65 IN | WEIGHT: 282 LBS | BODY MASS INDEX: 46.98 KG/M2

## 2021-04-21 DIAGNOSIS — M17.11 PRIMARY OSTEOARTHRITIS OF RIGHT KNEE: Primary | ICD-10-CM

## 2021-04-21 PROCEDURE — 99215 OFFICE O/P EST HI 40 MIN: CPT | Mod: PBBFAC,PN | Performed by: ORTHOPAEDIC SURGERY

## 2021-04-21 PROCEDURE — 20610 DRAIN/INJ JOINT/BURSA W/O US: CPT | Mod: PBBFAC,PN | Performed by: ORTHOPAEDIC SURGERY

## 2021-04-21 PROCEDURE — 99499 NO LOS: ICD-10-PCS | Mod: S$PBB,,, | Performed by: ORTHOPAEDIC SURGERY

## 2021-04-21 PROCEDURE — 20610 LARGE JOINT ASPIRATION/INJECTION: R KNEE: ICD-10-PCS | Mod: S$PBB,RT,, | Performed by: ORTHOPAEDIC SURGERY

## 2021-04-21 PROCEDURE — 99999 PR PBB SHADOW E&M-EST. PATIENT-LVL V: ICD-10-PCS | Mod: PBBFAC,,, | Performed by: ORTHOPAEDIC SURGERY

## 2021-04-21 PROCEDURE — 99999 PR PBB SHADOW E&M-EST. PATIENT-LVL V: CPT | Mod: PBBFAC,,, | Performed by: ORTHOPAEDIC SURGERY

## 2021-04-21 PROCEDURE — 99499 UNLISTED E&M SERVICE: CPT | Mod: S$PBB,,, | Performed by: ORTHOPAEDIC SURGERY

## 2021-04-21 RX ADMIN — Medication 20 MG: at 08:04

## 2021-06-24 ENCOUNTER — PATIENT MESSAGE (OUTPATIENT)
Dept: OBSTETRICS AND GYNECOLOGY | Facility: CLINIC | Age: 46
End: 2021-06-24

## 2021-08-17 ENCOUNTER — PATIENT MESSAGE (OUTPATIENT)
Dept: ORTHOPEDICS | Facility: CLINIC | Age: 46
End: 2021-08-17

## 2021-08-19 ENCOUNTER — TELEPHONE (OUTPATIENT)
Dept: ORTHOPEDICS | Facility: CLINIC | Age: 46
End: 2021-08-19

## 2021-08-19 DIAGNOSIS — M17.11 PRIMARY OSTEOARTHRITIS OF RIGHT KNEE: Primary | ICD-10-CM

## 2021-08-20 ENCOUNTER — OFFICE VISIT (OUTPATIENT)
Dept: ORTHOPEDICS | Facility: CLINIC | Age: 46
End: 2021-08-20
Payer: MEDICAID

## 2021-08-20 ENCOUNTER — HOSPITAL ENCOUNTER (OUTPATIENT)
Dept: RADIOLOGY | Facility: HOSPITAL | Age: 46
Discharge: HOME | End: 2021-08-20
Attending: ORTHOPAEDIC SURGERY
Payer: MEDICAID

## 2021-08-20 VITALS
SYSTOLIC BLOOD PRESSURE: 128 MMHG | HEIGHT: 65 IN | DIASTOLIC BLOOD PRESSURE: 81 MMHG | HEART RATE: 125 BPM | BODY MASS INDEX: 47.78 KG/M2 | WEIGHT: 286.81 LBS

## 2021-08-20 DIAGNOSIS — M17.11 PRIMARY OSTEOARTHRITIS OF RIGHT KNEE: ICD-10-CM

## 2021-08-20 DIAGNOSIS — M17.11 PRIMARY OSTEOARTHRITIS OF RIGHT KNEE: Primary | ICD-10-CM

## 2021-08-20 PROCEDURE — 99215 OFFICE O/P EST HI 40 MIN: CPT | Mod: PBBFAC,PN | Performed by: ORTHOPAEDIC SURGERY

## 2021-08-20 PROCEDURE — 73560 X-RAY EXAM OF KNEE 1 OR 2: CPT | Mod: 26,LT,, | Performed by: RADIOLOGY

## 2021-08-20 PROCEDURE — 99999 PR PBB SHADOW E&M-EST. PATIENT-LVL V: ICD-10-PCS | Mod: PBBFAC,,, | Performed by: ORTHOPAEDIC SURGERY

## 2021-08-20 PROCEDURE — 99214 OFFICE O/P EST MOD 30 MIN: CPT | Mod: S$PBB,,, | Performed by: ORTHOPAEDIC SURGERY

## 2021-08-20 PROCEDURE — 73560 X-RAY EXAM OF KNEE 1 OR 2: CPT | Mod: TC,FY,LT

## 2021-08-20 PROCEDURE — 73562 XR KNEE ORTHO RIGHT: ICD-10-PCS | Mod: 26,RT,, | Performed by: RADIOLOGY

## 2021-08-20 PROCEDURE — 99214 PR OFFICE/OUTPT VISIT, EST, LEVL IV, 30-39 MIN: ICD-10-PCS | Mod: S$PBB,,, | Performed by: ORTHOPAEDIC SURGERY

## 2021-08-20 PROCEDURE — 73562 X-RAY EXAM OF KNEE 3: CPT | Mod: 26,RT,, | Performed by: RADIOLOGY

## 2021-08-20 PROCEDURE — 73560 XR KNEE ORTHO RIGHT: ICD-10-PCS | Mod: 26,LT,, | Performed by: RADIOLOGY

## 2021-08-20 PROCEDURE — 99999 PR PBB SHADOW E&M-EST. PATIENT-LVL V: CPT | Mod: PBBFAC,,, | Performed by: ORTHOPAEDIC SURGERY

## 2021-08-20 RX ORDER — LOSARTAN POTASSIUM AND HYDROCHLOROTHIAZIDE 12.5; 5 MG/1; MG/1
1 TABLET ORAL
COMMUNITY
Start: 2021-06-25 | End: 2023-09-20

## 2021-08-24 RX ORDER — DICLOFENAC SODIUM 75 MG/1
75 TABLET, DELAYED RELEASE ORAL 2 TIMES DAILY
Qty: 60 TABLET | Refills: 0 | Status: SHIPPED | OUTPATIENT
Start: 2021-08-24 | End: 2022-09-26

## 2021-09-08 ENCOUNTER — PATIENT MESSAGE (OUTPATIENT)
Dept: ORTHOPEDICS | Facility: CLINIC | Age: 46
End: 2021-09-08

## 2021-09-09 ENCOUNTER — TELEPHONE (OUTPATIENT)
Dept: ORTHOPEDICS | Facility: CLINIC | Age: 46
End: 2021-09-09

## 2021-10-01 ENCOUNTER — OFFICE VISIT (OUTPATIENT)
Dept: ORTHOPEDICS | Facility: CLINIC | Age: 46
End: 2021-10-01
Payer: MEDICAID

## 2021-10-01 VITALS
BODY MASS INDEX: 48.82 KG/M2 | WEIGHT: 293 LBS | HEART RATE: 106 BPM | HEIGHT: 65 IN | DIASTOLIC BLOOD PRESSURE: 86 MMHG | SYSTOLIC BLOOD PRESSURE: 143 MMHG

## 2021-10-01 DIAGNOSIS — M17.11 PRIMARY OSTEOARTHRITIS OF RIGHT KNEE: Primary | ICD-10-CM

## 2021-10-01 PROCEDURE — 99499 UNLISTED E&M SERVICE: CPT | Mod: S$PBB,,, | Performed by: ORTHOPAEDIC SURGERY

## 2021-10-01 PROCEDURE — 99214 OFFICE O/P EST MOD 30 MIN: CPT | Mod: PBBFAC,PN,25 | Performed by: ORTHOPAEDIC SURGERY

## 2021-10-01 PROCEDURE — 99999 PR PBB SHADOW E&M-EST. PATIENT-LVL IV: CPT | Mod: PBBFAC,,, | Performed by: ORTHOPAEDIC SURGERY

## 2021-10-01 PROCEDURE — 20610 LARGE JOINT ASPIRATION/INJECTION: R KNEE: ICD-10-PCS | Mod: S$PBB,RT,, | Performed by: ORTHOPAEDIC SURGERY

## 2021-10-01 PROCEDURE — 99999 PR PBB SHADOW E&M-EST. PATIENT-LVL IV: ICD-10-PCS | Mod: PBBFAC,,, | Performed by: ORTHOPAEDIC SURGERY

## 2021-10-01 PROCEDURE — 20610 DRAIN/INJ JOINT/BURSA W/O US: CPT | Mod: PBBFAC,PN | Performed by: ORTHOPAEDIC SURGERY

## 2021-10-01 PROCEDURE — 99499 NO LOS: ICD-10-PCS | Mod: S$PBB,,, | Performed by: ORTHOPAEDIC SURGERY

## 2021-10-01 RX ADMIN — Medication 20 MG: at 08:10

## 2021-10-08 ENCOUNTER — OFFICE VISIT (OUTPATIENT)
Dept: ORTHOPEDICS | Facility: CLINIC | Age: 46
End: 2021-10-08
Payer: MEDICAID

## 2021-10-08 VITALS
HEART RATE: 102 BPM | WEIGHT: 288.94 LBS | DIASTOLIC BLOOD PRESSURE: 86 MMHG | HEIGHT: 65 IN | BODY MASS INDEX: 48.14 KG/M2 | SYSTOLIC BLOOD PRESSURE: 138 MMHG

## 2021-10-08 DIAGNOSIS — M17.11 PRIMARY OSTEOARTHRITIS OF RIGHT KNEE: Primary | ICD-10-CM

## 2021-10-08 PROCEDURE — 99499 UNLISTED E&M SERVICE: CPT | Mod: S$PBB,,, | Performed by: ORTHOPAEDIC SURGERY

## 2021-10-08 PROCEDURE — 99999 PR PBB SHADOW E&M-EST. PATIENT-LVL IV: ICD-10-PCS | Mod: PBBFAC,,, | Performed by: ORTHOPAEDIC SURGERY

## 2021-10-08 PROCEDURE — 20610 LARGE JOINT ASPIRATION/INJECTION: R KNEE: ICD-10-PCS | Mod: S$PBB,RT,, | Performed by: ORTHOPAEDIC SURGERY

## 2021-10-08 PROCEDURE — 20610 DRAIN/INJ JOINT/BURSA W/O US: CPT | Mod: PBBFAC,PN | Performed by: ORTHOPAEDIC SURGERY

## 2021-10-08 PROCEDURE — 99999 PR PBB SHADOW E&M-EST. PATIENT-LVL IV: CPT | Mod: PBBFAC,,, | Performed by: ORTHOPAEDIC SURGERY

## 2021-10-08 PROCEDURE — 99214 OFFICE O/P EST MOD 30 MIN: CPT | Mod: PBBFAC,PN,25 | Performed by: ORTHOPAEDIC SURGERY

## 2021-10-08 PROCEDURE — 99499 NO LOS: ICD-10-PCS | Mod: S$PBB,,, | Performed by: ORTHOPAEDIC SURGERY

## 2021-10-08 RX ADMIN — Medication 20 MG: at 08:10

## 2021-10-15 ENCOUNTER — OFFICE VISIT (OUTPATIENT)
Dept: ORTHOPEDICS | Facility: CLINIC | Age: 46
End: 2021-10-15
Payer: MEDICAID

## 2021-10-15 VITALS
HEART RATE: 104 BPM | DIASTOLIC BLOOD PRESSURE: 90 MMHG | SYSTOLIC BLOOD PRESSURE: 137 MMHG | HEIGHT: 65 IN | WEIGHT: 280.19 LBS | BODY MASS INDEX: 46.68 KG/M2

## 2021-10-15 DIAGNOSIS — M17.11 PRIMARY OSTEOARTHRITIS OF RIGHT KNEE: Primary | ICD-10-CM

## 2021-10-15 PROCEDURE — 99214 OFFICE O/P EST MOD 30 MIN: CPT | Mod: PBBFAC,PN,25 | Performed by: ORTHOPAEDIC SURGERY

## 2021-10-15 PROCEDURE — 99499 NO LOS: ICD-10-PCS | Mod: S$PBB,,, | Performed by: ORTHOPAEDIC SURGERY

## 2021-10-15 PROCEDURE — 99999 PR PBB SHADOW E&M-EST. PATIENT-LVL IV: ICD-10-PCS | Mod: PBBFAC,,, | Performed by: ORTHOPAEDIC SURGERY

## 2021-10-15 PROCEDURE — 99499 UNLISTED E&M SERVICE: CPT | Mod: S$PBB,,, | Performed by: ORTHOPAEDIC SURGERY

## 2021-10-15 PROCEDURE — 99999 PR PBB SHADOW E&M-EST. PATIENT-LVL IV: CPT | Mod: PBBFAC,,, | Performed by: ORTHOPAEDIC SURGERY

## 2021-10-15 PROCEDURE — 20610 DRAIN/INJ JOINT/BURSA W/O US: CPT | Mod: PBBFAC,PN,RT | Performed by: ORTHOPAEDIC SURGERY

## 2021-10-15 PROCEDURE — 20610 LARGE JOINT ASPIRATION/INJECTION: R KNEE: ICD-10-PCS | Mod: S$PBB,RT,, | Performed by: ORTHOPAEDIC SURGERY

## 2021-10-15 RX ADMIN — Medication 20 MG: at 08:10

## 2022-02-12 ENCOUNTER — HOSPITAL ENCOUNTER (OUTPATIENT)
Dept: RADIOLOGY | Facility: HOSPITAL | Age: 47
Discharge: HOME | End: 2022-02-12
Attending: OBSTETRICS & GYNECOLOGY
Payer: MEDICAID

## 2022-02-12 VITALS — BODY MASS INDEX: 46.65 KG/M2 | WEIGHT: 280 LBS | HEIGHT: 65 IN

## 2022-02-12 DIAGNOSIS — Z12.31 BREAST CANCER SCREENING BY MAMMOGRAM: ICD-10-CM

## 2022-02-12 PROCEDURE — 77063 MAMMO DIGITAL SCREENING BILAT WITH TOMO: ICD-10-PCS | Mod: 26,,, | Performed by: RADIOLOGY

## 2022-02-12 PROCEDURE — 77067 MAMMO DIGITAL SCREENING BILAT WITH TOMO: ICD-10-PCS | Mod: 26,,, | Performed by: RADIOLOGY

## 2022-02-12 PROCEDURE — 77067 SCR MAMMO BI INCL CAD: CPT | Mod: 26,,, | Performed by: RADIOLOGY

## 2022-02-12 PROCEDURE — 77067 SCR MAMMO BI INCL CAD: CPT | Mod: TC

## 2022-02-12 PROCEDURE — 77063 BREAST TOMOSYNTHESIS BI: CPT | Mod: 26,,, | Performed by: RADIOLOGY

## 2022-03-02 DIAGNOSIS — M25.512 LEFT SHOULDER PAIN, UNSPECIFIED CHRONICITY: Primary | ICD-10-CM

## 2022-03-22 ENCOUNTER — HOSPITAL ENCOUNTER (OUTPATIENT)
Dept: RADIOLOGY | Facility: HOSPITAL | Age: 47
Discharge: HOME | End: 2022-03-22
Attending: ORTHOPAEDIC SURGERY
Payer: MEDICAID

## 2022-03-22 ENCOUNTER — OFFICE VISIT (OUTPATIENT)
Dept: ORTHOPEDICS | Facility: CLINIC | Age: 47
End: 2022-03-22
Payer: MEDICAID

## 2022-03-22 VITALS
WEIGHT: 292.69 LBS | BODY MASS INDEX: 48.76 KG/M2 | DIASTOLIC BLOOD PRESSURE: 86 MMHG | HEIGHT: 65 IN | HEART RATE: 80 BPM | SYSTOLIC BLOOD PRESSURE: 132 MMHG

## 2022-03-22 DIAGNOSIS — M19.012 OSTEOARTHRITIS OF GLENOHUMERAL JOINT, LEFT: Primary | ICD-10-CM

## 2022-03-22 DIAGNOSIS — M25.512 LEFT SHOULDER PAIN, UNSPECIFIED CHRONICITY: ICD-10-CM

## 2022-03-22 DIAGNOSIS — E66.01 MORBID OBESITY WITH BMI OF 45.0-49.9, ADULT: ICD-10-CM

## 2022-03-22 PROCEDURE — 99214 OFFICE O/P EST MOD 30 MIN: CPT | Mod: 25,S$PBB,, | Performed by: ORTHOPAEDIC SURGERY

## 2022-03-22 PROCEDURE — 3075F SYST BP GE 130 - 139MM HG: CPT | Mod: CPTII,,, | Performed by: ORTHOPAEDIC SURGERY

## 2022-03-22 PROCEDURE — 99214 PR OFFICE/OUTPT VISIT, EST, LEVL IV, 30-39 MIN: ICD-10-PCS | Mod: 25,S$PBB,, | Performed by: ORTHOPAEDIC SURGERY

## 2022-03-22 PROCEDURE — 73030 XR SHOULDER COMPLETE 2 OR MORE VIEWS LEFT: ICD-10-PCS | Mod: 26,LT,, | Performed by: RADIOLOGY

## 2022-03-22 PROCEDURE — 20610 DRAIN/INJ JOINT/BURSA W/O US: CPT | Mod: PBBFAC,PN | Performed by: ORTHOPAEDIC SURGERY

## 2022-03-22 PROCEDURE — 1159F PR MEDICATION LIST DOCUMENTED IN MEDICAL RECORD: ICD-10-PCS | Mod: CPTII,,, | Performed by: ORTHOPAEDIC SURGERY

## 2022-03-22 PROCEDURE — 3008F BODY MASS INDEX DOCD: CPT | Mod: CPTII,,, | Performed by: ORTHOPAEDIC SURGERY

## 2022-03-22 PROCEDURE — 99999 PR PBB SHADOW E&M-EST. PATIENT-LVL IV: CPT | Mod: PBBFAC,,, | Performed by: ORTHOPAEDIC SURGERY

## 2022-03-22 PROCEDURE — 1160F RVW MEDS BY RX/DR IN RCRD: CPT | Mod: CPTII,,, | Performed by: ORTHOPAEDIC SURGERY

## 2022-03-22 PROCEDURE — 73030 X-RAY EXAM OF SHOULDER: CPT | Mod: 26,LT,, | Performed by: RADIOLOGY

## 2022-03-22 PROCEDURE — 3008F PR BODY MASS INDEX (BMI) DOCUMENTED: ICD-10-PCS | Mod: CPTII,,, | Performed by: ORTHOPAEDIC SURGERY

## 2022-03-22 PROCEDURE — 99214 OFFICE O/P EST MOD 30 MIN: CPT | Mod: PBBFAC,PN | Performed by: ORTHOPAEDIC SURGERY

## 2022-03-22 PROCEDURE — 3075F PR MOST RECENT SYSTOLIC BLOOD PRESS GE 130-139MM HG: ICD-10-PCS | Mod: CPTII,,, | Performed by: ORTHOPAEDIC SURGERY

## 2022-03-22 PROCEDURE — 1159F MED LIST DOCD IN RCRD: CPT | Mod: CPTII,,, | Performed by: ORTHOPAEDIC SURGERY

## 2022-03-22 PROCEDURE — 3079F DIAST BP 80-89 MM HG: CPT | Mod: CPTII,,, | Performed by: ORTHOPAEDIC SURGERY

## 2022-03-22 PROCEDURE — 73030 X-RAY EXAM OF SHOULDER: CPT | Mod: TC,FY,LT

## 2022-03-22 PROCEDURE — 20610 LARGE JOINT ASPIRATION/INJECTION: L GLENOHUMERAL: ICD-10-PCS | Mod: S$PBB,LT,, | Performed by: ORTHOPAEDIC SURGERY

## 2022-03-22 PROCEDURE — 99999 PR PBB SHADOW E&M-EST. PATIENT-LVL IV: ICD-10-PCS | Mod: PBBFAC,,, | Performed by: ORTHOPAEDIC SURGERY

## 2022-03-22 PROCEDURE — 3079F PR MOST RECENT DIASTOLIC BLOOD PRESSURE 80-89 MM HG: ICD-10-PCS | Mod: CPTII,,, | Performed by: ORTHOPAEDIC SURGERY

## 2022-03-22 PROCEDURE — 1160F PR REVIEW ALL MEDS BY PRESCRIBER/CLIN PHARMACIST DOCUMENTED: ICD-10-PCS | Mod: CPTII,,, | Performed by: ORTHOPAEDIC SURGERY

## 2022-03-22 RX ORDER — ATORVASTATIN CALCIUM 10 MG/1
10 TABLET, FILM COATED ORAL NIGHTLY
COMMUNITY
Start: 2022-03-01

## 2022-03-22 RX ADMIN — TRIAMCINOLONE ACETONIDE 40 MG: 40 INJECTION, SUSPENSION INTRA-ARTICULAR; INTRAMUSCULAR at 10:03

## 2022-03-23 RX ORDER — TRIAMCINOLONE ACETONIDE 40 MG/ML
40 INJECTION, SUSPENSION INTRA-ARTICULAR; INTRAMUSCULAR
Status: DISCONTINUED | OUTPATIENT
Start: 2022-03-22 | End: 2022-03-23 | Stop reason: HOSPADM

## 2022-03-23 NOTE — PROCEDURES
Large Joint Aspiration/Injection: L glenohumeral    Date/Time: 3/22/2022 10:45 AM  Performed by: Ariel Valladares MD  Authorized by: Ariel Valladares MD     Consent Done?:  Yes (Verbal)  Indications:  Pain and arthritis  Site marked: the procedure site was marked    Timeout: prior to procedure the correct patient, procedure, and site was verified    Prep: patient was prepped and draped in usual sterile fashion      Local anesthesia used?: Yes    Local anesthetic:  Topical anesthetic and lidocaine 1% without epinephrine  Anesthetic total (ml):  4      Details:  Needle Size:  22 G  Ultrasonic Guidance for needle placement?: No    Approach:  Posterior  Location:  Shoulder  Site:  L glenohumeral  Medications:  40 mg triamcinolone acetonide 40 mg/mL  Patient tolerance:  Patient tolerated the procedure well with no immediate complications

## 2022-03-23 NOTE — PROGRESS NOTES
Patient ID:   Rosaura Mitchell is a 47 y.o. female.    Chief Complaint:   Follow-up evaluation for left shoulder glenohumeral arthritis    HPI:   The patient is returning today for evaluation of the left shoulder.  She reports 10/10 pain.  She did previously undergo a cam procedure which really did not provide her with a whole lot of pain relief.  She is only 47 years old so we tried to delay any sort of arthroplasty type procedures.    Medications:    Current Outpatient Medications:     ACCU-CHEK FASTCLIX Misc, , Disp: , Rfl:     albuterol (PROVENTIL/VENTOLIN HFA) 90 mcg/actuation inhaler, INHALE 2 PUFFS FOUR TIMES DAILY AS NEEDED FOR SHORTNESS OF BREATH, Disp: , Rfl:     amitriptyline (ELAVIL) 50 MG tablet, Take 1 tablet by mouth., Disp: , Rfl:     atorvastatin (LIPITOR) 10 MG tablet, Take 10 mg by mouth nightly., Disp: , Rfl:     busPIRone (BUSPAR) 15 MG tablet, Take 15 mg by mouth 2 (two) times daily., Disp: , Rfl:     butalbital-acetaminophen-caffeine -40 mg (FIORICET, ESGIC) -40 mg per tablet, , Disp: , Rfl:     cholecalciferol, vitamin D3, 125 mcg (5,000 unit) Tab, Take 5,000 Units by mouth once daily., Disp: , Rfl:     citalopram (CELEXA) 20 MG tablet, Take 20 mg by mouth., Disp: , Rfl:     citalopram (CELEXA) 40 MG tablet, Take 40 mg by mouth., Disp: , Rfl:     diclofenac (VOLTAREN) 75 MG EC tablet, Take 1 tablet (75 mg total) by mouth 2 (two) times daily., Disp: 60 tablet, Rfl: 0    doxepin (SINEQUAN) 10 MG capsule, TAKE 1 CAPSULE BY MOUTH AT BEDTIME AS NEEDED FOR SLEEP OR ITCHINESS, Disp: , Rfl:     enoxaparin (LOVENOX) 120 mg/0.8 mL Syrg, , Disp: , Rfl:     famotidine (PEPCID) 20 MG tablet, Take by mouth., Disp: , Rfl:     gabapentin (NEURONTIN) 400 MG capsule, Take 600 mg by mouth 3 (three) times daily. , Disp: , Rfl:     hydrOXYzine (ATARAX) 50 MG tablet, Take 50 mg by mouth., Disp: , Rfl:     lisinopril-hydrochlorothiazide (PRINZIDE,ZESTORETIC) 20-12.5 mg per tablet, Take 1  tablet by mouth once daily., Disp: , Rfl:     losartan-hydrochlorothiazide 50-12.5 mg (HYZAAR) 50-12.5 mg per tablet, Take 1 tablet by mouth., Disp: , Rfl:     metFORMIN (GLUCOPHAGE-XR) 750 MG 24 hr tablet, Take 750 mg by mouth daily with breakfast., Disp: , Rfl:     ONETOUCH ULTRA BLUE TEST STRIP Strp, , Disp: , Rfl:     ONETOUCH ULTRA2 kit, , Disp: , Rfl:     oxyCODONE-acetaminophen (PERCOCET) 5-325 mg per tablet, Take 1 tablet by mouth every 4 (four) hours as needed for Pain., Disp: 20 tablet, Rfl: 0    pantoprazole (PROTONIX) 40 MG tablet, Take 40 mg by mouth., Disp: , Rfl:     topiramate (TOPAMAX) 100 MG tablet, Take 100 mg by mouth 2 (two) times daily., Disp: , Rfl:     XARELTO 20 mg Tab, , Disp: , Rfl:   No current facility-administered medications for this visit.    Facility-Administered Medications Ordered in Other Visits:     cefazolin (ANCEF) 2 gram in dextrose 5% 50 mL IVPB (premix), 2 g, Intravenous, 30 Min Pre-Op, Ariel Valladares MD, 2 g at 01/16/20 1613    Allergies:  Review of patient's allergies indicates:   Allergen Reactions    Naproxen      Hair falls out       Past Medical History:  Past Medical History:   Diagnosis Date    Diabetes mellitus     Hodgkin lymphoma     dx- Aug 15, 2018, chemo 9/25/18-2/26/2019, due to start radiation    Hyperlipidemia     Hypertension     Pulmonary embolism 06/26/2018    Pulmonary embolism     dx June 26, 2018, on lovenox        Past Surgical History:  Past Surgical History:   Procedure Laterality Date    ARTHROSCOPIC DEBRIDEMENT OF SHOULDER Left 1/16/2020    Procedure: DEBRIDEMENT, SHOULDER, ARTHROSCOPIC EXTENSIVE ARTHROSCOPIC RELEASE CAPSULAR RELEASE;  Surgeon: Ariel Valladares MD;  Location: [facility] OR;  Service: Orthopedics;  Laterality: Left;  LEFT SHOULDER EXTENSIVE ARTHROSCOPIC DEBRIDEMENT   GETA + INTERSCALENE  VIDEO/confirmed 1/15/2020 1230 KB Christofer    BREAST BIOPSY Right     stereo rt 4/2018    CHOLECYSTECTOMY  12/1998    UMBILICAL  "HERNIA REPAIR  05/2001    mesh placed       Social History:  Social History     Occupational History    Not on file   Tobacco Use    Smoking status: Never Smoker    Smokeless tobacco: Never Used   Substance and Sexual Activity    Alcohol use: Yes     Comment: Socially    Drug use: No    Sexual activity: Not Currently     Birth control/protection: Abstinence       Family History:  Family History   Problem Relation Age of Onset    Breast cancer Mother     Breast cancer Maternal Cousin     Breast cancer Maternal Aunt     Colon cancer Neg Hx     Ovarian cancer Neg Hx         ROS:  Review of Systems   Musculoskeletal: Positive for arthritis, joint pain and myalgias.   All other systems reviewed and are negative.      Vitals:  /86 (BP Location: Left arm, Patient Position: Sitting, BP Method: Large (Automatic))   Pulse 80   Ht 5' 5" (1.651 m)   Wt 132.7 kg (292 lb 10.6 oz)   BMI 48.70 kg/m²     Physical Examination:  Comprehensive Orthopaedic Musculoskeletal Exam    General        Constitutional: appears stated age, severely obese, well-developed and well-nourished    Scleral icterus: no    Labored breathing: no    Psychiatric: normal mood and affect and no acute distress    Neurological: alert and oriented x3    Skin: intact    Lymphadenopathy: none     Ortho Exam   Left shoulder exam:  No visible atrophy or deformity.  Range of motion is limited.  Elevation to 120, external rotation 10. Rotator cuff strength is well maintained.      Imaging:  I have ordered and independently reviewed the following imaging studies performed at [facility] today    X-Ray Shoulder 2 or More Views Left  Narrative: EXAMINATION:  XR SHOULDER COMPLETE 2 OR MORE VIEWS LEFT    CLINICAL HISTORY:  Pain in left shoulder    TECHNIQUE:  Two or three views of the left shoulder were performed.    COMPARISON:  None available    FINDINGS:  Left glenohumeral and AC joint articulations appear intact with DJD.  No acute fracture, " dislocation, or osseous destruction.  Impression: As above.    Electronically signed by: Dougie Pickard  Date:    03/22/2022  Time:    13:48    Assessment:  1. Osteoarthritis of glenohumeral joint, left    2. Morbid obesity with BMI of 45.0-49.9, adult      Plan:  The patient has osteoarthritis of the left glenohumeral joint.  In my opinion, the patient is too obese and too young to undergo arthroplasty.  Corticosteroid injection was recommended.  She will undergo that today.     No follow-ups on file.

## 2022-09-26 ENCOUNTER — OFFICE VISIT (OUTPATIENT)
Dept: OBSTETRICS AND GYNECOLOGY | Facility: CLINIC | Age: 47
End: 2022-09-26
Payer: MEDICAID

## 2022-09-26 ENCOUNTER — TELEPHONE (OUTPATIENT)
Dept: ORTHOPEDICS | Facility: CLINIC | Age: 47
End: 2022-09-26
Payer: MEDICAID

## 2022-09-26 VITALS — SYSTOLIC BLOOD PRESSURE: 128 MMHG | BODY MASS INDEX: 49.79 KG/M2 | DIASTOLIC BLOOD PRESSURE: 86 MMHG | WEIGHT: 293 LBS

## 2022-09-26 DIAGNOSIS — Z12.4 SCREENING FOR CERVICAL CANCER: ICD-10-CM

## 2022-09-26 DIAGNOSIS — Z01.419 WELL WOMAN EXAM WITH ROUTINE GYNECOLOGICAL EXAM: Primary | ICD-10-CM

## 2022-09-26 DIAGNOSIS — Z12.31 ENCOUNTER FOR SCREENING MAMMOGRAM FOR BREAST CANCER: ICD-10-CM

## 2022-09-26 PROCEDURE — 3008F PR BODY MASS INDEX (BMI) DOCUMENTED: ICD-10-PCS | Mod: CPTII,,, | Performed by: OBSTETRICS & GYNECOLOGY

## 2022-09-26 PROCEDURE — 1160F PR REVIEW ALL MEDS BY PRESCRIBER/CLIN PHARMACIST DOCUMENTED: ICD-10-PCS | Mod: CPTII,,, | Performed by: OBSTETRICS & GYNECOLOGY

## 2022-09-26 PROCEDURE — 99396 PR PREVENTIVE VISIT,EST,40-64: ICD-10-PCS | Mod: S$PBB,,, | Performed by: OBSTETRICS & GYNECOLOGY

## 2022-09-26 PROCEDURE — 99999 PR PBB SHADOW E&M-EST. PATIENT-LVL IV: CPT | Mod: PBBFAC,,, | Performed by: OBSTETRICS & GYNECOLOGY

## 2022-09-26 PROCEDURE — 99396 PREV VISIT EST AGE 40-64: CPT | Mod: S$PBB,,, | Performed by: OBSTETRICS & GYNECOLOGY

## 2022-09-26 PROCEDURE — 88175 CYTOPATH C/V AUTO FLUID REDO: CPT | Performed by: OBSTETRICS & GYNECOLOGY

## 2022-09-26 PROCEDURE — 99214 OFFICE O/P EST MOD 30 MIN: CPT | Mod: PBBFAC,PO | Performed by: OBSTETRICS & GYNECOLOGY

## 2022-09-26 PROCEDURE — 3079F DIAST BP 80-89 MM HG: CPT | Mod: CPTII,,, | Performed by: OBSTETRICS & GYNECOLOGY

## 2022-09-26 PROCEDURE — 1159F PR MEDICATION LIST DOCUMENTED IN MEDICAL RECORD: ICD-10-PCS | Mod: CPTII,,, | Performed by: OBSTETRICS & GYNECOLOGY

## 2022-09-26 PROCEDURE — 1160F RVW MEDS BY RX/DR IN RCRD: CPT | Mod: CPTII,,, | Performed by: OBSTETRICS & GYNECOLOGY

## 2022-09-26 PROCEDURE — 99999 PR PBB SHADOW E&M-EST. PATIENT-LVL IV: ICD-10-PCS | Mod: PBBFAC,,, | Performed by: OBSTETRICS & GYNECOLOGY

## 2022-09-26 PROCEDURE — 1159F MED LIST DOCD IN RCRD: CPT | Mod: CPTII,,, | Performed by: OBSTETRICS & GYNECOLOGY

## 2022-09-26 PROCEDURE — 3074F SYST BP LT 130 MM HG: CPT | Mod: CPTII,,, | Performed by: OBSTETRICS & GYNECOLOGY

## 2022-09-26 PROCEDURE — 3008F BODY MASS INDEX DOCD: CPT | Mod: CPTII,,, | Performed by: OBSTETRICS & GYNECOLOGY

## 2022-09-26 PROCEDURE — 3079F PR MOST RECENT DIASTOLIC BLOOD PRESSURE 80-89 MM HG: ICD-10-PCS | Mod: CPTII,,, | Performed by: OBSTETRICS & GYNECOLOGY

## 2022-09-26 PROCEDURE — 3074F PR MOST RECENT SYSTOLIC BLOOD PRESSURE < 130 MM HG: ICD-10-PCS | Mod: CPTII,,, | Performed by: OBSTETRICS & GYNECOLOGY

## 2022-09-26 RX ORDER — FLUTICASONE PROPIONATE 50 MCG
2 SPRAY, SUSPENSION (ML) NASAL DAILY PRN
COMMUNITY
Start: 2022-08-16 | End: 2023-09-20

## 2022-09-26 RX ORDER — GABAPENTIN 600 MG/1
600 TABLET ORAL 3 TIMES DAILY
COMMUNITY
Start: 2022-08-10 | End: 2023-09-20

## 2022-09-26 RX ORDER — CLONAZEPAM 0.5 MG/1
TABLET ORAL
COMMUNITY
End: 2023-10-03

## 2022-09-26 RX ORDER — LINACLOTIDE 290 UG/1
290 CAPSULE, GELATIN COATED ORAL EVERY MORNING
COMMUNITY
Start: 2022-09-18

## 2022-09-26 RX ORDER — LIDOCAINE HYDROCHLORIDE 20 MG/ML
10 SOLUTION ORAL; TOPICAL EVERY 6 HOURS PRN
COMMUNITY
Start: 2022-08-16 | End: 2023-10-03

## 2022-09-26 RX ORDER — NATEGLINIDE 60 MG/1
60 TABLET ORAL 3 TIMES DAILY
COMMUNITY
Start: 2022-09-25 | End: 2023-09-20

## 2022-09-26 RX ORDER — TIZANIDINE 4 MG/1
4 TABLET ORAL NIGHTLY PRN
COMMUNITY
Start: 2022-09-19 | End: 2023-09-20

## 2022-09-26 RX ORDER — METFORMIN HYDROCHLORIDE 500 MG/1
1000 TABLET, EXTENDED RELEASE ORAL DAILY
COMMUNITY
Start: 2022-07-29 | End: 2023-09-20

## 2022-09-26 RX ORDER — COVID-19 MOLECULAR TEST ASSAY
KIT MISCELLANEOUS
COMMUNITY
Start: 2022-07-15

## 2022-09-26 RX ORDER — PIOGLITAZONEHYDROCHLORIDE 15 MG/1
15 TABLET ORAL DAILY
COMMUNITY
Start: 2022-07-11 | End: 2023-09-20

## 2022-09-26 RX ORDER — LIDOCAINE 36 MG/1
1 PATCH TOPICAL DAILY PRN
COMMUNITY
Start: 2022-08-11

## 2022-09-26 NOTE — PROGRESS NOTES
GYNECOLOGY OFFICE NOTE    Reason for visit: annual    HPI: Pt is a 47 y.o.  female  who presents for annual. Menarche: 12. No cycles since starting chemo in 2018. Denies hx of vaginal bleeding/pelvic pain. She is not sexually active. She does not desire STI screening. She denies vaginal discharge.  Last pap: , reports hx of abnormal with normal f/u since. Last MMG 2022- negative.     Past Medical History:   Diagnosis Date    Diabetes mellitus     Hodgkin lymphoma     dx- Aug 15, 2018, chemo , due to start radiation    Hyperlipidemia     Hypertension     Pulmonary embolism     dx 2018, on lovenox       Past Surgical History:   Procedure Laterality Date    ARTHROSCOPIC DEBRIDEMENT OF SHOULDER Left 2020    Procedure: DEBRIDEMENT, SHOULDER, ARTHROSCOPIC EXTENSIVE ARTHROSCOPIC RELEASE CAPSULAR RELEASE;  Surgeon: Ariel Valladares MD;  Location: [facility];  Service: Orthopedics;  Laterality: Left;  LEFT SHOULDER EXTENSIVE ARTHROSCOPIC DEBRIDEMENT   GETA + INTERSCALENE  VIDEO/confirmed 1/15/2020 1230 KB Christofer    BREAST BIOPSY Right     stereo rt 2018    CHOLECYSTECTOMY  1998    UMBILICAL HERNIA REPAIR  2001    mesh placed       Family History   Problem Relation Age of Onset    Breast cancer Mother     Breast cancer Maternal Cousin     Breast cancer Maternal Aunt     Colon cancer Neg Hx     Ovarian cancer Neg Hx        Social History     Tobacco Use    Smoking status: Never    Smokeless tobacco: Never   Substance Use Topics    Alcohol use: Yes     Comment: Socially    Drug use: No       OB History    Para Term  AB Living   2 2 0     2   SAB IAB Ectopic Multiple Live Births           2      # Outcome Date GA Lbr Aurelio/2nd Weight Sex Delivery Anes PTL Lv   2 Para     F Vag-Spont   VICKIE   1 Para     M Vag-Spont   VICKIE       Current Outpatient Medications   Medication Sig    ACCU-CHEK FASTCLIX Misc     albuterol (PROVENTIL/VENTOLIN HFA) 90 mcg/actuation inhaler  INHALE 2 PUFFS FOUR TIMES DAILY AS NEEDED FOR SHORTNESS OF BREATH    amitriptyline (ELAVIL) 50 MG tablet Take 1 tablet by mouth.    atorvastatin (LIPITOR) 10 MG tablet Take 10 mg by mouth nightly.    BINAXNOW COVID-19 AG SELF TEST Kit TEST AS DIRECTED TODAY    busPIRone (BUSPAR) 15 MG tablet Take 15 mg by mouth 2 (two) times daily.    butalbital-acetaminophen-caffeine -40 mg (FIORICET, ESGIC) -40 mg per tablet     cholecalciferol, vitamin D3, 125 mcg (5,000 unit) Tab Take 5,000 Units by mouth once daily.    citalopram (CELEXA) 20 MG tablet Take 20 mg by mouth.    citalopram (CELEXA) 40 MG tablet Take 40 mg by mouth.    clonazePAM (KLONOPIN) 0.5 MG tablet clonazepam 0.5 mg tablet    doxepin (SINEQUAN) 10 MG capsule TAKE 1 CAPSULE BY MOUTH AT BEDTIME AS NEEDED FOR SLEEP OR ITCHINESS    enoxaparin (LOVENOX) 120 mg/0.8 mL Syrg     famotidine (PEPCID) 20 MG tablet Take by mouth.    fluticasone propionate (FLONASE) 50 mcg/actuation nasal spray 2 sprays by Each Nostril route daily as needed.    gabapentin (NEURONTIN) 600 MG tablet Take 600 mg by mouth 3 (three) times daily.    hydrOXYzine (ATARAX) 50 MG tablet Take 50 mg by mouth.    LIDOCAINE VISCOUS 2 % solution Take 10 mLs by mouth every 6 (six) hours as needed.    LINZESS 290 mcg Cap capsule Take 290 mcg by mouth every morning.    losartan-hydrochlorothiazide 50-12.5 mg (HYZAAR) 50-12.5 mg per tablet Take 1 tablet by mouth.    metFORMIN (GLUCOPHAGE-XR) 500 MG ER 24hr tablet Take 1,000 mg by mouth once daily.    nateglinide (STARLIX) 60 MG tablet Take 60 mg by mouth 3 (three) times daily.    ONETOUCH ULTRA BLUE TEST STRIP Strp     ONETOUCH ULTRA2 kit     oxyCODONE-acetaminophen (PERCOCET) 5-325 mg per tablet Take 1 tablet by mouth every 4 (four) hours as needed for Pain.    pantoprazole (PROTONIX) 40 MG tablet Take 40 mg by mouth.    pioglitazone (ACTOS) 15 MG tablet Take 15 mg by mouth once daily.    tiZANidine (ZANAFLEX) 4 MG tablet Take 4 mg by mouth  nightly as needed.    topiramate (TOPAMAX) 100 MG tablet Take 100 mg by mouth 2 (two) times daily.    XARELTO 20 mg Tab     ZTLIDO 1.8 % PtMd Apply 1 patch topically daily as needed.     No current facility-administered medications for this visit.     Facility-Administered Medications Ordered in Other Visits   Medication    cefazolin (ANCEF) 2 gram in dextrose 5% 50 mL IVPB (premix)       Allergies: Naproxen     /86   Wt 135.7 kg (299 lb 3 oz)   BMI 49.79 kg/m²     ROS:  GENERAL: Denies fever or chills.   SKIN: Denies rash or lesions.   HEAD: Denies head injury or headache.   CHEST: Denies chest pain or shortness of breath.   CARDIOVASCULAR: Denies palpitations or chest pain.   ABDOMEN: No constipation, diarrhea, nausea, vomiting or rectal bleeding.   URINARY: No dysuria, hematuria, or burning on urination.  REPRODUCTIVE: See HPI.   BREASTS: see HPI  NEUROLOGIC: Denies syncope or weakness.     Physical Exam:  GENERAL: alert, appears stated age and cooperative  NEUROLOGIC: orientated to person, place and time, normal mood and affect   CHEST: Normal respiratory effort  NECK: normal appearance  SKIN: no acne, hirsutism  BREAST EXAM: breasts appear normal, no suspicious masses, no skin or nipple changes or axillary nodes  ABDOMEN: abdomen is soft without significant tenderness, masses  EXTERNAL GENITALIA:  normal general appearance  URETHRA: normal urethra, normal urethral meatus  VAGINA:  normal mucosa, no  lesions  CERVIX:  Normal  UTERUS:  non tender, exam limited by habitus  ADNEXA: nontender and exam limited by habitus    Diagnosis:  1. Well woman exam with routine gynecological exam    2. Screening for cervical cancer    3. Encounter for screening mammogram for breast cancer        Plan:   1. Annual  2. Pap today  3. MMG ordered 2/2023    Orders Placed This Encounter    Mammo Digital Screening Bilat w/ Rakesh    Liquid-Based Pap Smear, Screening         Tiffanie Rhodes MD  OB/GYN

## 2022-09-26 NOTE — TELEPHONE ENCOUNTER
----- Message from Jyoti Gotti sent at 9/26/2022  2:27 PM CDT -----  Type:  Patient  Call    Who Called:Pt   Would the patient rather a call back or a response via [facility]? Edwin back   Best Call Back Number:[phone]  Additional Information: Pt says she requested an appt for  10/05 and was scheduled for  09/28 and she is not available ... would like to change appt

## 2022-09-30 LAB
FINAL PATHOLOGIC DIAGNOSIS: NORMAL
Lab: NORMAL

## 2022-10-05 ENCOUNTER — OFFICE VISIT (OUTPATIENT)
Dept: ORTHOPEDICS | Facility: CLINIC | Age: 47
End: 2022-10-05
Payer: MEDICAID

## 2022-10-05 VITALS
WEIGHT: 293 LBS | HEART RATE: 120 BPM | SYSTOLIC BLOOD PRESSURE: 141 MMHG | BODY MASS INDEX: 48.82 KG/M2 | DIASTOLIC BLOOD PRESSURE: 84 MMHG | HEIGHT: 65 IN

## 2022-10-05 DIAGNOSIS — E66.01 MORBID OBESITY WITH BMI OF 45.0-49.9, ADULT: ICD-10-CM

## 2022-10-05 DIAGNOSIS — M19.012 OSTEOARTHRITIS OF GLENOHUMERAL JOINT, LEFT: Primary | ICD-10-CM

## 2022-10-05 PROCEDURE — 1159F PR MEDICATION LIST DOCUMENTED IN MEDICAL RECORD: ICD-10-PCS | Mod: CPTII,,, | Performed by: ORTHOPAEDIC SURGERY

## 2022-10-05 PROCEDURE — 3008F BODY MASS INDEX DOCD: CPT | Mod: CPTII,,, | Performed by: ORTHOPAEDIC SURGERY

## 2022-10-05 PROCEDURE — 99214 OFFICE O/P EST MOD 30 MIN: CPT | Mod: PBBFAC,PN | Performed by: ORTHOPAEDIC SURGERY

## 2022-10-05 PROCEDURE — 3077F PR MOST RECENT SYSTOLIC BLOOD PRESSURE >= 140 MM HG: ICD-10-PCS | Mod: CPTII,,, | Performed by: ORTHOPAEDIC SURGERY

## 2022-10-05 PROCEDURE — 1160F PR REVIEW ALL MEDS BY PRESCRIBER/CLIN PHARMACIST DOCUMENTED: ICD-10-PCS | Mod: CPTII,,, | Performed by: ORTHOPAEDIC SURGERY

## 2022-10-05 PROCEDURE — 1159F MED LIST DOCD IN RCRD: CPT | Mod: CPTII,,, | Performed by: ORTHOPAEDIC SURGERY

## 2022-10-05 PROCEDURE — 99213 OFFICE O/P EST LOW 20 MIN: CPT | Mod: S$PBB,,, | Performed by: ORTHOPAEDIC SURGERY

## 2022-10-05 PROCEDURE — 3079F PR MOST RECENT DIASTOLIC BLOOD PRESSURE 80-89 MM HG: ICD-10-PCS | Mod: CPTII,,, | Performed by: ORTHOPAEDIC SURGERY

## 2022-10-05 PROCEDURE — 3077F SYST BP >= 140 MM HG: CPT | Mod: CPTII,,, | Performed by: ORTHOPAEDIC SURGERY

## 2022-10-05 PROCEDURE — 3079F DIAST BP 80-89 MM HG: CPT | Mod: CPTII,,, | Performed by: ORTHOPAEDIC SURGERY

## 2022-10-05 PROCEDURE — 99213 PR OFFICE/OUTPT VISIT, EST, LEVL III, 20-29 MIN: ICD-10-PCS | Mod: S$PBB,,, | Performed by: ORTHOPAEDIC SURGERY

## 2022-10-05 PROCEDURE — 3008F PR BODY MASS INDEX (BMI) DOCUMENTED: ICD-10-PCS | Mod: CPTII,,, | Performed by: ORTHOPAEDIC SURGERY

## 2022-10-05 PROCEDURE — 99999 PR PBB SHADOW E&M-EST. PATIENT-LVL IV: ICD-10-PCS | Mod: PBBFAC,,, | Performed by: ORTHOPAEDIC SURGERY

## 2022-10-05 PROCEDURE — 99999 PR PBB SHADOW E&M-EST. PATIENT-LVL IV: CPT | Mod: PBBFAC,,, | Performed by: ORTHOPAEDIC SURGERY

## 2022-10-05 PROCEDURE — 1160F RVW MEDS BY RX/DR IN RCRD: CPT | Mod: CPTII,,, | Performed by: ORTHOPAEDIC SURGERY

## 2022-10-05 RX ORDER — MELOXICAM 7.5 MG/1
7.5 TABLET ORAL DAILY
Qty: 28 TABLET | Refills: 0 | Status: ON HOLD | OUTPATIENT
Start: 2022-10-05 | End: 2023-09-28 | Stop reason: HOSPADM

## 2022-10-08 NOTE — PROGRESS NOTES
Patient ID:   Rosaura Mitchell is a 47 y.o. female.    Chief Complaint:   Left shoulder pain    HPI:   The patient is returning for evaluation of her left shoulder. She has a history of osteoarthritis of the left shoulder. She previously underwent a CAM procedure which provided limited improvement. She is now also reporting pain in the right shoulder. She believes that the right shoulder has been overused due to the pain in the left shoulder.     Medications:    Current Outpatient Medications:     ACCU-CHEK FASTCLIX Misc, , Disp: , Rfl:     albuterol (PROVENTIL/VENTOLIN HFA) 90 mcg/actuation inhaler, INHALE 2 PUFFS FOUR TIMES DAILY AS NEEDED FOR SHORTNESS OF BREATH, Disp: , Rfl:     amitriptyline (ELAVIL) 50 MG tablet, Take 1 tablet by mouth., Disp: , Rfl:     atorvastatin (LIPITOR) 10 MG tablet, Take 10 mg by mouth nightly., Disp: , Rfl:     BINAXNOW COVID-19 AG SELF TEST Kit, TEST AS DIRECTED TODAY, Disp: , Rfl:     busPIRone (BUSPAR) 15 MG tablet, Take 15 mg by mouth 2 (two) times daily., Disp: , Rfl:     butalbital-acetaminophen-caffeine -40 mg (FIORICET, ESGIC) -40 mg per tablet, , Disp: , Rfl:     cholecalciferol, vitamin D3, 125 mcg (5,000 unit) Tab, Take 5,000 Units by mouth once daily., Disp: , Rfl:     citalopram (CELEXA) 20 MG tablet, Take 20 mg by mouth., Disp: , Rfl:     citalopram (CELEXA) 40 MG tablet, Take 40 mg by mouth., Disp: , Rfl:     clonazePAM (KLONOPIN) 0.5 MG tablet, clonazepam 0.5 mg tablet, Disp: , Rfl:     doxepin (SINEQUAN) 10 MG capsule, TAKE 1 CAPSULE BY MOUTH AT BEDTIME AS NEEDED FOR SLEEP OR ITCHINESS, Disp: , Rfl:     enoxaparin (LOVENOX) 120 mg/0.8 mL Syrg, , Disp: , Rfl:     famotidine (PEPCID) 20 MG tablet, Take by mouth., Disp: , Rfl:     fluticasone propionate (FLONASE) 50 mcg/actuation nasal spray, 2 sprays by Each Nostril route daily as needed., Disp: , Rfl:     gabapentin (NEURONTIN) 600 MG tablet, Take 600 mg by mouth 3 (three) times daily., Disp: , Rfl:      hydrOXYzine (ATARAX) 50 MG tablet, Take 50 mg by mouth., Disp: , Rfl:     LIDOCAINE VISCOUS 2 % solution, Take 10 mLs by mouth every 6 (six) hours as needed., Disp: , Rfl:     LINZESS 290 mcg Cap capsule, Take 290 mcg by mouth every morning., Disp: , Rfl:     losartan-hydrochlorothiazide 50-12.5 mg (HYZAAR) 50-12.5 mg per tablet, Take 1 tablet by mouth., Disp: , Rfl:     metFORMIN (GLUCOPHAGE-XR) 500 MG ER 24hr tablet, Take 1,000 mg by mouth once daily., Disp: , Rfl:     nateglinide (STARLIX) 60 MG tablet, Take 60 mg by mouth 3 (three) times daily., Disp: , Rfl:     ONETOUCH ULTRA BLUE TEST STRIP Strp, , Disp: , Rfl:     ONETOUCH ULTRA2 kit, , Disp: , Rfl:     oxyCODONE-acetaminophen (PERCOCET) 5-325 mg per tablet, Take 1 tablet by mouth every 4 (four) hours as needed for Pain., Disp: 20 tablet, Rfl: 0    pantoprazole (PROTONIX) 40 MG tablet, Take 40 mg by mouth., Disp: , Rfl:     pioglitazone (ACTOS) 15 MG tablet, Take 15 mg by mouth once daily., Disp: , Rfl:     tiZANidine (ZANAFLEX) 4 MG tablet, Take 4 mg by mouth nightly as needed., Disp: , Rfl:     topiramate (TOPAMAX) 100 MG tablet, Take 100 mg by mouth 2 (two) times daily., Disp: , Rfl:     XARELTO 20 mg Tab, , Disp: , Rfl:     ZTLIDO 1.8 % PtMd, Apply 1 patch topically daily as needed., Disp: , Rfl:     meloxicam (MOBIC) 7.5 MG tablet, Take 1 tablet (7.5 mg total) by mouth once daily., Disp: 28 tablet, Rfl: 0  No current facility-administered medications for this visit.    Facility-Administered Medications Ordered in Other Visits:     cefazolin (ANCEF) 2 gram in dextrose 5% 50 mL IVPB (premix), 2 g, Intravenous, 30 Min Pre-Op, Ariel Valladares MD, 2 g at 01/16/20 9623    Allergies:  Review of patient's allergies indicates:   Allergen Reactions    Naproxen      Hair falls out       Past Medical History:  Past Medical History:   Diagnosis Date    Diabetes mellitus     Hodgkin lymphoma     dx- Aug 15, 2018, chemo 9/25/18-2/26/2019, due to start radiation     "Hyperlipidemia     Hypertension     Pulmonary embolism     dx June 26, 2018, on lovenox        Past Surgical History:  Past Surgical History:   Procedure Laterality Date    ARTHROSCOPIC DEBRIDEMENT OF SHOULDER Left 1/16/2020    Procedure: DEBRIDEMENT, SHOULDER, ARTHROSCOPIC EXTENSIVE ARTHROSCOPIC RELEASE CAPSULAR RELEASE;  Surgeon: Ariel Valladares MD;  Location: [facility];  Service: Orthopedics;  Laterality: Left;  LEFT SHOULDER EXTENSIVE ARTHROSCOPIC DEBRIDEMENT   GETA + INTERSCALENE  VIDEO/confirmed 1/15/2020 1230 KB Christofer    BREAST BIOPSY Right     stereo rt 4/2018    CHOLECYSTECTOMY  12/1998    UMBILICAL HERNIA REPAIR  05/2001    mesh placed       Social History:  Social History     Occupational History    Not on file   Tobacco Use    Smoking status: Never    Smokeless tobacco: Never   Substance and Sexual Activity    Alcohol use: Yes     Comment: Socially    Drug use: No    Sexual activity: Not Currently     Birth control/protection: Abstinence       Family History:  Family History   Problem Relation Age of Onset    Breast cancer Mother     Breast cancer Maternal Cousin     Breast cancer Maternal Aunt     Colon cancer Neg Hx     Ovarian cancer Neg Hx         ROS:  Review of Systems   Musculoskeletal:  Positive for arthritis, joint pain, myalgias and stiffness.   Neurological:  Negative for numbness and paresthesias.   All other systems reviewed and are negative.    Vitals:  BP (!) 141/84 (BP Location: Left arm, Patient Position: Sitting, BP Method: Large (Automatic))   Pulse (!) 120   Ht 5' 5" (1.651 m)   Wt 133.8 kg (295 lb 1.4 oz)   BMI 49.10 kg/m²     Physical Examination:  Comprehensive Orthopaedic Musculoskeletal Exam    General      Constitutional: appears stated age, severely obese, well-developed and well-nourished    Scleral icterus: no    Labored breathing: no    Psychiatric: normal mood and affect and no acute distress    Neurological: alert and oriented x3    Skin: intact    Lymphadenopathy: " none   Ortho Exam     Left shoulder exam:  ROM: elevation 120, ER 10  RTC strength 5/5     Assessment:  1. Osteoarthritis of glenohumeral joint, left    2. Morbid obesity with BMI of 45.0-49.9, adult        Plan:  I will prescribe meloxicam to help with her current pain. She is planning to undergo a gastric sleeve surgery for weight reduction. She is a candidate for total shoulder arthroplasty but it would be best to consider after weight loss surgery.     Orders Placed This Encounter    meloxicam (MOBIC) 7.5 MG tablet     No follow-ups on file.

## 2023-07-11 ENCOUNTER — HOSPITAL ENCOUNTER (OUTPATIENT)
Dept: RADIOLOGY | Facility: HOSPITAL | Age: 48
Discharge: HOME | End: 2023-07-11
Attending: OBSTETRICS & GYNECOLOGY
Payer: MEDICAID

## 2023-07-11 VITALS — WEIGHT: 215 LBS | BODY MASS INDEX: 35.82 KG/M2 | HEIGHT: 65 IN

## 2023-07-11 DIAGNOSIS — Z12.31 ENCOUNTER FOR SCREENING MAMMOGRAM FOR BREAST CANCER: ICD-10-CM

## 2023-07-11 PROCEDURE — 77063 BREAST TOMOSYNTHESIS BI: CPT | Mod: 26,,, | Performed by: RADIOLOGY

## 2023-07-11 PROCEDURE — 77067 MAMMO DIGITAL SCREENING BILAT WITH TOMO: ICD-10-PCS | Mod: 26,,, | Performed by: RADIOLOGY

## 2023-07-11 PROCEDURE — 77067 SCR MAMMO BI INCL CAD: CPT | Mod: TC

## 2023-07-11 PROCEDURE — 77063 MAMMO DIGITAL SCREENING BILAT WITH TOMO: ICD-10-PCS | Mod: 26,,, | Performed by: RADIOLOGY

## 2023-07-11 PROCEDURE — 77067 SCR MAMMO BI INCL CAD: CPT | Mod: 26,,, | Performed by: RADIOLOGY

## 2023-08-23 ENCOUNTER — TELEPHONE (OUTPATIENT)
Dept: ORTHOPEDICS | Facility: CLINIC | Age: 48
End: 2023-08-23
Payer: MEDICAID

## 2023-08-23 NOTE — TELEPHONE ENCOUNTER
----- Message from Kevin Marte sent at 8/23/2023 10:13 AM CDT -----  Type:  Sooner Appointment Request    Caller is requesting a sooner appointment.  Caller declined first available appointment listed below.  Caller will not accept being placed on the waitlist and is requesting a message be sent to doctor.  Name of Caller:pt  When is the first available appointment?09/13  Would the patient rather a call back or a response via [facility]? Call   Best Call Back Number: [phone]  Additional Information: Please be advised pt received a msg stating theres a sooner appt

## 2023-08-24 DIAGNOSIS — M25.512 LEFT SHOULDER PAIN, UNSPECIFIED CHRONICITY: Primary | ICD-10-CM

## 2023-08-29 ENCOUNTER — OFFICE VISIT (OUTPATIENT)
Dept: ORTHOPEDICS | Facility: CLINIC | Age: 48
End: 2023-08-29
Payer: MEDICAID

## 2023-08-29 ENCOUNTER — HOSPITAL ENCOUNTER (OUTPATIENT)
Dept: RADIOLOGY | Facility: HOSPITAL | Age: 48
Discharge: HOME | End: 2023-08-29
Attending: ORTHOPAEDIC SURGERY
Payer: MEDICAID

## 2023-08-29 VITALS
DIASTOLIC BLOOD PRESSURE: 89 MMHG | BODY MASS INDEX: 39.41 KG/M2 | HEART RATE: 92 BPM | HEIGHT: 65 IN | WEIGHT: 236.56 LBS | SYSTOLIC BLOOD PRESSURE: 133 MMHG

## 2023-08-29 DIAGNOSIS — M19.012 OSTEOARTHRITIS OF GLENOHUMERAL JOINT, LEFT: ICD-10-CM

## 2023-08-29 DIAGNOSIS — M25.512 LEFT SHOULDER PAIN, UNSPECIFIED CHRONICITY: ICD-10-CM

## 2023-08-29 DIAGNOSIS — G89.29 CHRONIC LEFT SHOULDER PAIN: Primary | ICD-10-CM

## 2023-08-29 DIAGNOSIS — M25.512 CHRONIC LEFT SHOULDER PAIN: Primary | ICD-10-CM

## 2023-08-29 PROCEDURE — 4010F ACE/ARB THERAPY RXD/TAKEN: CPT | Mod: CPTII,,, | Performed by: ORTHOPAEDIC SURGERY

## 2023-08-29 PROCEDURE — 99215 OFFICE O/P EST HI 40 MIN: CPT | Mod: PBBFAC,PN | Performed by: ORTHOPAEDIC SURGERY

## 2023-08-29 PROCEDURE — 73030 X-RAY EXAM OF SHOULDER: CPT | Mod: TC,PN,LT

## 2023-08-29 PROCEDURE — 3008F BODY MASS INDEX DOCD: CPT | Mod: CPTII,,, | Performed by: ORTHOPAEDIC SURGERY

## 2023-08-29 PROCEDURE — 99214 OFFICE O/P EST MOD 30 MIN: CPT | Mod: S$PBB,,, | Performed by: ORTHOPAEDIC SURGERY

## 2023-08-29 PROCEDURE — 73030 XR SHOULDER COMPLETE 2 OR MORE VIEWS LEFT: ICD-10-PCS | Mod: 26,LT,, | Performed by: RADIOLOGY

## 2023-08-29 PROCEDURE — 3075F PR MOST RECENT SYSTOLIC BLOOD PRESS GE 130-139MM HG: ICD-10-PCS | Mod: CPTII,,, | Performed by: ORTHOPAEDIC SURGERY

## 2023-08-29 PROCEDURE — 4010F PR ACE/ARB THEARPY RXD/TAKEN: ICD-10-PCS | Mod: CPTII,,, | Performed by: ORTHOPAEDIC SURGERY

## 2023-08-29 PROCEDURE — 1160F RVW MEDS BY RX/DR IN RCRD: CPT | Mod: CPTII,,, | Performed by: ORTHOPAEDIC SURGERY

## 2023-08-29 PROCEDURE — 3008F PR BODY MASS INDEX (BMI) DOCUMENTED: ICD-10-PCS | Mod: CPTII,,, | Performed by: ORTHOPAEDIC SURGERY

## 2023-08-29 PROCEDURE — 99214 PR OFFICE/OUTPT VISIT, EST, LEVL IV, 30-39 MIN: ICD-10-PCS | Mod: S$PBB,,, | Performed by: ORTHOPAEDIC SURGERY

## 2023-08-29 PROCEDURE — 3075F SYST BP GE 130 - 139MM HG: CPT | Mod: CPTII,,, | Performed by: ORTHOPAEDIC SURGERY

## 2023-08-29 PROCEDURE — 99999 PR PBB SHADOW E&M-EST. PATIENT-LVL V: ICD-10-PCS | Mod: PBBFAC,,, | Performed by: ORTHOPAEDIC SURGERY

## 2023-08-29 PROCEDURE — 1159F PR MEDICATION LIST DOCUMENTED IN MEDICAL RECORD: ICD-10-PCS | Mod: CPTII,,, | Performed by: ORTHOPAEDIC SURGERY

## 2023-08-29 PROCEDURE — 73030 X-RAY EXAM OF SHOULDER: CPT | Mod: 26,LT,, | Performed by: RADIOLOGY

## 2023-08-29 PROCEDURE — 1159F MED LIST DOCD IN RCRD: CPT | Mod: CPTII,,, | Performed by: ORTHOPAEDIC SURGERY

## 2023-08-29 PROCEDURE — 3079F DIAST BP 80-89 MM HG: CPT | Mod: CPTII,,, | Performed by: ORTHOPAEDIC SURGERY

## 2023-08-29 PROCEDURE — 99999 PR PBB SHADOW E&M-EST. PATIENT-LVL V: CPT | Mod: PBBFAC,,, | Performed by: ORTHOPAEDIC SURGERY

## 2023-08-29 PROCEDURE — 3079F PR MOST RECENT DIASTOLIC BLOOD PRESSURE 80-89 MM HG: ICD-10-PCS | Mod: CPTII,,, | Performed by: ORTHOPAEDIC SURGERY

## 2023-08-29 PROCEDURE — 1160F PR REVIEW ALL MEDS BY PRESCRIBER/CLIN PHARMACIST DOCUMENTED: ICD-10-PCS | Mod: CPTII,,, | Performed by: ORTHOPAEDIC SURGERY

## 2023-08-29 RX ORDER — RIMEGEPANT SULFATE 75 MG/75MG
TABLET, ORALLY DISINTEGRATING ORAL
COMMUNITY
Start: 2023-08-22

## 2023-08-29 RX ORDER — LOSARTAN POTASSIUM 50 MG/1
50 TABLET ORAL
COMMUNITY
Start: 2023-07-24

## 2023-08-29 NOTE — PATIENT INSTRUCTIONS
Ariel Valladares MD, [facility]  Orthopedic Surgery & Sports Medicine  , Residency   [facility] Department of Orthopedic Surgery  Orthopedic Surgeon, [place] [facility]  Head Team Physician, [facility] [employer]  [employer]    General Instructions:    Dr. Valladares will provide detailed instructions that may be specific to your surgery. Please know that the following information serves only as a general guideline    Before your surgery, you will receive a phone call from the surgical pre-operative staff regarding preoperative instructions. Listen very carefully and take notes so that you do not forget anything. Basic instructions generally include:    Do not eat or drink anything after midnight the night before surgery (including drinking water, chewing gum, eating hard candy or chewing tobacco). Brushing your teeth is permitted but do not swallow any water. Eating and drinking can cause serious complications and/or cause your surgery to be delayed or cancelled. We ask that you refrain from smoking after midnight the night before your surgery.    Do not drink alcoholic beverages 24 hours before surgery.    If diabetic, do not take your morning medications (either insulin or medication by mouth) the morning of the surgery. Please bring your medication(s) with you to the surgery center.    If you take medications for your blood pressure or heart, please take your medication(s) as directed by the anesthesiologist with just a sip of water.    Please inform the anesthesiologist if you are taking aspirin products or herbal remedies. These products could prolong bleeding time therefore might need to be discontinued prior to your surgery.    Take a shower or bath, but do not apply lotions or powder. This will minimize the chance of infection. Do not wear make up, jewelry, or contact lenses. Remove all nail polish.    Leave all valuables at home. You will be required to remove all  of your clothing before the surgery and wear a patient gown. Wear comfortable clothing and button down shirt (when applicable for shoulder or elbow surgery).    Notify Dr. Valladares if there is any change in your physical condition such as cold, fever, sore throat, rash, nausea, vomiting, or exposure to chicken pox.    A responsible adult must be available to receive instructions about your care and to drive you home from the surgery center after your surgery. If a responsible adult is not available, your surgery will be cancelled. It is strongly recommended that a responsible adult stay with you for 24 hours after surgery.    Understand that if complications arise, a hospital transfer and admission may be necessary following surgery.    Bring any equipment (sling, crutches, walker) with you that may be necessary after surgery.    Pre-Operative Cleansing:  It is recommended that you wash the shoulder with benzoyl peroxide soap daily starting 5 days before surgery. Studies show that this regimen decreases the amount of certain bacteria that naturally reside on the skin and may lessen your risk of infection.     It is recommended that you wash the surgical site with Hibiclens (chlorhexidine gluconate solution) the night before and the morning of surgery. Studies show that this regimen decreases the amount of certain bacteria that naturally reside on the skin and may lessen your risk of infection.

## 2023-08-30 NOTE — PROGRESS NOTES
Patient ID:   Rosaura Mitchell is a 48 y.o. female.    Chief Complaint:   Follow-up evaluation for left glenohumeral osteoarthritis    HPI:   Patient is a 48-year-old female who is returning today for evaluation of her left shoulder.  She reports 10/10 pain.  Past treatment has included NSAIDs, corticosteroid injections, physical therapy, and comprehensive arthroscopic management.  She sustained a fall earlier in the year and states that since then the pain has been much worse.  Pain is worse any time she attempts movement with the left upper extremity.  She also reports a significant amount of night pain.    Medications:    Current Outpatient Medications:     ACCU-CHEK FASTCLIX Misc, , Disp: , Rfl:     albuterol (PROVENTIL/VENTOLIN HFA) 90 mcg/actuation inhaler, INHALE 2 PUFFS FOUR TIMES DAILY AS NEEDED FOR SHORTNESS OF BREATH, Disp: , Rfl:     amitriptyline (ELAVIL) 50 MG tablet, Take 1 tablet by mouth., Disp: , Rfl:     atorvastatin (LIPITOR) 10 MG tablet, Take 10 mg by mouth nightly., Disp: , Rfl:     BINAXNOW COVID-19 AG SELF TEST Kit, TEST AS DIRECTED TODAY, Disp: , Rfl:     busPIRone (BUSPAR) 15 MG tablet, Take 15 mg by mouth 2 (two) times daily., Disp: , Rfl:     butalbital-acetaminophen-caffeine -40 mg (FIORICET, ESGIC) -40 mg per tablet, , Disp: , Rfl:     cholecalciferol, vitamin D3, 125 mcg (5,000 unit) Tab, Take 5,000 Units by mouth once daily., Disp: , Rfl:     citalopram (CELEXA) 20 MG tablet, Take 20 mg by mouth., Disp: , Rfl:     citalopram (CELEXA) 40 MG tablet, Take 40 mg by mouth., Disp: , Rfl:     clonazePAM (KLONOPIN) 0.5 MG tablet, clonazepam 0.5 mg tablet, Disp: , Rfl:     doxepin (SINEQUAN) 10 MG capsule, TAKE 1 CAPSULE BY MOUTH AT BEDTIME AS NEEDED FOR SLEEP OR ITCHINESS, Disp: , Rfl:     enoxaparin (LOVENOX) 120 mg/0.8 mL Syrg, , Disp: , Rfl:     famotidine (PEPCID) 20 MG tablet, Take by mouth., Disp: , Rfl:     fluticasone propionate (FLONASE) 50 mcg/actuation nasal spray, 2  sprays by Each Nostril route daily as needed., Disp: , Rfl:     gabapentin (NEURONTIN) 600 MG tablet, Take 600 mg by mouth 3 (three) times daily., Disp: , Rfl:     hydrOXYzine (ATARAX) 50 MG tablet, Take 50 mg by mouth., Disp: , Rfl:     LIDOCAINE VISCOUS 2 % solution, Take 10 mLs by mouth every 6 (six) hours as needed., Disp: , Rfl:     LINZESS 290 mcg Cap capsule, Take 290 mcg by mouth every morning., Disp: , Rfl:     losartan (COZAAR) 50 MG tablet, Take 50 mg by mouth., Disp: , Rfl:     losartan-hydrochlorothiazide 50-12.5 mg (HYZAAR) 50-12.5 mg per tablet, Take 1 tablet by mouth., Disp: , Rfl:     meloxicam (MOBIC) 7.5 MG tablet, Take 1 tablet (7.5 mg total) by mouth once daily., Disp: 28 tablet, Rfl: 0    metFORMIN (GLUCOPHAGE-XR) 500 MG ER 24hr tablet, Take 1,000 mg by mouth once daily., Disp: , Rfl:     nateglinide (STARLIX) 60 MG tablet, Take 60 mg by mouth 3 (three) times daily., Disp: , Rfl:     NURTEC 75 mg odt, Take by mouth., Disp: , Rfl:     ONETOUCH ULTRA BLUE TEST STRIP Strp, , Disp: , Rfl:     ONETOUCH ULTRA2 kit, , Disp: , Rfl:     oxyCODONE-acetaminophen (PERCOCET) 5-325 mg per tablet, Take 1 tablet by mouth every 4 (four) hours as needed for Pain., Disp: 20 tablet, Rfl: 0    pantoprazole (PROTONIX) 40 MG tablet, Take 40 mg by mouth., Disp: , Rfl:     pioglitazone (ACTOS) 15 MG tablet, Take 15 mg by mouth once daily., Disp: , Rfl:     tiZANidine (ZANAFLEX) 4 MG tablet, Take 4 mg by mouth nightly as needed., Disp: , Rfl:     topiramate (TOPAMAX) 100 MG tablet, Take 100 mg by mouth 2 (two) times daily., Disp: , Rfl:     XARELTO 20 mg Tab, , Disp: , Rfl:     ZTLIDO 1.8 % PtMd, Apply 1 patch topically daily as needed., Disp: , Rfl:   No current facility-administered medications for this visit.    Facility-Administered Medications Ordered in Other Visits:     cefazolin (ANCEF) 2 gram in dextrose 5% 50 mL IVPB (premix), 2 g, Intravenous, 30 Min Pre-Op, Ariel Valladares MD, 2 g at 01/16/20  "1613    Allergies:  Review of patient's allergies indicates:   Allergen Reactions    Naproxen      Hair falls out       Past Medical History:  Past Medical History:   Diagnosis Date    Diabetes mellitus     Hodgkin lymphoma     dx- Aug 15, 2018, chemo 9/25/18-2/26/2019, due to start radiation    Hyperlipidemia     Hypertension     Pulmonary embolism     dx June 26, 2018, on lovenox        Past Surgical History:  Past Surgical History:   Procedure Laterality Date    ARTHROSCOPIC DEBRIDEMENT OF SHOULDER Left 1/16/2020    Procedure: DEBRIDEMENT, SHOULDER, ARTHROSCOPIC EXTENSIVE ARTHROSCOPIC RELEASE CAPSULAR RELEASE;  Surgeon: Ariel Valladares MD;  Location: [facility];  Service: Orthopedics;  Laterality: Left;  LEFT SHOULDER EXTENSIVE ARTHROSCOPIC DEBRIDEMENT   GETA + INTERSCALENE  VIDEO/confirmed 1/15/2020 1230 KB Christofer    BREAST BIOPSY Right     stereo rt 4/2018    CHOLECYSTECTOMY  12/1998    UMBILICAL HERNIA REPAIR  05/2001    mesh placed       Social History:  Social History     Occupational History    Not on file   Tobacco Use    Smoking status: Never    Smokeless tobacco: Never   Substance and Sexual Activity    Alcohol use: Yes     Comment: Socially    Drug use: No    Sexual activity: Not Currently     Birth control/protection: Abstinence       Family History:  Family History   Problem Relation Age of Onset    Breast cancer Mother     Breast cancer Maternal Cousin     Breast cancer Maternal Aunt     Colon cancer Neg Hx     Ovarian cancer Neg Hx         ROS:  Review of Systems   Musculoskeletal:  Positive for arthritis, falls, joint pain, myalgias and stiffness.   All other systems reviewed and are negative.      Vitals:  /89   Pulse 92   Ht 5' 5" (1.651 m)   Wt 107.3 kg (236 lb 8.9 oz)   BMI 39.36 kg/m²     Physical Examination:  Comprehensive Orthopaedic Musculoskeletal Exam    General      Constitutional: appears stated age, moderately obese, well-developed and well-nourished    Scleral icterus: no    " Labored breathing: no    Psychiatric: normal mood and affect and no acute distress    Neurological: alert and oriented x3    Skin: intact    Lymphadenopathy: none     Ortho Exam   Left shoulder exam:   No visible deformity.    Range of motion reveals active elevation to 110, passive elevation 130, external rotation 20, internal rotation L5.    Rotator cuff strength is 5/5 in elevation, external rotation, and internal rotation.  Positive for bony crepitus.    Imaging:  I have ordered and independently reviewed the following imaging studies performed at [facility] today    X-Ray Shoulder 2 or More Views Left  Narrative: EXAMINATION:  XR SHOULDER COMPLETE 2 OR MORE VIEWS LEFT    CLINICAL HISTORY:  Pain in left shoulder    TECHNIQUE:  Two or three views of the left shoulder were performed.    COMPARISON:  Left shoulder radiograph dated 03/22/2022    FINDINGS:  Left glenohumeral articulation is intact with joint space narrowing and DJD.  AC joint is maintained.  No acute fracture, dislocation, or osseous destruction.  Impression: As above.    Electronically signed by: Dougie Pickard  Date:    08/29/2023  Time:    16:50    Assessment:  1. Chronic left shoulder pain    2. Osteoarthritis of glenohumeral joint, left      Plan:  Reviewed the findings with the patient in detail.  She is pretty much exhausted conservative measures up to this point.  I did discuss the option of left total shoulder arthroplasty.  She would like to proceed with this surgical option.  Preoperative CT scan was recommended.  We will find a surgical date for her in the near future.    Orders Placed This Encounter    CT Shoulder Without Contrast Left     No follow-ups on file.

## 2023-09-05 ENCOUNTER — HOSPITAL ENCOUNTER (OUTPATIENT)
Dept: RADIOLOGY | Facility: HOSPITAL | Age: 48
Discharge: HOME | End: 2023-09-05
Attending: ORTHOPAEDIC SURGERY
Payer: MEDICAID

## 2023-09-05 DIAGNOSIS — M25.512 CHRONIC LEFT SHOULDER PAIN: ICD-10-CM

## 2023-09-05 DIAGNOSIS — G89.29 CHRONIC LEFT SHOULDER PAIN: ICD-10-CM

## 2023-09-05 PROCEDURE — 73200 CT SHOULDER WITHOUT CONTRAST LEFT: ICD-10-PCS | Mod: 26,LT,, | Performed by: RADIOLOGY

## 2023-09-05 PROCEDURE — 73200 CT UPPER EXTREMITY W/O DYE: CPT | Mod: 26,LT,, | Performed by: RADIOLOGY

## 2023-09-05 PROCEDURE — 73200 CT UPPER EXTREMITY W/O DYE: CPT | Mod: TC,LT

## 2023-09-07 DIAGNOSIS — M19.012 GLENOHUMERAL ARTHRITIS, LEFT: ICD-10-CM

## 2023-09-07 DIAGNOSIS — M19.012 OSTEOARTHRITIS OF GLENOHUMERAL JOINT, LEFT: Primary | ICD-10-CM

## 2023-09-07 RX ORDER — SODIUM CHLORIDE 9 MG/ML
INJECTION, SOLUTION INTRAVENOUS CONTINUOUS
Status: CANCELLED | OUTPATIENT
Start: 2023-09-07

## 2023-09-07 RX ORDER — MUPIROCIN 20 MG/G
OINTMENT TOPICAL
Status: CANCELLED | OUTPATIENT
Start: 2023-09-07

## 2023-09-08 ENCOUNTER — OFFICE VISIT (OUTPATIENT)
Dept: ORTHOPEDICS | Facility: CLINIC | Age: 48
End: 2023-09-08
Payer: MEDICAID

## 2023-09-08 VITALS
DIASTOLIC BLOOD PRESSURE: 89 MMHG | HEART RATE: 99 BPM | WEIGHT: 236.56 LBS | SYSTOLIC BLOOD PRESSURE: 137 MMHG | HEIGHT: 65 IN | BODY MASS INDEX: 39.41 KG/M2

## 2023-09-08 DIAGNOSIS — M19.012 OSTEOARTHRITIS OF GLENOHUMERAL JOINT, LEFT: Primary | ICD-10-CM

## 2023-09-08 PROCEDURE — 3075F SYST BP GE 130 - 139MM HG: CPT | Mod: CPTII,,, | Performed by: ORTHOPAEDIC SURGERY

## 2023-09-08 PROCEDURE — 99214 OFFICE O/P EST MOD 30 MIN: CPT | Mod: S$PBB,,, | Performed by: ORTHOPAEDIC SURGERY

## 2023-09-08 PROCEDURE — 1159F MED LIST DOCD IN RCRD: CPT | Mod: CPTII,,, | Performed by: ORTHOPAEDIC SURGERY

## 2023-09-08 PROCEDURE — 4010F PR ACE/ARB THEARPY RXD/TAKEN: ICD-10-PCS | Mod: CPTII,,, | Performed by: ORTHOPAEDIC SURGERY

## 2023-09-08 PROCEDURE — 99999 PR PBB SHADOW E&M-EST. PATIENT-LVL V: ICD-10-PCS | Mod: PBBFAC,,, | Performed by: ORTHOPAEDIC SURGERY

## 2023-09-08 PROCEDURE — 3008F PR BODY MASS INDEX (BMI) DOCUMENTED: ICD-10-PCS | Mod: CPTII,,, | Performed by: ORTHOPAEDIC SURGERY

## 2023-09-08 PROCEDURE — 1160F PR REVIEW ALL MEDS BY PRESCRIBER/CLIN PHARMACIST DOCUMENTED: ICD-10-PCS | Mod: CPTII,,, | Performed by: ORTHOPAEDIC SURGERY

## 2023-09-08 PROCEDURE — 3075F PR MOST RECENT SYSTOLIC BLOOD PRESS GE 130-139MM HG: ICD-10-PCS | Mod: CPTII,,, | Performed by: ORTHOPAEDIC SURGERY

## 2023-09-08 PROCEDURE — 99215 OFFICE O/P EST HI 40 MIN: CPT | Mod: PBBFAC,PN | Performed by: ORTHOPAEDIC SURGERY

## 2023-09-08 PROCEDURE — 3008F BODY MASS INDEX DOCD: CPT | Mod: CPTII,,, | Performed by: ORTHOPAEDIC SURGERY

## 2023-09-08 PROCEDURE — 1160F RVW MEDS BY RX/DR IN RCRD: CPT | Mod: CPTII,,, | Performed by: ORTHOPAEDIC SURGERY

## 2023-09-08 PROCEDURE — 99999 PR PBB SHADOW E&M-EST. PATIENT-LVL V: CPT | Mod: PBBFAC,,, | Performed by: ORTHOPAEDIC SURGERY

## 2023-09-08 PROCEDURE — 3079F DIAST BP 80-89 MM HG: CPT | Mod: CPTII,,, | Performed by: ORTHOPAEDIC SURGERY

## 2023-09-08 PROCEDURE — 4010F ACE/ARB THERAPY RXD/TAKEN: CPT | Mod: CPTII,,, | Performed by: ORTHOPAEDIC SURGERY

## 2023-09-08 PROCEDURE — 3079F PR MOST RECENT DIASTOLIC BLOOD PRESSURE 80-89 MM HG: ICD-10-PCS | Mod: CPTII,,, | Performed by: ORTHOPAEDIC SURGERY

## 2023-09-08 PROCEDURE — 1159F PR MEDICATION LIST DOCUMENTED IN MEDICAL RECORD: ICD-10-PCS | Mod: CPTII,,, | Performed by: ORTHOPAEDIC SURGERY

## 2023-09-08 PROCEDURE — 99214 PR OFFICE/OUTPT VISIT, EST, LEVL IV, 30-39 MIN: ICD-10-PCS | Mod: S$PBB,,, | Performed by: ORTHOPAEDIC SURGERY

## 2023-09-08 RX ORDER — ACETAMINOPHEN AND CODEINE PHOSPHATE 300; 30 MG/1; MG/1
1 TABLET ORAL
COMMUNITY
Start: 2023-09-06 | End: 2023-10-02

## 2023-09-08 NOTE — PROGRESS NOTES
Patient ID:   Rosaura Mitchell is a 48 y.o. female.    Chief Complaint:   Follow-up evaluation for left glenohumeral osteoarthritis    HPI:   The patient is returning for evaluation of her left shoulder.  She continues to have severe pain in the left shoulder.  She recently completed a CT scan.  CT scan demonstrates a significant amount of arthritic change.    Medications:    Current Outpatient Medications:     ACCU-CHEK FASTCLIX Misc, , Disp: , Rfl:     acetaminophen-codeine 300-30mg (TYLENOL #3) 300-30 mg Tab, Take 1 tablet by mouth., Disp: , Rfl:     albuterol (PROVENTIL/VENTOLIN HFA) 90 mcg/actuation inhaler, INHALE 2 PUFFS FOUR TIMES DAILY AS NEEDED FOR SHORTNESS OF BREATH, Disp: , Rfl:     amitriptyline (ELAVIL) 50 MG tablet, Take 1 tablet by mouth., Disp: , Rfl:     atorvastatin (LIPITOR) 10 MG tablet, Take 10 mg by mouth nightly., Disp: , Rfl:     BINAXNOW COVID-19 AG SELF TEST Kit, TEST AS DIRECTED TODAY, Disp: , Rfl:     busPIRone (BUSPAR) 15 MG tablet, Take 15 mg by mouth 2 (two) times daily., Disp: , Rfl:     butalbital-acetaminophen-caffeine -40 mg (FIORICET, ESGIC) -40 mg per tablet, , Disp: , Rfl:     cholecalciferol, vitamin D3, 125 mcg (5,000 unit) Tab, Take 5,000 Units by mouth once daily., Disp: , Rfl:     citalopram (CELEXA) 20 MG tablet, Take 20 mg by mouth., Disp: , Rfl:     citalopram (CELEXA) 40 MG tablet, Take 40 mg by mouth., Disp: , Rfl:     clonazePAM (KLONOPIN) 0.5 MG tablet, clonazepam 0.5 mg tablet, Disp: , Rfl:     doxepin (SINEQUAN) 10 MG capsule, TAKE 1 CAPSULE BY MOUTH AT BEDTIME AS NEEDED FOR SLEEP OR ITCHINESS, Disp: , Rfl:     enoxaparin (LOVENOX) 120 mg/0.8 mL Syrg, , Disp: , Rfl:     famotidine (PEPCID) 20 MG tablet, Take by mouth., Disp: , Rfl:     fluticasone propionate (FLONASE) 50 mcg/actuation nasal spray, 2 sprays by Each Nostril route daily as needed., Disp: , Rfl:     gabapentin (NEURONTIN) 600 MG tablet, Take 600 mg by mouth 3 (three) times daily., Disp: ,  Rfl:     hydrOXYzine (ATARAX) 50 MG tablet, Take 50 mg by mouth., Disp: , Rfl:     LIDOCAINE VISCOUS 2 % solution, Take 10 mLs by mouth every 6 (six) hours as needed., Disp: , Rfl:     LINZESS 290 mcg Cap capsule, Take 290 mcg by mouth every morning., Disp: , Rfl:     losartan (COZAAR) 50 MG tablet, Take 50 mg by mouth., Disp: , Rfl:     losartan-hydrochlorothiazide 50-12.5 mg (HYZAAR) 50-12.5 mg per tablet, Take 1 tablet by mouth., Disp: , Rfl:     meloxicam (MOBIC) 7.5 MG tablet, Take 1 tablet (7.5 mg total) by mouth once daily., Disp: 28 tablet, Rfl: 0    metFORMIN (GLUCOPHAGE-XR) 500 MG ER 24hr tablet, Take 1,000 mg by mouth once daily., Disp: , Rfl:     nateglinide (STARLIX) 60 MG tablet, Take 60 mg by mouth 3 (three) times daily., Disp: , Rfl:     NURTEC 75 mg odt, Take by mouth., Disp: , Rfl:     ONETOUCH ULTRA BLUE TEST STRIP Strp, , Disp: , Rfl:     ONETOUCH ULTRA2 kit, , Disp: , Rfl:     oxyCODONE-acetaminophen (PERCOCET) 5-325 mg per tablet, Take 1 tablet by mouth every 4 (four) hours as needed for Pain., Disp: 20 tablet, Rfl: 0    pantoprazole (PROTONIX) 40 MG tablet, Take 40 mg by mouth., Disp: , Rfl:     pioglitazone (ACTOS) 15 MG tablet, Take 15 mg by mouth once daily., Disp: , Rfl:     tiZANidine (ZANAFLEX) 4 MG tablet, Take 4 mg by mouth nightly as needed., Disp: , Rfl:     topiramate (TOPAMAX) 100 MG tablet, Take 100 mg by mouth 2 (two) times daily., Disp: , Rfl:     XARELTO 20 mg Tab, , Disp: , Rfl:     ZTLIDO 1.8 % PtMd, Apply 1 patch topically daily as needed., Disp: , Rfl:   No current facility-administered medications for this visit.    Facility-Administered Medications Ordered in Other Visits:     cefazolin (ANCEF) 2 gram in dextrose 5% 50 mL IVPB (premix), 2 g, Intravenous, 30 Min Pre-Op, Ariel Valladares MD, 2 g at 01/16/20 7338    Allergies:  Review of patient's allergies indicates:   Allergen Reactions    Naproxen      Hair falls out       Past Medical History:  Past Medical History:  "  Diagnosis Date    Diabetes mellitus     Hodgkin lymphoma     dx- Aug 15, 2018, chemo 9/25/18-2/26/2019, due to start radiation    Hyperlipidemia     Hypertension     Pulmonary embolism     dx June 26, 2018, on lovenox        Past Surgical History:  Past Surgical History:   Procedure Laterality Date    ARTHROSCOPIC DEBRIDEMENT OF SHOULDER Left 1/16/2020    Procedure: DEBRIDEMENT, SHOULDER, ARTHROSCOPIC EXTENSIVE ARTHROSCOPIC RELEASE CAPSULAR RELEASE;  Surgeon: Ariel Valladares MD;  Location: [facility];  Service: Orthopedics;  Laterality: Left;  LEFT SHOULDER EXTENSIVE ARTHROSCOPIC DEBRIDEMENT   GETA + INTERSCALENE  VIDEO/confirmed 1/15/2020 1230 KB Christofer    BREAST BIOPSY Right     stereo rt 4/2018    CHOLECYSTECTOMY  12/1998    UMBILICAL HERNIA REPAIR  05/2001    mesh placed       Social History:  Social History     Occupational History    Not on file   Tobacco Use    Smoking status: Never    Smokeless tobacco: Never   Substance and Sexual Activity    Alcohol use: Yes     Comment: Socially    Drug use: No    Sexual activity: Not Currently     Birth control/protection: Abstinence       Family History:  Family History   Problem Relation Age of Onset    Breast cancer Mother     Breast cancer Maternal Cousin     Breast cancer Maternal Aunt     Colon cancer Neg Hx     Ovarian cancer Neg Hx         ROS:  Review of Systems   Musculoskeletal:  Positive for joint pain, myalgias and stiffness.   All other systems reviewed and are negative.      Vitals:  /89   Pulse 99   Ht 5' 5" (1.651 m)   Wt 107.3 kg (236 lb 8.9 oz)   BMI 39.36 kg/m²     Physical Examination:  Comprehensive Orthopaedic Musculoskeletal Exam    General      Constitutional: appears stated age, moderately obese, well-developed and well-nourished    Scleral icterus: no    Labored breathing: no    Psychiatric: normal mood and affect and no acute distress    Neurological: alert and oriented x3    Skin: intact    Lymphadenopathy: none     Ortho Exam "   Left shoulder exam:  No visible deformity.    Range of motion reveals active elevation to 110, passive elevation 130, external rotation 20, internal rotation L5.    Rotator cuff strength is 5/5 in elevation, external rotation, and internal rotation.  Positive for bony crepitus.    Imaging:  I have independently reviewed the following imaging studies performed at [facility]:    EXAMINATION:  CT SHOULDER WITHOUT CONTRAST LEFT     CLINICAL HISTORY:  Shoulder pain, chronic, osteoarthritis suspected;  Pain in left shoulder     TECHNIQUE:  CT left shoulder performed without contrast.  Coronal and sagittal reformats provided.     COMPARISON:  Radiographs 08/29/2023     FINDINGS:  No fracture or dislocation.  No lytic or blastic lesion.     There is severe loss of glenohumeral cartilage space with bulky osteophyte production.  There is remodeling of the glenoid.     Acromioclavicular joint is unremarkable.     Rotator cuff muscle bulk is maintained.     Note made of left-sided port.  Left lung is largely clear.  No left axillary lymphadenopathy.     Impression:     Severe left glenohumeral osteoarthritis.        Electronically signed by: Pan Oakley MD  Date:                                            09/06/2023  Time:                                           08:17    Assessment:  1. Osteoarthritis of glenohumeral joint, left      Plan:  I discussed left total shoulder arthroplasty.  We have establish a surgical date of September 28, 2023.  She is instructed to stop Xarelto 5 days before surgery.  Her primary care physician is provided her with Lovenox to start taking when she stops the Xarelto.  I told her last dose of Lovenox should be the morning of Wednesday September 27, 2023.  Risks, benefits, and alternatives were discussed.     No follow-ups on file.

## 2023-09-08 NOTE — H&P (VIEW-ONLY)
Patient ID:   Rosaura Mitchell is a 48 y.o. female.    Chief Complaint:   Follow-up evaluation for left glenohumeral osteoarthritis    HPI:   The patient is returning for evaluation of her left shoulder.  She continues to have severe pain in the left shoulder.  She recently completed a CT scan.  CT scan demonstrates a significant amount of arthritic change.    Medications:    Current Outpatient Medications:     ACCU-CHEK FASTCLIX Misc, , Disp: , Rfl:     acetaminophen-codeine 300-30mg (TYLENOL #3) 300-30 mg Tab, Take 1 tablet by mouth., Disp: , Rfl:     albuterol (PROVENTIL/VENTOLIN HFA) 90 mcg/actuation inhaler, INHALE 2 PUFFS FOUR TIMES DAILY AS NEEDED FOR SHORTNESS OF BREATH, Disp: , Rfl:     amitriptyline (ELAVIL) 50 MG tablet, Take 1 tablet by mouth., Disp: , Rfl:     atorvastatin (LIPITOR) 10 MG tablet, Take 10 mg by mouth nightly., Disp: , Rfl:     BINAXNOW COVID-19 AG SELF TEST Kit, TEST AS DIRECTED TODAY, Disp: , Rfl:     busPIRone (BUSPAR) 15 MG tablet, Take 15 mg by mouth 2 (two) times daily., Disp: , Rfl:     butalbital-acetaminophen-caffeine -40 mg (FIORICET, ESGIC) -40 mg per tablet, , Disp: , Rfl:     cholecalciferol, vitamin D3, 125 mcg (5,000 unit) Tab, Take 5,000 Units by mouth once daily., Disp: , Rfl:     citalopram (CELEXA) 20 MG tablet, Take 20 mg by mouth., Disp: , Rfl:     citalopram (CELEXA) 40 MG tablet, Take 40 mg by mouth., Disp: , Rfl:     clonazePAM (KLONOPIN) 0.5 MG tablet, clonazepam 0.5 mg tablet, Disp: , Rfl:     doxepin (SINEQUAN) 10 MG capsule, TAKE 1 CAPSULE BY MOUTH AT BEDTIME AS NEEDED FOR SLEEP OR ITCHINESS, Disp: , Rfl:     enoxaparin (LOVENOX) 120 mg/0.8 mL Syrg, , Disp: , Rfl:     famotidine (PEPCID) 20 MG tablet, Take by mouth., Disp: , Rfl:     fluticasone propionate (FLONASE) 50 mcg/actuation nasal spray, 2 sprays by Each Nostril route daily as needed., Disp: , Rfl:     gabapentin (NEURONTIN) 600 MG tablet, Take 600 mg by mouth 3 (three) times daily., Disp: ,  Rfl:     hydrOXYzine (ATARAX) 50 MG tablet, Take 50 mg by mouth., Disp: , Rfl:     LIDOCAINE VISCOUS 2 % solution, Take 10 mLs by mouth every 6 (six) hours as needed., Disp: , Rfl:     LINZESS 290 mcg Cap capsule, Take 290 mcg by mouth every morning., Disp: , Rfl:     losartan (COZAAR) 50 MG tablet, Take 50 mg by mouth., Disp: , Rfl:     losartan-hydrochlorothiazide 50-12.5 mg (HYZAAR) 50-12.5 mg per tablet, Take 1 tablet by mouth., Disp: , Rfl:     meloxicam (MOBIC) 7.5 MG tablet, Take 1 tablet (7.5 mg total) by mouth once daily., Disp: 28 tablet, Rfl: 0    metFORMIN (GLUCOPHAGE-XR) 500 MG ER 24hr tablet, Take 1,000 mg by mouth once daily., Disp: , Rfl:     nateglinide (STARLIX) 60 MG tablet, Take 60 mg by mouth 3 (three) times daily., Disp: , Rfl:     NURTEC 75 mg odt, Take by mouth., Disp: , Rfl:     ONETOUCH ULTRA BLUE TEST STRIP Strp, , Disp: , Rfl:     ONETOUCH ULTRA2 kit, , Disp: , Rfl:     oxyCODONE-acetaminophen (PERCOCET) 5-325 mg per tablet, Take 1 tablet by mouth every 4 (four) hours as needed for Pain., Disp: 20 tablet, Rfl: 0    pantoprazole (PROTONIX) 40 MG tablet, Take 40 mg by mouth., Disp: , Rfl:     pioglitazone (ACTOS) 15 MG tablet, Take 15 mg by mouth once daily., Disp: , Rfl:     tiZANidine (ZANAFLEX) 4 MG tablet, Take 4 mg by mouth nightly as needed., Disp: , Rfl:     topiramate (TOPAMAX) 100 MG tablet, Take 100 mg by mouth 2 (two) times daily., Disp: , Rfl:     XARELTO 20 mg Tab, , Disp: , Rfl:     ZTLIDO 1.8 % PtMd, Apply 1 patch topically daily as needed., Disp: , Rfl:   No current facility-administered medications for this visit.    Facility-Administered Medications Ordered in Other Visits:     cefazolin (ANCEF) 2 gram in dextrose 5% 50 mL IVPB (premix), 2 g, Intravenous, 30 Min Pre-Op, Ariel Valladares MD, 2 g at 01/16/20 1566    Allergies:  Review of patient's allergies indicates:   Allergen Reactions    Naproxen      Hair falls out       Past Medical History:  Past Medical History:  "  Diagnosis Date    Diabetes mellitus     Hodgkin lymphoma     dx- Aug 15, 2018, chemo 9/25/18-2/26/2019, due to start radiation    Hyperlipidemia     Hypertension     Pulmonary embolism     dx June 26, 2018, on lovenox        Past Surgical History:  Past Surgical History:   Procedure Laterality Date    ARTHROSCOPIC DEBRIDEMENT OF SHOULDER Left 1/16/2020    Procedure: DEBRIDEMENT, SHOULDER, ARTHROSCOPIC EXTENSIVE ARTHROSCOPIC RELEASE CAPSULAR RELEASE;  Surgeon: Ariel Valladares MD;  Location: [facility];  Service: Orthopedics;  Laterality: Left;  LEFT SHOULDER EXTENSIVE ARTHROSCOPIC DEBRIDEMENT   GETA + INTERSCALENE  VIDEO/confirmed 1/15/2020 1230 KB Christofer    BREAST BIOPSY Right     stereo rt 4/2018    CHOLECYSTECTOMY  12/1998    UMBILICAL HERNIA REPAIR  05/2001    mesh placed       Social History:  Social History     Occupational History    Not on file   Tobacco Use    Smoking status: Never    Smokeless tobacco: Never   Substance and Sexual Activity    Alcohol use: Yes     Comment: Socially    Drug use: No    Sexual activity: Not Currently     Birth control/protection: Abstinence       Family History:  Family History   Problem Relation Age of Onset    Breast cancer Mother     Breast cancer Maternal Cousin     Breast cancer Maternal Aunt     Colon cancer Neg Hx     Ovarian cancer Neg Hx         ROS:  Review of Systems   Musculoskeletal:  Positive for joint pain, myalgias and stiffness.   All other systems reviewed and are negative.      Vitals:  /89   Pulse 99   Ht 5' 5" (1.651 m)   Wt 107.3 kg (236 lb 8.9 oz)   BMI 39.36 kg/m²     Physical Examination:  Comprehensive Orthopaedic Musculoskeletal Exam    General      Constitutional: appears stated age, moderately obese, well-developed and well-nourished    Scleral icterus: no    Labored breathing: no    Psychiatric: normal mood and affect and no acute distress    Neurological: alert and oriented x3    Skin: intact    Lymphadenopathy: none     Ortho Exam "   Left shoulder exam:  No visible deformity.    Range of motion reveals active elevation to 110, passive elevation 130, external rotation 20, internal rotation L5.    Rotator cuff strength is 5/5 in elevation, external rotation, and internal rotation.  Positive for bony crepitus.    Imaging:  I have independently reviewed the following imaging studies performed at [facility]:    EXAMINATION:  CT SHOULDER WITHOUT CONTRAST LEFT     CLINICAL HISTORY:  Shoulder pain, chronic, osteoarthritis suspected;  Pain in left shoulder     TECHNIQUE:  CT left shoulder performed without contrast.  Coronal and sagittal reformats provided.     COMPARISON:  Radiographs 08/29/2023     FINDINGS:  No fracture or dislocation.  No lytic or blastic lesion.     There is severe loss of glenohumeral cartilage space with bulky osteophyte production.  There is remodeling of the glenoid.     Acromioclavicular joint is unremarkable.     Rotator cuff muscle bulk is maintained.     Note made of left-sided port.  Left lung is largely clear.  No left axillary lymphadenopathy.     Impression:     Severe left glenohumeral osteoarthritis.        Electronically signed by: Pan Oakley MD  Date:                                            09/06/2023  Time:                                           08:17    Assessment:  1. Osteoarthritis of glenohumeral joint, left      Plan:  I discussed left total shoulder arthroplasty.  We have establish a surgical date of September 28, 2023.  She is instructed to stop Xarelto 5 days before surgery.  Her primary care physician is provided her with Lovenox to start taking when she stops the Xarelto.  I told her last dose of Lovenox should be the morning of Wednesday September 27, 2023.  Risks, benefits, and alternatives were discussed.     No follow-ups on file.

## 2023-09-11 ENCOUNTER — TELEPHONE (OUTPATIENT)
Dept: PREADMISSION TESTING | Facility: HOSPITAL | Age: 48
End: 2023-09-11
Payer: MEDICAID

## 2023-09-11 DIAGNOSIS — Z01.818 PRE-OP EVALUATION: Primary | ICD-10-CM

## 2023-09-13 ENCOUNTER — CLINICAL SUPPORT (OUTPATIENT)
Dept: LAB | Facility: HOSPITAL | Age: 48
End: 2023-09-13
Attending: ORTHOPAEDIC SURGERY
Payer: MEDICAID

## 2023-09-13 DIAGNOSIS — Z01.818 PRE-OP EVALUATION: ICD-10-CM

## 2023-09-13 LAB
ANION GAP SERPL CALC-SCNC: 7 MMOL/L (ref 8–16)
BUN SERPL-MCNC: 18 MG/DL (ref 6–20)
CALCIUM SERPL-MCNC: 9.7 MG/DL (ref 8.7–10.5)
CHLORIDE SERPL-SCNC: 107 MMOL/L (ref 95–110)
CO2 SERPL-SCNC: 25 MMOL/L (ref 23–29)
CREAT SERPL-MCNC: 1 MG/DL (ref 0.5–1.4)
EST. GFR  (NO RACE VARIABLE): >60 ML/MIN/1.73 M^2
GLUCOSE SERPL-MCNC: 115 MG/DL (ref 70–110)
POTASSIUM SERPL-SCNC: 3.8 MMOL/L (ref 3.5–5.1)
SODIUM SERPL-SCNC: 139 MMOL/L (ref 136–145)

## 2023-09-13 PROCEDURE — 93010 EKG 12-LEAD: ICD-10-PCS | Mod: ,,, | Performed by: INTERNAL MEDICINE

## 2023-09-13 PROCEDURE — 93005 ELECTROCARDIOGRAM TRACING: CPT

## 2023-09-13 PROCEDURE — 93010 ELECTROCARDIOGRAM REPORT: CPT | Mod: ,,, | Performed by: INTERNAL MEDICINE

## 2023-09-13 PROCEDURE — 36415 COLL VENOUS BLD VENIPUNCTURE: CPT | Performed by: NURSE PRACTITIONER

## 2023-09-13 PROCEDURE — 80048 BASIC METABOLIC PNL TOTAL CA: CPT | Performed by: NURSE PRACTITIONER

## 2023-09-20 ENCOUNTER — HOSPITAL ENCOUNTER (OUTPATIENT)
Dept: PREADMISSION TESTING | Facility: HOSPITAL | Age: 48
Discharge: HOME | End: 2023-09-20
Attending: NURSE PRACTITIONER
Payer: MEDICAID

## 2023-09-20 ENCOUNTER — ANESTHESIA EVENT (OUTPATIENT)
Dept: SURGERY | Facility: HOSPITAL | Age: 48
End: 2023-09-20
Payer: MEDICAID

## 2023-09-20 PROBLEM — M47.816 SPONDYLOSIS OF LUMBAR SPINE: Status: ACTIVE | Noted: 2018-06-25

## 2023-09-20 PROBLEM — M46.1 SACROILIITIS, NOT ELSEWHERE CLASSIFIED: Status: ACTIVE | Noted: 2020-07-20

## 2023-09-20 PROBLEM — M47.27 OTHER SPONDYLOSIS WITH RADICULOPATHY, LUMBOSACRAL REGION: Status: ACTIVE | Noted: 2022-08-30

## 2023-09-20 PROBLEM — E11.9 DIABETES MELLITUS: Status: ACTIVE | Noted: 2023-09-20

## 2023-09-20 PROBLEM — I82.411 DVT FEMORAL (DEEP VENOUS THROMBOSIS) WITH THROMBOPHLEBITIS, RIGHT: Status: ACTIVE | Noted: 2018-08-17

## 2023-09-20 PROBLEM — C81.00: Status: ACTIVE | Noted: 2018-08-17

## 2023-09-20 PROBLEM — Z90.3 S/P GASTRIC SLEEVE PROCEDURE: Status: ACTIVE | Noted: 2023-02-27

## 2023-09-20 PROBLEM — G47.30 SLEEP APNEA WITH USE OF CONTINUOUS POSITIVE AIRWAY PRESSURE (CPAP): Status: ACTIVE | Noted: 2022-01-14

## 2023-09-20 PROBLEM — R59.0 AXILLARY LYMPHADENOPATHY: Status: ACTIVE | Noted: 2023-09-20

## 2023-09-20 PROBLEM — I26.99 PULMONARY EMBOLISM ON LONG-TERM ANTICOAGULATION THERAPY: Status: ACTIVE | Noted: 2018-08-17

## 2023-09-20 PROBLEM — Z98.890 H/O RIGHT BREAST BIOPSY: Status: ACTIVE | Noted: 2022-11-11

## 2023-09-20 PROBLEM — Z79.01 PULMONARY EMBOLISM ON LONG-TERM ANTICOAGULATION THERAPY: Status: ACTIVE | Noted: 2018-08-17

## 2023-09-20 PROBLEM — K57.30 COLON, DIVERTICULOSIS: Status: ACTIVE | Noted: 2022-02-17

## 2023-09-20 PROBLEM — K59.09 CHRONIC CONSTIPATION: Status: ACTIVE | Noted: 2022-02-17

## 2023-09-20 NOTE — ANESTHESIA PREPROCEDURE EVALUATION
09/20/2023  Rosaura Mitchell is a 48 y.o., female scheduled for ARTHROPLASTY, SHOULDER (Left: Shoulder) 9/28/23.    PCP clearance in media with labs, CXR, and EKG    Past Medical History:   Diagnosis Date    Diabetes mellitus     Hodgkin lymphoma     dx- Aug 15, 2018, chemo 9/25/18-2/26/2019, due to start radiation    Hyperlipidemia     Hypertension     Pulmonary embolism     dx June 26, 2018, on lovenox     Past Surgical History:   Procedure Laterality Date    ARTHROSCOPIC DEBRIDEMENT OF SHOULDER Left 1/16/2020    Procedure: DEBRIDEMENT, SHOULDER, ARTHROSCOPIC EXTENSIVE ARTHROSCOPIC RELEASE CAPSULAR RELEASE;  Surgeon: Ariel Valladares MD;  Location: [facility] OR;  Service: Orthopedics;  Laterality: Left;  LEFT SHOULDER EXTENSIVE ARTHROSCOPIC DEBRIDEMENT   GETA + INTERSCALENE  VIDEO/confirmed 1/15/2020 1230 KB Christofer    BREAST BIOPSY Right     stereo rt 4/2018    CHOLECYSTECTOMY  12/1998    UMBILICAL HERNIA REPAIR  05/2001    mesh placed     Review of patient's allergies indicates:   Allergen Reactions    Naproxen      Hair falls out     Current Outpatient Medications   Medication Instructions    ACCU-CHEK FASTCLIX Misc No dose, route, or frequency recorded.    acetaminophen-codeine 300-30mg (TYLENOL #3) 300-30 mg Tab 1 tablet, Oral    albuterol (PROVENTIL/VENTOLIN HFA) 90 mcg/actuation inhaler INHALE 2 PUFFS FOUR TIMES DAILY AS NEEDED FOR SHORTNESS OF BREATH    amitriptyline (ELAVIL) 50 MG tablet 1 tablet, Oral    atorvastatin (LIPITOR) 10 mg, Oral, Nightly    BINAXNOW COVID-19 AG SELF TEST Kit TEST AS DIRECTED TODAY    butalbital-acetaminophen-caffeine -40 mg (FIORICET, ESGIC) -40 mg per tablet No dose, route, or frequency recorded.    cholecalciferol (vitamin D3) 5,000 Units, Oral, Daily    citalopram (CELEXA) 40 mg, Oral    clonazePAM (KLONOPIN) 0.5 MG tablet clonazepam 0.5 mg  tablet    doxepin (SINEQUAN) 10 MG capsule TAKE 1 CAPSULE BY MOUTH AT BEDTIME AS NEEDED FOR SLEEP OR ITCHINESS    enoxaparin (LOVENOX) 120 mg/0.8 mL Syrg No dose, route, or frequency recorded.    famotidine (PEPCID) 20 MG tablet Oral    LIDOCAINE VISCOUS 2 % solution 10 mLs, Oral, Every 6 hours PRN    LINZESS 290 mcg, Oral, Every morning    losartan (COZAAR) 50 mg, Oral    meloxicam (MOBIC) 7.5 mg, Oral, Daily    NURTEC 75 mg odt Oral    ONETOUCH ULTRA BLUE TEST STRIP Strp No dose, route, or frequency recorded.    ONETOUCH ULTRA2 kit No dose, route, or frequency recorded.    oxyCODONE-acetaminophen (PERCOCET) 5-325 mg per tablet 1 tablet, Oral, Every 4 hours PRN    pantoprazole (PROTONIX) 40 mg, Oral    topiramate (TOPAMAX) 100 mg, Oral, 2 times daily    XARELTO 20 mg Tab No dose, route, or frequency recorded.    ZTLIDO 1.8 % PtMd 1 patch, Topical (Top), Daily PRN       Pre-op Assessment    I have reviewed the Patient Summary Reports.     I have reviewed the Nursing Notes. I have reviewed the NPO Status.   I have reviewed the Medications.     Review of Systems  Anesthesia Hx:  No problems with previous Anesthesia  History of prior surgery of interest to airway management or planning: gastric bypass.  Denies Personal Hx of Anesthesia complications.   Social:  Non-Smoker, Social Alcohol Use    EENT/Dental:   chronic allergic rhinitis   Cardiovascular:   Exercise tolerance: good Denies Pacemaker. Hypertension   Denies Angina. hyperlipidemia  Deep Venous Thrombosis (DVT), Hx of DVT, right lower extremity    Pulmonary:   Denies Shortness of breath. Sleep Apnea    Education provided regarding risk of obstructive sleep apnea   Pulmonary Embolism, > 6 months ago    Renal/:  Renal/ Normal     Hepatic/GI:  Hepatic/GI Normal    Musculoskeletal:   Arthritis     Neurological:   Denies TIA. Denies CVA. Neuromuscular Disease,  Denies Seizures.    Endocrine:   Diabetes (a1c 5.2 8/31/23 via [facility]), well  controlled, type 2  Obesity / BMI > 30      Physical Exam  General: Cooperative and Oriented    Airway:  Mallampati: III   Mouth Opening: Normal  Neck ROM: Normal ROM    Dental:  Intact    Skin:Left upper chest wall port    Lab Results   Component Value Date    WBC 4.82 01/16/2020    HGB 11.6 (L) 11/12/2022    HCT 34.1 (L) 11/12/2022     01/16/2020     09/13/2023    K 3.8 09/13/2023     09/13/2023    CREATININE 1.0 09/13/2023    BUN 18 09/13/2023    CO2 25 09/13/2023    TSH 0.921 04/23/2009     Results for orders placed or performed in visit on 09/13/23   EKG 12-lead    Collection Time: 09/13/23 12:19 PM    Narrative    Test Reason : Z01.818,    Vent. Rate : 093 BPM     Atrial Rate : 093 BPM     P-R Int : 146 ms          QRS Dur : 096 ms      QT Int : 376 ms       P-R-T Axes : 063 019 022 degrees     QTc Int : 467 ms    Normal sinus rhythm  Nonspecific T wave abnormality  Prolonged QT  Abnormal ECG  When compared with ECG of 16-JAN-2020 09:47,  Nonspecific T wave abnormality now evident in Lateral leads  Confirmed by Dev BRANNON MD, Jameel ROJAS ([id number]) on 9/13/2023 4:14:26 PM    Referred By: AMY PIMENTEL           Confirmed By:Jameel Méndez III, MD         Anesthesia Plan  Type of Anesthesia, risks & benefits discussed:    Anesthesia Type: Regional, Gen ETT  Intra-op Monitoring Plan: Standard ASA Monitors  Post Op Pain Control Plan: multimodal analgesia  Induction:  IV  ASA Score: 3  Day of Surgery Review of History & Physical: H&P Update referred to the surgeon/provider.  Anesthesia Plan Notes:       Ready For Surgery From Anesthesia Perspective.     .

## 2023-09-20 NOTE — PRE-PROCEDURE INSTRUCTIONS
Children    Allergies, medical, surgical, family and psychosocial histories reviewed with patient. Periop plan of care reviewed. Preop instructions given, including medications to take and to hold. Hibiclens soap and instructions on use given. Time allotted for questions to be addressed.  Patient verbalized understanding.    Arrival time 0700

## 2023-09-20 NOTE — DISCHARGE INSTRUCTIONS
Your surgery is scheduled for 8/28/23.    Please report to Hospital Front Lobby on the 1st Floor at 0700 a.m.    THIS TIME IS SUBJECT TO CHANGE.  YOU WILL RECEIVE A PHONE CALL THE DAY BEFORE SURGERY BY 3:30 PM TO CONFIRM YOUR TIME OF ARRIVAL.  IF YOU HAVE NOT RECEIVED A PHONE CALL BY 3:30 PM THE DAY BEFORE YOUR SURGERY PLEASE CALL [phone]     INSTRUCTIONS IMPORTANT!!!  ¨ Do not eat or drink after 12 midnight-including water, candy, gum, & mints. OK to brush teeth.      ____  Proceed to [facility] on *** for additional testing.        ____  Do not wear makeup, including mascara.  ____  No powder, lotions or creams to surgical area.  ____  Please remove all jewelry, including piercings and leave at home.  ____  No money or valuables needed. Please leave at home.  ____  Please bring any documents given by your doctor.  ____  If going home the same day, arrange for a ride home. You will not be able to             drive if Anesthesia was used.  ____  Children under 18 years require a parent / guardian present the entire time             they are in surgery / recovery.  ____  Wear loose fitting clothing. Allow for dressings, bandages.  ____  Stop Aspirin, Ibuprofen, Motrin, Aleve, Goody's/BC powders, Excedrine and Naproxen (NSAIDS) at least 3-5 days before surgery, unless otherwise instructed by your doctor, or the nurse.   You MAY use Tylenol/acetaminophen until day of surgery.  ____  Wash the surgical area with Hibiclens or Dial Antibacterial bar soap the night before surgery, and again the             morning of surgery.  Be sure to rinse hibiclens or Dial Antibacterial bar soap off completely (if instructed by   nurse).  ____  If you take diabetic medication including Metformin, Glimepiride, Glipizide, Glyburide, Byetta, Januvia, Actos, do not take am of surgery unless instructed by Doctor.  ____ Hold Invokana, Farxiga, and Jardiance for 3 days prior to surgery.   ____  Call MD for temperature  above 101 degrees or any other signs of infection such as Urinary (bladder) infection, Upper respiratory infection, skin boils, etc.  ____ Stop taking any Fish Oil supplement or any Vitamins that contain Vitamin E at least 5 days prior to surgery.  ____ Do Not wear your contact lenses the day of your procedure.  You may wear your glasses.      ____Do not shave surgical site for 3 days prior to surgery.  ____ Practice Good hand washing before, during, and after procedure.      I have read or had read and explained to me, and understand the above information.  Additional comments or instructions:  For additional questions call [phone]      ANESTHESIA SIDE EFFECTS  -For the first 24 hours after surgery:  Do not drive, use heavy equipment, make important decisions, or drink alcohol  -It is normal to feel sleepy for several hours.  Rest until you are more awake.  -Have someone stay with you, if needed.  They can watch for problems and help keep you safe.  -Some possible post anesthesia side effects include: nausea and vomiting, sore throat and hoarseness, sleepiness, and dizziness.        Pre-Op Bathing Instructions    Before surgery, you can play an important role in your own health.    Because skin is not sterile, we need to be sure that your skin is as free of germs as possible. By following the instructions below, you can reduce the number of germs on your skin before surgery.    IMPORTANT: You will need to shower with a special soap called Hibiclens*, available at any pharmacy.  If you are allergic to Chlorhexidine (the antiseptic in Hibiclens), use an antibacterial soap such as Dial Soap for your preoperative shower.  You will shower with Hibiclens both the night before your surgery and the morning of your surgery.  Do not use Hibiclens on the head, face or genitals to avoid injury to those areas.    STEP #1: THE NIGHT BEFORE YOUR SURGERY     Do not shave the area of your body where your surgery will be  performed.  Shower and wash your hair and body as usual with your normal soap and shampoo.  Rinse your hair and body thoroughly after you shower to remove all soap residue.  With your hand, apply one packet of Hibiclens soap to the surgical site.   Wash the site gently for five (5) minutes. Do not scrub your skin too hard.   Do not wash with your regular soap after Hibiclens is used.  Rinse your body thoroughly.  Pat yourself dry with a clean, soft towel.  Do not use lotion, cream, or powder.  Wear clean clothes.    STEP #2: THE MORNING OF YOUR SURGERY     Repeat Step #1.    * Not to be used by people allergic to Chlorhexidine.

## 2023-09-28 ENCOUNTER — ANESTHESIA (OUTPATIENT)
Dept: SURGERY | Facility: HOSPITAL | Age: 48
End: 2023-09-28
Payer: MEDICAID

## 2023-09-28 ENCOUNTER — HOSPITAL ENCOUNTER (OUTPATIENT)
Facility: HOSPITAL | Age: 48
Discharge: HOME | End: 2023-09-29
Attending: ORTHOPAEDIC SURGERY | Admitting: ORTHOPAEDIC SURGERY
Payer: MEDICAID

## 2023-09-28 DIAGNOSIS — M19.012 GLENOHUMERAL ARTHRITIS, LEFT: ICD-10-CM

## 2023-09-28 DIAGNOSIS — M25.519 SHOULDER PAIN, UNSPECIFIED CHRONICITY, UNSPECIFIED LATERALITY: Primary | ICD-10-CM

## 2023-09-28 LAB
ESTIMATED AVG GLUCOSE: 100 MG/DL (ref 68–131)
HBA1C MFR BLD: 5.1 % (ref 4–5.6)
POCT GLUCOSE: 84 MG/DL (ref 70–110)

## 2023-09-28 PROCEDURE — 63600175 PHARM REV CODE 636 W HCPCS: Performed by: NURSE ANESTHETIST, CERTIFIED REGISTERED

## 2023-09-28 PROCEDURE — 25000003 PHARM REV CODE 250: Performed by: ORTHOPAEDIC SURGERY

## 2023-09-28 PROCEDURE — 25000003 PHARM REV CODE 250: Performed by: NURSE ANESTHETIST, CERTIFIED REGISTERED

## 2023-09-28 PROCEDURE — 63600175 PHARM REV CODE 636 W HCPCS: Performed by: ANESTHESIOLOGY

## 2023-09-28 PROCEDURE — 23472 RECONSTRUCT SHOULDER JOINT: CPT | Mod: LT,,, | Performed by: ORTHOPAEDIC SURGERY

## 2023-09-28 PROCEDURE — 23472 PR RECONSTR TOTAL SHOULDER IMPLANT: ICD-10-PCS | Mod: LT,,, | Performed by: ORTHOPAEDIC SURGERY

## 2023-09-28 PROCEDURE — C1713 ANCHOR/SCREW BN/BN,TIS/BN: HCPCS | Performed by: ORTHOPAEDIC SURGERY

## 2023-09-28 PROCEDURE — D9220A PRA ANESTHESIA: Mod: ANES,,, | Performed by: ANESTHESIOLOGY

## 2023-09-28 PROCEDURE — 94799 UNLISTED PULMONARY SVC/PX: CPT

## 2023-09-28 PROCEDURE — 27201423 OPTIME MED/SURG SUP & DEVICES STERILE SUPPLY: Performed by: ORTHOPAEDIC SURGERY

## 2023-09-28 PROCEDURE — 25000003 PHARM REV CODE 250: Performed by: NURSE PRACTITIONER

## 2023-09-28 PROCEDURE — 36000711: Performed by: ORTHOPAEDIC SURGERY

## 2023-09-28 PROCEDURE — 36000710: Performed by: ORTHOPAEDIC SURGERY

## 2023-09-28 PROCEDURE — D9220A PRA ANESTHESIA: Mod: CRNA,,, | Performed by: NURSE ANESTHETIST, CERTIFIED REGISTERED

## 2023-09-28 PROCEDURE — 63600175 PHARM REV CODE 636 W HCPCS: Performed by: STUDENT IN AN ORGANIZED HEALTH CARE EDUCATION/TRAINING PROGRAM

## 2023-09-28 PROCEDURE — 83036 HEMOGLOBIN GLYCOSYLATED A1C: CPT | Performed by: ORTHOPAEDIC SURGERY

## 2023-09-28 PROCEDURE — D9220A PRA ANESTHESIA: ICD-10-PCS | Mod: ANES,,, | Performed by: ANESTHESIOLOGY

## 2023-09-28 PROCEDURE — C1769 GUIDE WIRE: HCPCS | Performed by: ORTHOPAEDIC SURGERY

## 2023-09-28 PROCEDURE — 64415 NJX AA&/STRD BRCH PLXS IMG: CPT | Performed by: ANESTHESIOLOGY

## 2023-09-28 PROCEDURE — 25000003 PHARM REV CODE 250: Performed by: STUDENT IN AN ORGANIZED HEALTH CARE EDUCATION/TRAINING PROGRAM

## 2023-09-28 PROCEDURE — 71000039 HC RECOVERY, EACH ADD'L HOUR: Performed by: ORTHOPAEDIC SURGERY

## 2023-09-28 PROCEDURE — 71000033 HC RECOVERY, INTIAL HOUR: Performed by: ORTHOPAEDIC SURGERY

## 2023-09-28 PROCEDURE — 63600175 PHARM REV CODE 636 W HCPCS: Performed by: ORTHOPAEDIC SURGERY

## 2023-09-28 PROCEDURE — 94761 N-INVAS EAR/PLS OXIMETRY MLT: CPT

## 2023-09-28 PROCEDURE — C1776 JOINT DEVICE (IMPLANTABLE): HCPCS | Performed by: ORTHOPAEDIC SURGERY

## 2023-09-28 PROCEDURE — 37000008 HC ANESTHESIA 1ST 15 MINUTES: Performed by: ORTHOPAEDIC SURGERY

## 2023-09-28 PROCEDURE — 25000003 PHARM REV CODE 250

## 2023-09-28 PROCEDURE — 37000009 HC ANESTHESIA EA ADD 15 MINS: Performed by: ORTHOPAEDIC SURGERY

## 2023-09-28 PROCEDURE — 36415 COLL VENOUS BLD VENIPUNCTURE: CPT | Performed by: ORTHOPAEDIC SURGERY

## 2023-09-28 PROCEDURE — D9220A PRA ANESTHESIA: ICD-10-PCS | Mod: CRNA,,, | Performed by: NURSE ANESTHETIST, CERTIFIED REGISTERED

## 2023-09-28 DEVICE — CORTILOC® PEGGED GLENOID
Type: IMPLANTABLE DEVICE | Site: SHOULDER | Status: FUNCTIONAL
Brand: TORNIER PERFORM® ANATOMIC GLENOID

## 2023-09-28 DEVICE — CEMENT BONE SMPLX HV GENTMYCN: Type: IMPLANTABLE DEVICE | Site: SHOULDER | Status: FUNCTIONAL

## 2023-09-28 DEVICE — GUIDE WIRE
Type: IMPLANTABLE DEVICE | Site: SHOULDER | Status: FUNCTIONAL
Brand: TORNIER PERFORM

## 2023-09-28 RX ORDER — PROPOFOL 10 MG/ML
VIAL (ML) INTRAVENOUS
Status: DISCONTINUED | OUTPATIENT
Start: 2023-09-28 | End: 2023-09-28

## 2023-09-28 RX ORDER — CEFAZOLIN SODIUM 2 G/50ML
2 SOLUTION INTRAVENOUS
Status: COMPLETED | OUTPATIENT
Start: 2023-09-28 | End: 2023-09-28

## 2023-09-28 RX ORDER — DIPHENHYDRAMINE HYDROCHLORIDE 50 MG/ML
INJECTION INTRAMUSCULAR; INTRAVENOUS
Status: DISCONTINUED | OUTPATIENT
Start: 2023-09-28 | End: 2023-09-28

## 2023-09-28 RX ORDER — OXYCODONE AND ACETAMINOPHEN 5; 325 MG/1; MG/1
1 TABLET ORAL EVERY 4 HOURS PRN
Qty: 28 TABLET | Refills: 0 | Status: SHIPPED | OUTPATIENT
Start: 2023-09-28 | End: 2023-09-29 | Stop reason: HOSPADM

## 2023-09-28 RX ORDER — OXYCODONE HYDROCHLORIDE 5 MG/1
10 TABLET ORAL EVERY 6 HOURS PRN
Status: DISCONTINUED | OUTPATIENT
Start: 2023-09-28 | End: 2023-09-29

## 2023-09-28 RX ORDER — OXYCODONE HYDROCHLORIDE 5 MG/1
5 TABLET ORAL EVERY 6 HOURS PRN
Status: DISCONTINUED | OUTPATIENT
Start: 2023-09-28 | End: 2023-09-29

## 2023-09-28 RX ORDER — SODIUM CHLORIDE 9 MG/ML
INJECTION, SOLUTION INTRAVENOUS CONTINUOUS
Status: DISCONTINUED | OUTPATIENT
Start: 2023-09-28 | End: 2023-09-29 | Stop reason: HOSPADM

## 2023-09-28 RX ORDER — MORPHINE SULFATE 2 MG/ML
2 INJECTION, SOLUTION INTRAMUSCULAR; INTRAVENOUS EVERY 4 HOURS PRN
Status: DISCONTINUED | OUTPATIENT
Start: 2023-09-28 | End: 2023-09-29 | Stop reason: HOSPADM

## 2023-09-28 RX ORDER — AMITRIPTYLINE HYDROCHLORIDE 25 MG/1
50 TABLET, FILM COATED ORAL NIGHTLY
Status: DISCONTINUED | OUTPATIENT
Start: 2023-09-28 | End: 2023-09-29 | Stop reason: HOSPADM

## 2023-09-28 RX ORDER — ONDANSETRON 2 MG/ML
4 INJECTION INTRAMUSCULAR; INTRAVENOUS DAILY PRN
Status: DISCONTINUED | OUTPATIENT
Start: 2023-09-28 | End: 2023-09-28 | Stop reason: HOSPADM

## 2023-09-28 RX ORDER — TALC
6 POWDER (GRAM) TOPICAL NIGHTLY PRN
Status: DISCONTINUED | OUTPATIENT
Start: 2023-09-28 | End: 2023-09-29 | Stop reason: HOSPADM

## 2023-09-28 RX ORDER — PHENYLEPHRINE HYDROCHLORIDE 10 MG/ML
INJECTION INTRAVENOUS
Status: DISCONTINUED | OUTPATIENT
Start: 2023-09-28 | End: 2023-09-28

## 2023-09-28 RX ORDER — FENTANYL CITRATE 50 UG/ML
INJECTION, SOLUTION INTRAMUSCULAR; INTRAVENOUS
Status: DISCONTINUED | OUTPATIENT
Start: 2023-09-28 | End: 2023-09-28

## 2023-09-28 RX ORDER — MUPIROCIN 20 MG/G
OINTMENT TOPICAL
Status: DISCONTINUED | OUTPATIENT
Start: 2023-09-28 | End: 2023-09-28 | Stop reason: HOSPADM

## 2023-09-28 RX ORDER — EPHEDRINE SULFATE 50 MG/ML
INJECTION, SOLUTION INTRAVENOUS
Status: DISCONTINUED | OUTPATIENT
Start: 2023-09-28 | End: 2023-09-28

## 2023-09-28 RX ORDER — MIDAZOLAM HYDROCHLORIDE 1 MG/ML
INJECTION, SOLUTION INTRAMUSCULAR; INTRAVENOUS
Status: DISCONTINUED | OUTPATIENT
Start: 2023-09-28 | End: 2023-09-28

## 2023-09-28 RX ORDER — ROPIVACAINE HYDROCHLORIDE 5 MG/ML
INJECTION, SOLUTION EPIDURAL; INFILTRATION; PERINEURAL
Status: COMPLETED | OUTPATIENT
Start: 2023-09-28 | End: 2023-09-28

## 2023-09-28 RX ORDER — CEFAZOLIN SODIUM 2 G/50ML
2 SOLUTION INTRAVENOUS
Status: DISPENSED | OUTPATIENT
Start: 2023-09-28 | End: 2023-09-29

## 2023-09-28 RX ORDER — SODIUM CHLORIDE, SODIUM LACTATE, POTASSIUM CHLORIDE, CALCIUM CHLORIDE 600; 310; 30; 20 MG/100ML; MG/100ML; MG/100ML; MG/100ML
INJECTION, SOLUTION INTRAVENOUS CONTINUOUS
Status: DISCONTINUED | OUTPATIENT
Start: 2023-09-28 | End: 2023-09-29 | Stop reason: HOSPADM

## 2023-09-28 RX ORDER — FENTANYL CITRATE 50 UG/ML
25 INJECTION, SOLUTION INTRAMUSCULAR; INTRAVENOUS EVERY 5 MIN PRN
Status: DISCONTINUED | OUTPATIENT
Start: 2023-09-28 | End: 2023-09-28 | Stop reason: HOSPADM

## 2023-09-28 RX ORDER — ROCURONIUM BROMIDE 10 MG/ML
INJECTION, SOLUTION INTRAVENOUS
Status: DISCONTINUED | OUTPATIENT
Start: 2023-09-28 | End: 2023-09-28

## 2023-09-28 RX ORDER — ATORVASTATIN CALCIUM 10 MG/1
10 TABLET, FILM COATED ORAL NIGHTLY
Status: DISCONTINUED | OUTPATIENT
Start: 2023-09-28 | End: 2023-09-29 | Stop reason: HOSPADM

## 2023-09-28 RX ORDER — FAMOTIDINE 20 MG/1
20 TABLET, FILM COATED ORAL 2 TIMES DAILY
Status: DISCONTINUED | OUTPATIENT
Start: 2023-09-28 | End: 2023-09-28

## 2023-09-28 RX ORDER — ONDANSETRON 2 MG/ML
4 INJECTION INTRAMUSCULAR; INTRAVENOUS EVERY 12 HOURS PRN
Status: DISCONTINUED | OUTPATIENT
Start: 2023-09-28 | End: 2023-09-29 | Stop reason: HOSPADM

## 2023-09-28 RX ORDER — SENNOSIDES 8.6 MG/1
8.6 TABLET ORAL 2 TIMES DAILY
Status: DISCONTINUED | OUTPATIENT
Start: 2023-09-28 | End: 2023-09-29 | Stop reason: HOSPADM

## 2023-09-28 RX ORDER — LIDOCAINE HYDROCHLORIDE 20 MG/ML
INJECTION INTRAVENOUS
Status: DISCONTINUED | OUTPATIENT
Start: 2023-09-28 | End: 2023-09-28

## 2023-09-28 RX ORDER — CITALOPRAM 20 MG/1
40 TABLET, FILM COATED ORAL DAILY
Status: DISCONTINUED | OUTPATIENT
Start: 2023-09-29 | End: 2023-09-29 | Stop reason: HOSPADM

## 2023-09-28 RX ORDER — TRANEXAMIC ACID 10 MG/ML
1000 INJECTION, SOLUTION INTRAVENOUS
Status: DISCONTINUED | OUTPATIENT
Start: 2023-09-28 | End: 2023-09-28 | Stop reason: HOSPADM

## 2023-09-28 RX ORDER — LIDOCAINE HYDROCHLORIDE 10 MG/ML
1 INJECTION, SOLUTION EPIDURAL; INFILTRATION; INTRACAUDAL; PERINEURAL ONCE
Status: DISCONTINUED | OUTPATIENT
Start: 2023-09-28 | End: 2023-09-28 | Stop reason: HOSPADM

## 2023-09-28 RX ORDER — METHOCARBAMOL 500 MG/1
500 TABLET, FILM COATED ORAL 4 TIMES DAILY
Status: DISCONTINUED | OUTPATIENT
Start: 2023-09-28 | End: 2023-09-29 | Stop reason: HOSPADM

## 2023-09-28 RX ORDER — FAMOTIDINE 20 MG/1
20 TABLET, FILM COATED ORAL DAILY
Status: DISCONTINUED | OUTPATIENT
Start: 2023-09-29 | End: 2023-09-29 | Stop reason: HOSPADM

## 2023-09-28 RX ORDER — LOSARTAN POTASSIUM 50 MG/1
50 TABLET ORAL DAILY
Status: DISCONTINUED | OUTPATIENT
Start: 2023-09-29 | End: 2023-09-28

## 2023-09-28 RX ORDER — ACETAMINOPHEN 500 MG
1000 TABLET ORAL
Status: DISCONTINUED | OUTPATIENT
Start: 2023-09-28 | End: 2023-09-29 | Stop reason: HOSPADM

## 2023-09-28 RX ORDER — TRANEXAMIC ACID 100 MG/ML
INJECTION, SOLUTION INTRAVENOUS
Status: DISCONTINUED | OUTPATIENT
Start: 2023-09-28 | End: 2023-09-28

## 2023-09-28 RX ORDER — OXYCODONE AND ACETAMINOPHEN 5; 325 MG/1; MG/1
1 TABLET ORAL
Status: DISCONTINUED | OUTPATIENT
Start: 2023-09-28 | End: 2023-09-28 | Stop reason: HOSPADM

## 2023-09-28 RX ORDER — PROCHLORPERAZINE EDISYLATE 5 MG/ML
5 INJECTION INTRAMUSCULAR; INTRAVENOUS EVERY 30 MIN PRN
Status: DISCONTINUED | OUTPATIENT
Start: 2023-09-28 | End: 2023-09-28 | Stop reason: HOSPADM

## 2023-09-28 RX ORDER — SCOLOPAMINE TRANSDERMAL SYSTEM 1 MG/1
1 PATCH, EXTENDED RELEASE TRANSDERMAL
Status: DISCONTINUED | OUTPATIENT
Start: 2023-09-28 | End: 2023-09-28 | Stop reason: HOSPADM

## 2023-09-28 RX ORDER — ONDANSETRON 2 MG/ML
INJECTION INTRAMUSCULAR; INTRAVENOUS
Status: DISCONTINUED | OUTPATIENT
Start: 2023-09-28 | End: 2023-09-28

## 2023-09-28 RX ORDER — SODIUM CHLORIDE, SODIUM LACTATE, POTASSIUM CHLORIDE, CALCIUM CHLORIDE 600; 310; 30; 20 MG/100ML; MG/100ML; MG/100ML; MG/100ML
INJECTION, SOLUTION INTRAVENOUS CONTINUOUS PRN
Status: DISCONTINUED | OUTPATIENT
Start: 2023-09-28 | End: 2023-09-28

## 2023-09-28 RX ORDER — ACETAMINOPHEN 10 MG/ML
INJECTION, SOLUTION INTRAVENOUS
Status: DISCONTINUED | OUTPATIENT
Start: 2023-09-28 | End: 2023-09-28

## 2023-09-28 RX ORDER — BISACODYL 10 MG
10 SUPPOSITORY, RECTAL RECTAL 2 TIMES DAILY PRN
Status: DISCONTINUED | OUTPATIENT
Start: 2023-09-28 | End: 2023-09-29 | Stop reason: HOSPADM

## 2023-09-28 RX ORDER — CELECOXIB 100 MG/1
100 CAPSULE ORAL 2 TIMES DAILY
Status: DISCONTINUED | OUTPATIENT
Start: 2023-09-28 | End: 2023-09-29

## 2023-09-28 RX ORDER — ONDANSETRON 4 MG/1
4 TABLET, FILM COATED ORAL 2 TIMES DAILY
Qty: 28 TABLET | Refills: 0 | Status: SHIPPED | OUTPATIENT
Start: 2023-09-28 | End: 2023-09-29 | Stop reason: SDUPTHER

## 2023-09-28 RX ORDER — HYDROMORPHONE HYDROCHLORIDE 2 MG/ML
0.2 INJECTION, SOLUTION INTRAMUSCULAR; INTRAVENOUS; SUBCUTANEOUS EVERY 5 MIN PRN
Status: DISCONTINUED | OUTPATIENT
Start: 2023-09-28 | End: 2023-09-28 | Stop reason: HOSPADM

## 2023-09-28 RX ORDER — PREGABALIN 75 MG/1
75 CAPSULE ORAL 2 TIMES DAILY
Status: DISCONTINUED | OUTPATIENT
Start: 2023-09-28 | End: 2023-09-29 | Stop reason: HOSPADM

## 2023-09-28 RX ADMIN — PHENYLEPHRINE HYDROCHLORIDE 80 MCG: 10 INJECTION INTRAVENOUS at 11:09

## 2023-09-28 RX ADMIN — ACETAMINOPHEN 1000 MG: 10 INJECTION, SOLUTION INTRAVENOUS at 10:09

## 2023-09-28 RX ADMIN — ROPIVACAINE HYDROCHLORIDE 25 ML: 5 INJECTION, SOLUTION EPIDURAL; INFILTRATION; PERINEURAL at 09:09

## 2023-09-28 RX ADMIN — SODIUM CHLORIDE, SODIUM LACTATE, POTASSIUM CHLORIDE, AND CALCIUM CHLORIDE: .6; .31; .03; .02 INJECTION, SOLUTION INTRAVENOUS at 12:09

## 2023-09-28 RX ADMIN — SODIUM CHLORIDE, SODIUM LACTATE, POTASSIUM CHLORIDE, AND CALCIUM CHLORIDE: .6; .31; .03; .02 INJECTION, SOLUTION INTRAVENOUS at 09:09

## 2023-09-28 RX ADMIN — ROCURONIUM BROMIDE 50 MG: 10 INJECTION, SOLUTION INTRAVENOUS at 10:09

## 2023-09-28 RX ADMIN — PROPOFOL 50 MG: 10 INJECTION, EMULSION INTRAVENOUS at 01:09

## 2023-09-28 RX ADMIN — MORPHINE SULFATE 2 MG: 2 INJECTION, SOLUTION INTRAMUSCULAR; INTRAVENOUS at 11:09

## 2023-09-28 RX ADMIN — DIPHENHYDRAMINE HYDROCHLORIDE 12.5 MG: 50 INJECTION INTRAMUSCULAR; INTRAVENOUS at 10:09

## 2023-09-28 RX ADMIN — ATORVASTATIN CALCIUM 10 MG: 10 TABLET, FILM COATED ORAL at 08:09

## 2023-09-28 RX ADMIN — MIDAZOLAM 2 MG: 1 INJECTION INTRAMUSCULAR; INTRAVENOUS at 09:09

## 2023-09-28 RX ADMIN — PHENYLEPHRINE HYDROCHLORIDE 160 MCG: 10 INJECTION INTRAVENOUS at 10:09

## 2023-09-28 RX ADMIN — PHENYLEPHRINE HYDROCHLORIDE 80 MCG: 10 INJECTION INTRAVENOUS at 01:09

## 2023-09-28 RX ADMIN — SENNOSIDES 8.6 MG: 8.6 TABLET, FILM COATED ORAL at 08:09

## 2023-09-28 RX ADMIN — ACETAMINOPHEN 1000 MG: 500 TABLET ORAL at 05:09

## 2023-09-28 RX ADMIN — OXYCODONE HYDROCHLORIDE 5 MG: 5 TABLET ORAL at 04:09

## 2023-09-28 RX ADMIN — TRANEXAMIC ACID 1000 MG: 100 INJECTION, SOLUTION INTRAVENOUS at 10:09

## 2023-09-28 RX ADMIN — CELECOXIB 100 MG: 100 CAPSULE ORAL at 08:09

## 2023-09-28 RX ADMIN — CEFAZOLIN SODIUM 2 G: 2 SOLUTION INTRAVENOUS at 10:09

## 2023-09-28 RX ADMIN — SCOPALAMINE 1 PATCH: 1 PATCH, EXTENDED RELEASE TRANSDERMAL at 09:09

## 2023-09-28 RX ADMIN — ROCURONIUM BROMIDE 30 MG: 10 INJECTION, SOLUTION INTRAVENOUS at 11:09

## 2023-09-28 RX ADMIN — PHENYLEPHRINE HYDROCHLORIDE 80 MCG: 10 INJECTION INTRAVENOUS at 12:09

## 2023-09-28 RX ADMIN — EPHEDRINE SULFATE 10 MG: 50 INJECTION, SOLUTION INTRAMUSCULAR; INTRAVENOUS; SUBCUTANEOUS at 01:09

## 2023-09-28 RX ADMIN — OXYCODONE HYDROCHLORIDE 10 MG: 5 TABLET ORAL at 10:09

## 2023-09-28 RX ADMIN — PROPOFOL 160 MG: 10 INJECTION, EMULSION INTRAVENOUS at 10:09

## 2023-09-28 RX ADMIN — EPHEDRINE SULFATE 10 MG: 50 INJECTION, SOLUTION INTRAMUSCULAR; INTRAVENOUS; SUBCUTANEOUS at 11:09

## 2023-09-28 RX ADMIN — FENTANYL CITRATE 100 MCG: 0.05 INJECTION, SOLUTION INTRAMUSCULAR; INTRAVENOUS at 10:09

## 2023-09-28 RX ADMIN — LIDOCAINE HYDROCHLORIDE 75 MG: 20 INJECTION, SOLUTION INTRAVENOUS at 10:09

## 2023-09-28 RX ADMIN — PHENYLEPHRINE HYDROCHLORIDE 0.3 MCG/KG/MIN: 10 INJECTION INTRAVENOUS at 10:09

## 2023-09-28 RX ADMIN — ONDANSETRON 8 MG: 2 INJECTION, SOLUTION INTRAMUSCULAR; INTRAVENOUS at 12:09

## 2023-09-28 RX ADMIN — AMITRIPTYLINE HYDROCHLORIDE 50 MG: 25 TABLET, FILM COATED ORAL at 08:09

## 2023-09-28 RX ADMIN — ACETAMINOPHEN 1000 MG: 500 TABLET ORAL at 10:09

## 2023-09-28 RX ADMIN — TRANEXAMIC ACID 1000 MG: 100 INJECTION, SOLUTION INTRAVENOUS at 12:09

## 2023-09-28 RX ADMIN — FENTANYL CITRATE 25 MCG: 50 INJECTION INTRAMUSCULAR; INTRAVENOUS at 04:09

## 2023-09-28 RX ADMIN — MUPIROCIN: 20 OINTMENT TOPICAL at 09:09

## 2023-09-28 RX ADMIN — PREGABALIN 75 MG: 75 CAPSULE ORAL at 08:09

## 2023-09-28 RX ADMIN — PHENYLEPHRINE HYDROCHLORIDE 240 MCG: 10 INJECTION INTRAVENOUS at 10:09

## 2023-09-28 RX ADMIN — METHOCARBAMOL TABLETS 500 MG: 500 TABLET, COATED ORAL at 08:09

## 2023-09-28 RX ADMIN — Medication 6 MG: at 11:09

## 2023-09-28 NOTE — ANESTHESIA PROCEDURE NOTES
Peripheral Block    Patient location during procedure: pre-op   Block not for primary anesthetic.  Reason for block: at surgeon's request and post-op pain management   Post-op Pain Location: left shoulder pain   Start time: 9/28/2023 9:40 AM  Timeout: 9/28/2023 9:35 AM   End time: 9/28/2023 9:44 AM    Staffing  Authorizing Provider: Malvin Coughlin MD  Performing Provider: Malvin Coughlin MD    Staffing  Performed by: Malvin Coughlin MD  Authorized by: Malvin Coughlin MD    Preanesthetic Checklist  Completed: patient identified, IV checked, site marked, risks and benefits discussed, surgical consent, monitors and equipment checked, pre-op evaluation and timeout performed  Peripheral Block  Patient position: sitting  Prep: ChloraPrep  Patient monitoring: heart rate, cardiac monitor, continuous pulse ox, continuous capnometry and frequent blood pressure checks  Block type: interscalene  Laterality: left  Injection technique: single shot  Needle  Needle type: Stimuplex   Needle gauge: 20 G  Needle length: 4 in  Needle localization: anatomical landmarks and ultrasound guidance   -ultrasound image captured on disc.  Assessment  Injection assessment: negative aspiration, negative parasthesia and local visualized surrounding nerve  Paresthesia pain: none  Heart rate change: no  Slow fractionated injection: yes    Medications:    Medications: ropivacaine (NAROPIN) injection 0.5% - Perineural   25 mL - 9/28/2023 9:42:00 AM    Additional Notes  VSS.  [facility] RN monitoring vitals throughout procedure.  Patient tolerated procedure well.

## 2023-09-28 NOTE — ANESTHESIA POSTPROCEDURE EVALUATION
Anesthesia Post Evaluation    Patient: Rosaura Mitchell    Procedure(s) Performed: Procedure(s) (LRB):  ARTHROPLASTY, SHOULDER (Left)    Final Anesthesia Type: general      Patient location during evaluation: PACU  Patient participation: Yes- Able to Participate  Level of consciousness: awake and alert  Post-procedure vital signs: reviewed and stable  Pain management: adequate  Airway patency: patent    PONV status at discharge: No PONV  Anesthetic complications: no      Cardiovascular status: stable  Respiratory status: spontaneous ventilation  Hydration status: euvolemic  Follow-up not needed.          Vitals Value Taken Time   /76 09/28/23 1521   Temp 36.7 °C (98.1 °F) 09/28/23 1350   Pulse 84 09/28/23 1523   Resp 17 09/28/23 1523   SpO2 97 % 09/28/23 1523   Vitals shown include unvalidated device data.      No case tracking events are documented in the log.      Pain/Thomas Score: Thomas Score: 9 (9/28/2023  3:15 PM)

## 2023-09-28 NOTE — BRIEF OP NOTE
[facility] - Surgery (Hospital)  Brief Operative Note    Surgery Date: 9/28/2023     Surgeon(s) and Role:     * Ariel Valladares MD - Primary    Assisting Surgeon: None    Pre-op Diagnosis:  Osteoarthritis of glenohumeral joint, left [M19.012]    Post-op Diagnosis:  Post-Op Diagnosis Codes:     * Osteoarthritis of glenohumeral joint, left [M19.012]    Procedure(s) (LRB):  ARTHROPLASTY, SHOULDER (Left)    Anesthesia: General/Regional    Operative Findings: see op note    Estimated Blood Loss: * No values recorded between 9/28/2023 11:04 AM and 9/28/2023  1:12 PM *         Specimens:   Specimen (24h ago, onward)      None          Plan:  NWB EDWIN  PT/OT  MM pain control  Ancef 2g q8 for 2 doses  Post op XR  Plan for discharge in AM

## 2023-09-28 NOTE — NURSING
Patient received from PACU in stable condition in no acute distress. Bed weight and VS taken. Patient and family oriented to room and call light. Safety maintained. Bed alarm set. Will continue to monitor.

## 2023-09-28 NOTE — PT/OT/SLP PROGRESS
Occupational Therapy      Patient Name:  Rosaura Mitchell   MRN:  [id number]    3:50 PM Patient not seen today secondary to Patient fatigue, awake but reports still somewhat tired from surgery. Nsg reports that pt has assigned room and will not d/c until tomorrow. Nsg reports that she would like for pt to have some increased time for recovery prior to evaluation as well.  Will follow-up as able.    9/28/2023

## 2023-09-28 NOTE — OP NOTE
Facility:  [facility]    Date of Surgery:  09/28/2023     Surgeon:  Ariel Valladares MD    First Assistant:  Tang Kramer MD    Second Assistant:  Angelo Leslie MD    Anesthesia:  GETA + Interscalene    Pre-operative Diagnosis:  Left glenohumeral osteoarthritis    Indication:  Improve pain    Post-operative Diagnosis:  Left glenohumeral osteoarthritis    Procedure:  Left anatomic primary total shoulder arthroplasty    Implants:  Cherelle/Tornier Ascend Flex 6 standard PTC humeral stem  Cherelle/Tornier PerFORM Cortiloc Pegged Glenoid Size M40  Cherelle/Tornier 51 mm x 20 mm low offset humeral head    The patient was identified in the pre-operative holding area. The correct extremity was marked and written informed consent was verified. Interscalene block was performed by the anesthesia team. The patient was transferred to the OR. The patient was placed on the OR table. General anesthesia was administered. Patient was positioned into a beach chair position. The head was secured with the neck in a neutral position. Bony prominences were well-padded. Bilateral NICOLE hoses and SCDs were applied. The shoulder was wiped down with hydrogen peroxide. The operative extremity was prepped and draped in the usual sterile fashion using chloroprep. A surgical timeout was performed to verify the correct extremely, administration of IV antibiotics withing 30 minutes of surgery start time, and administration of 1 gram TXA IVPB.     A deltopectoral approach was performed. Cephalic vein identified and retracted laterally. A larson elevator was used to release subdeltoid and subacromial adhesions. A Brown retractor was placed beneath the deltoid. The clavipectoral fascia was divided just lateral to the conjoint tendon. A deep narrow Victoria was placed beneath the conjoint tendon. The upper 1/3 of the pectoralis major tendon was divided to improve exposure. The long head of the biceps was identified. The sheath was  opened proximally to the rotator interval and to the upper portion of the pectoralis major tendon. The long head of the biceps tendon was tenodesed to the upper pectoralis major tendon using multiple 0-vicryl sutures. The long head of the biceps was divided just above the tenodesis site. The subscapularis was identified and tagged with multiple 0-vicryl sutures. The upper and lower borders of the subscapularis were clearly identified. The three sisters at the inferior border of the subscapularis were suture ligated medially and laterally using a 0-vicryl stitch. A subscapularis tenotomy was made approximately 1 cm medial to the lesser tuberosity. The humeral head was dislocated with extension, adduction, and external rotation of the extremity. The capsule was dissected off the humeral neck, carefully getting on bone and staying on bone.     The humeral head demonstrated complete loss of articular cartilage. Peripheral osteophytes were removed with a rongeur. A starting awl was positioned down the middle of the humeral canal. The humeral osteotomy was performed in a freehand manner by outlining the capsular attachments to the anatomic neck. The resected head was placed on the back table and measured. The maximal humeral canal diameter was determined by using progressively larger sounders, placed in 30 degrees of retroversion. The size 6 sounder had a tight fit. A guided punch was used to remove medial metaphyseal bone. The sounder was removed and the humerus was broached in 30 degrees of retroversion until there was excellent press-fit. A size 6 broach demonstrated excellent press-fit. The B inclination best matched the neck cut. A calcar reamer was used to flatten the humeral cut. A humeral cut protector was secured to the broach.     A Fakuda retractor was placed behind the posterior lip of the glenoid to retract the humerus posteriorly. Circumferential exposure of the bony glenoid was achieved by excising any  remaining labrum. The remaining portion of the long head of the biceps was excised. A Batman retractor was positioned along the anterior glenoid. A transverse and longitudinal line was drawn on the face of the glenoid using Bovie to identify the center of the glenoid. A medium guide fit on the glenoid face very nicely. Guide pin was placed through the guide into the center of the glenoid in a perpendicular manner. The glenoid face was reamed until there was punctate bleeding and a positive Paprika sign. The hard subchondral bone was preserved. The central hole for the central post was drilled over the guide wire. The glenoid pegged guide was used to drill the three peg holes. A trial glenoid was placed and determined to be appropriately positioned and well-seated. The trial was removed. A 2nd dose of 1 g TXA IVPB was given. The glenoid was copiously irrigated with a Pulsavac. The peg holes were dried with Raytec sponges. The real glenoid component was cemented in place and excess cement was removed. After cement cure, the humerus was exposed.     A size 51 mm x 20 mm head matched the neck cut and resected head most closely. The shoulder was reduced and trialed. In neutral position, the head faced the glenoid and had approximately a 50% anterior-posterior shuck. ROM was excellent. The trial humeral components were removed. The real humeral components were opened and assembled to match the trial components. The humeral canal was irrigated thoroughly with Pulsavac. The real humeral components were impacted in place in a press-fit manner. The shoulder was reduced.     The glenohumeral joint was irrigated with Pulsavac. A three sided release of the subscapularis tendon was performed to enable a tension free repair. Three No. 2 Fiberwire sutures inn a figure-of-8 fashion was used to repair the subscapularis. The repair was anatomic and tension free to 20-30 degrees of external rotation.     A medium Hemovac drain was  placed in the deltopectoral interval and brought out superiorly. The deltopectoral interval was closed with multiple 0-vicryl sutures deep to the cephalic vein. The subcutaneous layer was closed with 2-0 vicryl in an interrupted buried fashion. The skin was closed with a 3-0 subcuticular running monocryl, Dermabond, and Steri-Strips. 1 g TXA IVPB was given at the time of closure.    A sterile dressing was placed. A sling was placed. The patient was awakened and transferred to the PACU in stable condition.     EBL:  100    Drains:  Hemovac x 1    Complications:  None known

## 2023-09-28 NOTE — INTERVAL H&P NOTE
The patient has been examined and the H&P has been reviewed:    I concur with the findings and no changes have occurred since H&P was written.    Surgery risks, benefits and alternative options discussed and understood by patient/family.          There are no hospital problems to display for this patient.    I have reviewed the H&P. There are no interval changes to report.

## 2023-09-28 NOTE — ANESTHESIA PROCEDURE NOTES
Intubation    Date/Time: 9/28/2023 10:33 AM    Performed by: Sami Buckner Jr., CRNA  Authorized by: Malvin Coughlin MD    Intubation:     Induction:  Intravenous    Intubated:  Postinduction    Mask Ventilation:  Easy mask    Attempts:  1    Attempted By:  Student (SPARKLE Maya)    Method of Intubation:  Video laryngoscopy    Blade:  Rios 3    Laryngeal View Grade: Grade I - full view of cords      Difficult Airway Encountered?: No      Complications:  None    Airway Device:  Oral endotracheal tube    Airway Device Size:  7.0    Style/Cuff Inflation:  Cuffed (inflated to minimal occlusive pressure)    Tube secured:  22    Secured at:  The teeth    Placement Verified By:  Capnometry and Revisualization with laryngoscopy    Complicating Factors:  Obesity    Findings Post-Intubation:  BS equal bilateral and atraumatic/condition of teeth unchanged  Notes:      SPARKLE Maya

## 2023-09-28 NOTE — PLAN OF CARE
Hospital medicine arrived at bedside in PACU for consultation.  Patient awake and able to participate in interview.  Consult complete at 9430.

## 2023-09-28 NOTE — CONSULTS
Consult note  [facility]    Consulted by: Dr. Valladares  Reason for Consult: medical management    History of Present Illness:  Patient is a 48 y.o. female with PMH anxiety, HTN, T2DM (diet controlled), Hodgkin's lymphoma stage IIIA , GERD, PE on Xarelto, HLD who presents s/p left shoulder arthoplasty on 9/28 with Ortho. Doing well post-op with no complaints. States that she injured her shoulder after playing the [employer] console. Recent CT shoulder with Severe left glenohumeral osteoarthritis. Denies any smoking hx or illicit drug use. Drinks socially. Sees PCP and Heme Onc at .     PTA Medications   Medication Sig    acetaminophen-codeine 300-30mg (TYLENOL #3) 300-30 mg Tab Take 1 tablet by mouth.    albuterol (PROVENTIL/VENTOLIN HFA) 90 mcg/actuation inhaler INHALE 2 PUFFS FOUR TIMES DAILY AS NEEDED FOR SHORTNESS OF BREATH    amitriptyline (ELAVIL) 50 MG tablet Take 1 tablet by mouth.    atorvastatin (LIPITOR) 10 MG tablet Take 10 mg by mouth nightly.    cholecalciferol, vitamin D3, 125 mcg (5,000 unit) Tab Take 5,000 Units by mouth once daily.    doxepin (SINEQUAN) 10 MG capsule TAKE 1 CAPSULE BY MOUTH AT BEDTIME AS NEEDED FOR SLEEP OR ITCHINESS    enoxaparin (LOVENOX) 120 mg/0.8 mL Syrg     famotidine (PEPCID) 20 MG tablet Take by mouth.    LINZESS 290 mcg Cap capsule Take 290 mcg by mouth every morning.    losartan (COZAAR) 50 MG tablet Take 50 mg by mouth.    NURTEC 75 mg odt Take by mouth.    pantoprazole (PROTONIX) 40 MG tablet Take 40 mg by mouth.    topiramate (TOPAMAX) 100 MG tablet Take 100 mg by mouth 2 (two) times daily.    ZTLIDO 1.8 % PtMd Apply 1 patch topically daily as needed.    ACCU-CHEK FASTCLIX Misc     BINAXNOW COVID-19 AG SELF TEST Kit TEST AS DIRECTED TODAY    butalbital-acetaminophen-caffeine -40 mg (FIORICET, ESGIC) -40 mg per tablet     citalopram (CELEXA) 40 MG tablet Take 40 mg by mouth.    clonazePAM (KLONOPIN) 0.5 MG tablet clonazepam 0.5 mg tablet    LIDOCAINE  VISCOUS 2 % solution Take 10 mLs by mouth every 6 (six) hours as needed.    ONETOUCH ULTRA BLUE TEST STRIP Strp     ONETOUCH ULTRA2 kit     XARELTO 20 mg Tab     [DISCONTINUED] meloxicam (MOBIC) 7.5 MG tablet Take 1 tablet (7.5 mg total) by mouth once daily.    [DISCONTINUED] oxyCODONE-acetaminophen (PERCOCET) 5-325 mg per tablet Take 1 tablet by mouth every 4 (four) hours as needed for Pain.       Review of patient's allergies indicates:   Allergen Reactions    Naproxen      Hair falls out       Past Medical History:   Diagnosis Date    Diabetes mellitus     Hodgkin lymphoma     dx- Aug 15, 2018, chemo 9/25/18-2/26/2019, due to start radiation    Hyperlipidemia     Hypertension     Pulmonary embolism     dx June 26, 2018, on lovenox     Past Surgical History:   Procedure Laterality Date    ARTHROSCOPIC DEBRIDEMENT OF SHOULDER Left 1/16/2020    Procedure: DEBRIDEMENT, SHOULDER, ARTHROSCOPIC EXTENSIVE ARTHROSCOPIC RELEASE CAPSULAR RELEASE;  Surgeon: Ariel Valladares MD;  Location: [facility];  Service: Orthopedics;  Laterality: Left;  LEFT SHOULDER EXTENSIVE ARTHROSCOPIC DEBRIDEMENT   GETA + INTERSCALENE  VIDEO/confirmed 1/15/2020 1230 KB Christofer    BREAST BIOPSY Right     stereo rt 4/2018    CHOLECYSTECTOMY  12/1998    UMBILICAL HERNIA REPAIR  05/2001    mesh placed     Family History   Problem Relation Age of Onset    Breast cancer Mother     Breast cancer Maternal Cousin     Breast cancer Maternal Aunt     Colon cancer Neg Hx     Ovarian cancer Neg Hx      Social History     Tobacco Use    Smoking status: Never    Smokeless tobacco: Never   Substance Use Topics    Alcohol use: Yes     Comment: Socially    Drug use: No         OBJECTIVE:     Vital Signs (Most Recent)  Temp: 98.1 °F (36.7 °C) (09/28/23 1350)  Pulse: 87 (09/28/23 1555)  Resp: 19 (09/28/23 1625)  BP: 112/73 (09/28/23 1545)  SpO2: 99 % (09/28/23 1555)    Physical Exam:  Gen- WNWD, NAD  CV- RRR, no LE edema  Resp- breathing comfortably on RA, CTAB  Abd-  "soft, NTND  Skin- Surgical incision dressing cdi  Psych- logical thought process, answers questions appropriately     Laboratory  Lab Results   Component Value Date    WBC 4.82 01/16/2020    HGB 11.6 (L) 11/12/2022    HCT 34.1 (L) 11/12/2022    MCV 90 01/16/2020     01/16/2020      CMP  Sodium   Date Value Ref Range Status   09/13/2023 139 136 - 145 mmol/L Final     Potassium   Date Value Ref Range Status   09/13/2023 3.8 3.5 - 5.1 mmol/L Final     Chloride   Date Value Ref Range Status   09/13/2023 107 95 - 110 mmol/L Final     CO2   Date Value Ref Range Status   09/13/2023 25 23 - 29 mmol/L Final     Glucose   Date Value Ref Range Status   09/13/2023 115 (H) 70 - 110 mg/dL Final     BUN   Date Value Ref Range Status   09/13/2023 18 6 - 20 mg/dL Final     Creatinine   Date Value Ref Range Status   09/13/2023 1.0 0.5 - 1.4 mg/dL Final     Calcium   Date Value Ref Range Status   09/13/2023 9.7 8.7 - 10.5 mg/dL Final     Anion Gap   Date Value Ref Range Status   09/13/2023 7 (L) 8 - 16 mmol/L Final     eGFR    Date Value Ref Range Status   11/08/2022 77 (L) >=90 mL/min Final     Comment:     Calculation based on the Chronic Kidney Disease Epidemiology Collaboration (CKD-EPI) equation refit without adjustment for race.        No results found for: "INR", "PROTIME"   No results for input(s): "CPK", "CPKMB", "TROPONINI", "MB" in the last 168 hours.   No results for input(s): "TROPONINI", "CKTOTAL", "CKMB" in the last 168 hours.    BNP  No results for input(s): "BNP" in the last 168 hours.    Urinalysis  No results for input(s): "COLORU", "CLARITYU", "SPECGRAV", "PHUR", "PROTEINUA", "GLUCOSEU", "BILIRUBINCON", "BLOODU", "WBCU", "RBCU", "BACTERIA", "MUCUS", "NITRITE", "LEUKOCYTESUR", "UROBILINOGEN", "HYALINECASTS" in the last 24 hours.   LAST HbA1c  Lab Results   Component Value Date    HGBA1C 5.1 09/28/2023           ASSESSMENT/PLAN:   Patient is a 48 y.o. female with PMH anxiety, HTN, T2DM (diet " controlled), Hodgkin's lymphoma stage IIIA , GERD, PE on Xarelto, HLD who presents s/p left shoulder arthoplasty on 9/28 with Ortho. [facility] consulted for medical management while inpatient.    Plan:   #HTN  -On Losartan 50 mg at home, given normotensive here will hold for now. Can re-start tomorrow if patient becomes hypertensive    #T2DM  -Diet controlled. Not on any meds for this. A1c 5.1 on admit  -SSI   -Goal 140-180 while inpatient    #Anxiety  -Continue home meds Amitriptyline 50 mg nightly and Celexa 40 mg daily    #HLD  -Continue home Atorvastatin 10 mg    #Hx of PE on Xarelto  #Hodgkin's Lymphoma  -Continue home Xarelto once hemodynamically stable 9/29    Rest of care per Ortho. Family medicine will continue to follow. Please contact us if you have any further questions. Thank you for the consult.     Chastity Chin MD  [facility], PGY-2  09/28/2023

## 2023-09-28 NOTE — PLAN OF CARE
Patient has met PACU discharge criteria, VSS on room air, pain well controlled. Xray at bedside to take post op left shoulder xray.  Xray completed. Released from PACU by Anesthesia MD.

## 2023-09-28 NOTE — TRANSFER OF CARE
"Anesthesia Transfer of Care Note    Patient: Rosaura Mitchell    Procedure(s) Performed: Procedure(s) (LRB):  ARTHROPLASTY, SHOULDER (Left)    Patient location: PACU    Anesthesia Type: general    Transport from OR: Transported from OR on 6-10 L/min O2 by face mask with adequate spontaneous ventilation    Post pain: adequate analgesia    Post assessment: no apparent anesthetic complications    Post vital signs: stable    Level of consciousness: awake    Nausea/Vomiting: no nausea/vomiting    Complications: none    Transfer of care protocol was followed      Last vitals:   Visit Vitals  BP (!) 140/74   Pulse 77   Temp 36.7 °C (98.1 °F) (Skin)   Resp 18   Ht 5' 5" (1.651 m)   Wt 106.6 kg (235 lb)   SpO2 99%   Breastfeeding No   BMI 39.11 kg/m²     "

## 2023-09-29 VITALS
TEMPERATURE: 98 F | BODY MASS INDEX: 41.32 KG/M2 | HEIGHT: 65 IN | DIASTOLIC BLOOD PRESSURE: 64 MMHG | OXYGEN SATURATION: 97 % | WEIGHT: 248 LBS | RESPIRATION RATE: 17 BRPM | HEART RATE: 97 BPM | SYSTOLIC BLOOD PRESSURE: 121 MMHG

## 2023-09-29 PROCEDURE — 94761 N-INVAS EAR/PLS OXIMETRY MLT: CPT

## 2023-09-29 PROCEDURE — 25000003 PHARM REV CODE 250: Performed by: STUDENT IN AN ORGANIZED HEALTH CARE EDUCATION/TRAINING PROGRAM

## 2023-09-29 PROCEDURE — 99900035 HC TECH TIME PER 15 MIN (STAT)

## 2023-09-29 PROCEDURE — 25000003 PHARM REV CODE 250

## 2023-09-29 PROCEDURE — 97162 PT EVAL MOD COMPLEX 30 MIN: CPT

## 2023-09-29 PROCEDURE — 63600175 PHARM REV CODE 636 W HCPCS: Performed by: STUDENT IN AN ORGANIZED HEALTH CARE EDUCATION/TRAINING PROGRAM

## 2023-09-29 PROCEDURE — 97535 SELF CARE MNGMENT TRAINING: CPT

## 2023-09-29 PROCEDURE — 97165 OT EVAL LOW COMPLEX 30 MIN: CPT

## 2023-09-29 RX ORDER — ONDANSETRON 4 MG/1
4 TABLET, FILM COATED ORAL EVERY 6 HOURS PRN
Qty: 28 TABLET | Refills: 0 | Status: SHIPPED | OUTPATIENT
Start: 2023-09-29

## 2023-09-29 RX ORDER — OXYCODONE AND ACETAMINOPHEN 10; 325 MG/1; MG/1
1 TABLET ORAL EVERY 4 HOURS PRN
Qty: 28 TABLET | Refills: 0 | Status: SHIPPED | OUTPATIENT
Start: 2023-09-29 | End: 2023-10-02

## 2023-09-29 RX ORDER — CELECOXIB 100 MG/1
200 CAPSULE ORAL 2 TIMES DAILY
Status: DISCONTINUED | OUTPATIENT
Start: 2023-09-29 | End: 2023-09-29 | Stop reason: HOSPADM

## 2023-09-29 RX ORDER — OXYCODONE HYDROCHLORIDE 5 MG/1
10 TABLET ORAL EVERY 4 HOURS PRN
Status: DISCONTINUED | OUTPATIENT
Start: 2023-09-29 | End: 2023-09-29 | Stop reason: HOSPADM

## 2023-09-29 RX ORDER — OXYCODONE HYDROCHLORIDE 5 MG/1
5 TABLET ORAL EVERY 4 HOURS PRN
Status: DISCONTINUED | OUTPATIENT
Start: 2023-09-29 | End: 2023-09-29 | Stop reason: HOSPADM

## 2023-09-29 RX ADMIN — CITALOPRAM HYDROBROMIDE 40 MG: 20 TABLET ORAL at 09:09

## 2023-09-29 RX ADMIN — ACETAMINOPHEN 1000 MG: 500 TABLET ORAL at 05:09

## 2023-09-29 RX ADMIN — CELECOXIB 100 MG: 100 CAPSULE ORAL at 09:09

## 2023-09-29 RX ADMIN — PREGABALIN 75 MG: 75 CAPSULE ORAL at 09:09

## 2023-09-29 RX ADMIN — METHOCARBAMOL TABLETS 500 MG: 500 TABLET, COATED ORAL at 09:09

## 2023-09-29 RX ADMIN — ACETAMINOPHEN 1000 MG: 500 TABLET ORAL at 11:09

## 2023-09-29 RX ADMIN — FAMOTIDINE 20 MG: 20 TABLET, FILM COATED ORAL at 09:09

## 2023-09-29 RX ADMIN — MORPHINE SULFATE 2 MG: 2 INJECTION, SOLUTION INTRAMUSCULAR; INTRAVENOUS at 11:09

## 2023-09-29 RX ADMIN — OXYCODONE HYDROCHLORIDE 10 MG: 5 TABLET ORAL at 05:09

## 2023-09-29 RX ADMIN — METHOCARBAMOL TABLETS 500 MG: 500 TABLET, COATED ORAL at 01:09

## 2023-09-29 RX ADMIN — SENNOSIDES 8.6 MG: 8.6 TABLET, FILM COATED ORAL at 09:09

## 2023-09-29 RX ADMIN — MORPHINE SULFATE 2 MG: 2 INJECTION, SOLUTION INTRAMUSCULAR; INTRAVENOUS at 07:09

## 2023-09-29 RX ADMIN — OXYCODONE HYDROCHLORIDE 10 MG: 5 TABLET ORAL at 02:09

## 2023-09-29 NOTE — PLAN OF CARE
CM met with pt - lives with two adult children.   Son will transport pt to home at d/c - daughter at bedside.    No dme, no hh prior to admit    [facility].      No HH, no dme ordered         09/29/23 1318   Discharge Planning   Assessment Type Discharge Planning Brief Assessment   Resource/Environmental Concerns none   Support Systems Children  (adult children (2))   Equipment Currently Used at Home none   Current Living Arrangements home   Patient/Family Anticipates Transition to home;home with family   Patient/Family Anticipated Services at Transition none   DME Needed Upon Discharge  none   Discharge Plan A Home;Home with family   Physical Activity   On average, how many days per week do you engage in moderate to strenuous exercise (like a brisk walk)? 7 days   On average, how many minutes do you engage in exercise at this level? 10 min   Financial Resource Strain   How hard is it for you to pay for the very basics like food, housing, medical care, and heating? Not hard   Housing Stability   In the last 12 months, was there a time when you were not able to pay the mortgage or rent on time? N   In the last 12 months, was there a time when you did not have a steady place to sleep or slept in a shelter (including now)? N   Transportation Needs   In the past 12 months, has lack of transportation kept you from medical appointments or from getting medications? no   In the past 12 months, has lack of transportation kept you from meetings, work, or from getting things needed for daily living? No   Food Insecurity   Within the past 12 months, you worried that your food would run out before you got the money to buy more. Never true   Within the past 12 months, the food you bought just didn't last and you didn't have money to get more. Never true   Social Connections   In a typical week, how many times do you talk on the phone with family, friends, or neighbors? More than 3   How often do you get  together with friends or relatives? Twice   How often do you attend Spiritism or Jewish services? More than 4   Do you belong to any clubs or organizations such as Spiritism groups, unions, fraternal or athletic groups, or school groups? Yes   How often do you attend meetings of the clubs or organizations you belong to? More than 4   Alcohol Use   Q1: How often do you have a drink containing alcohol? Monthly or l   Q2: How many drinks containing alcohol do you have on a typical day when you are drinking? 1 or 2   Q3: How often do you have six or more drinks on one occasion? Never

## 2023-09-29 NOTE — PROGRESS NOTES
Consult note  [facility]    Consulted by: Dr. Valladares  Reason for Consult: medical management    History of Present Illness:  Patient is a 48 y.o. female with PMH anxiety, HTN, T2DM (diet controlled), Hodgkin's lymphoma stage IIIA , GERD, PE on Xarelto, HLD who presents s/p left shoulder arthoplasty on 9/28 with Ortho. Doing well post-op with no complaints. States that she injured her shoulder after playing the [employer] console. Recent CT shoulder with Severe left glenohumeral osteoarthritis. Denies any smoking hx or illicit drug use. Drinks socially. Sees PCP and Heme Onc at .     Interval history: No acute events overnight. This AM, patient with no complaints. Pain controlled. BP remains well controlled.    PTA Medications   Medication Sig    acetaminophen-codeine 300-30mg (TYLENOL #3) 300-30 mg Tab Take 1 tablet by mouth.    albuterol (PROVENTIL/VENTOLIN HFA) 90 mcg/actuation inhaler INHALE 2 PUFFS FOUR TIMES DAILY AS NEEDED FOR SHORTNESS OF BREATH    amitriptyline (ELAVIL) 50 MG tablet Take 1 tablet by mouth.    atorvastatin (LIPITOR) 10 MG tablet Take 10 mg by mouth nightly.    cholecalciferol, vitamin D3, 125 mcg (5,000 unit) Tab Take 5,000 Units by mouth once daily.    doxepin (SINEQUAN) 10 MG capsule TAKE 1 CAPSULE BY MOUTH AT BEDTIME AS NEEDED FOR SLEEP OR ITCHINESS    enoxaparin (LOVENOX) 120 mg/0.8 mL Syrg     famotidine (PEPCID) 20 MG tablet Take by mouth.    LINZESS 290 mcg Cap capsule Take 290 mcg by mouth every morning.    losartan (COZAAR) 50 MG tablet Take 50 mg by mouth.    NURTEC 75 mg odt Take by mouth.    pantoprazole (PROTONIX) 40 MG tablet Take 40 mg by mouth.    topiramate (TOPAMAX) 100 MG tablet Take 100 mg by mouth 2 (two) times daily.    ZTLIDO 1.8 % PtMd Apply 1 patch topically daily as needed.    ACCU-CHEK FASTCLIX Misc     BINAXNOW COVID-19 AG SELF TEST Kit TEST AS DIRECTED TODAY    butalbital-acetaminophen-caffeine -40 mg (FIORICET, ESGIC) -40 mg per tablet      citalopram (CELEXA) 40 MG tablet Take 40 mg by mouth.    clonazePAM (KLONOPIN) 0.5 MG tablet clonazepam 0.5 mg tablet    LIDOCAINE VISCOUS 2 % solution Take 10 mLs by mouth every 6 (six) hours as needed.    ONETOUCH ULTRA BLUE TEST STRIP Strp     ONETOUCH ULTRA2 kit     XARELTO 20 mg Tab     [DISCONTINUED] meloxicam (MOBIC) 7.5 MG tablet Take 1 tablet (7.5 mg total) by mouth once daily.    [DISCONTINUED] oxyCODONE-acetaminophen (PERCOCET) 5-325 mg per tablet Take 1 tablet by mouth every 4 (four) hours as needed for Pain.       Review of patient's allergies indicates:   Allergen Reactions    Naproxen      Hair falls out       Past Medical History:   Diagnosis Date    Diabetes mellitus     Hodgkin lymphoma     dx- Aug 15, 2018, chemo 9/25/18-2/26/2019, due to start radiation    Hyperlipidemia     Hypertension     Pulmonary embolism     dx June 26, 2018, on lovenox     Past Surgical History:   Procedure Laterality Date    ARTHROSCOPIC DEBRIDEMENT OF SHOULDER Left 1/16/2020    Procedure: DEBRIDEMENT, SHOULDER, ARTHROSCOPIC EXTENSIVE ARTHROSCOPIC RELEASE CAPSULAR RELEASE;  Surgeon: Ariel Valladares MD;  Location: [facility];  Service: Orthopedics;  Laterality: Left;  LEFT SHOULDER EXTENSIVE ARTHROSCOPIC DEBRIDEMENT   GETA + INTERSCALENE  VIDEO/confirmed 1/15/2020 1230 KB Christofer    BREAST BIOPSY Right     stereo rt 4/2018    CHOLECYSTECTOMY  12/1998    UMBILICAL HERNIA REPAIR  05/2001    mesh placed     Family History   Problem Relation Age of Onset    Breast cancer Mother     Breast cancer Maternal Cousin     Breast cancer Maternal Aunt     Colon cancer Neg Hx     Ovarian cancer Neg Hx      Social History     Tobacco Use    Smoking status: Never    Smokeless tobacco: Never   Substance Use Topics    Alcohol use: Yes     Comment: Socially    Drug use: No         OBJECTIVE:     Vital Signs (Most Recent)  Temp: 98.7 °F (37.1 °C) (09/29/23 0725)  Pulse: 105 (09/29/23 0725)  Resp: 16 (09/29/23 0725)  BP: 107/62 (09/29/23  "0725)  SpO2: (!) 93 % (09/29/23 0737)    Physical Exam:  Gen- WNWD, NAD  CV- RRR, no LE edema  Resp- breathing comfortably on RA, CTAB  Abd- soft, NTND  Skin- Surgical incision dressing cdi  Psych- logical thought process, answers questions appropriately     Laboratory  Lab Results   Component Value Date    WBC 4.82 01/16/2020    HGB 11.6 (L) 11/12/2022    HCT 34.1 (L) 11/12/2022    MCV 90 01/16/2020     01/16/2020      CMP  Sodium   Date Value Ref Range Status   09/13/2023 139 136 - 145 mmol/L Final     Potassium   Date Value Ref Range Status   09/13/2023 3.8 3.5 - 5.1 mmol/L Final     Chloride   Date Value Ref Range Status   09/13/2023 107 95 - 110 mmol/L Final     CO2   Date Value Ref Range Status   09/13/2023 25 23 - 29 mmol/L Final     Glucose   Date Value Ref Range Status   09/13/2023 115 (H) 70 - 110 mg/dL Final     BUN   Date Value Ref Range Status   09/13/2023 18 6 - 20 mg/dL Final     Creatinine   Date Value Ref Range Status   09/13/2023 1.0 0.5 - 1.4 mg/dL Final     Calcium   Date Value Ref Range Status   09/13/2023 9.7 8.7 - 10.5 mg/dL Final     Anion Gap   Date Value Ref Range Status   09/13/2023 7 (L) 8 - 16 mmol/L Final     eGFR    Date Value Ref Range Status   11/08/2022 77 (L) >=90 mL/min Final     Comment:     Calculation based on the Chronic Kidney Disease Epidemiology Collaboration (CKD-EPI) equation refit without adjustment for race.        No results found for: "INR", "PROTIME"   No results for input(s): "CPK", "CPKMB", "TROPONINI", "MB" in the last 168 hours.   No results for input(s): "TROPONINI", "CKTOTAL", "CKMB" in the last 168 hours.    BNP  No results for input(s): "BNP" in the last 168 hours.    Urinalysis  No results for input(s): "COLORU", "CLARITYU", "SPECGRAV", "PHUR", "PROTEINUA", "GLUCOSEU", "BILIRUBINCON", "BLOODU", "WBCU", "RBCU", "BACTERIA", "MUCUS", "NITRITE", "LEUKOCYTESUR", "UROBILINOGEN", "HYALINECASTS" in the last 24 hours.   LAST HbA1c  Lab Results "   Component Value Date    HGBA1C 5.1 09/28/2023           ASSESSMENT/PLAN:   Patient is a 48 y.o. female with PMH anxiety, HTN, T2DM (diet controlled), Hodgkin's lymphoma stage IIIA , GERD, PE on Xarelto, HLD who presents s/p left shoulder arthoplasty on 9/28 with Ortho. [facility] consulted for medical management while inpatient.    Plan:   #HTN  -On Losartan 50 mg at home, given normotensive here will hold for now. Can re-start on discharge    #T2DM  -Diet controlled. Not on any meds for this. A1c 5.1 on admit  -SSI   -Goal 140-180 while inpatient    #Anxiety  -Continue home meds Amitriptyline 50 mg nightly and Celexa 40 mg daily    #HLD  -Continue home Atorvastatin 10 mg    #Hx of PE on Xarelto  #Hodgkin's Lymphoma  -Continue home Xarelto on discharge 9/29 if hemodynamically stable    Rest of care per Ortho. Family medicine will sign off now. Please contact us if you have any further questions. Thank you for the consult.     Chastity Chin MD  [facility], PGY-2  09/29/2023

## 2023-09-29 NOTE — PROGRESS NOTES
Patient AAOx4, NAD noted, VSS.  Family at the bedside.  Bedside delivery of medications complete.  IV removed. AVS given to patient.  VN to review. Safety maintained.

## 2023-09-29 NOTE — PT/OT/SLP EVAL
"Physical Therapy Evaluation    Patient Name:  Rosaura Mitchell   MRN:  [id number]    Recommendations:     Discharge Recommendations: home, other (see comments) (Outpatient Physical Therapy once cleared by MD)   Discharge Equipment Recommendations: none and pain  Barriers to discharge: None    Assessment:     Rosaura Mitchell is a 48 y.o. female admitted with a medical diagnosis of Glenohumeral arthritis, left s/p Total Shoulder arthroplasty on 9/28.  She presents with the following impairments/functional limitations: pain, orthopedic precautions, decreased ROM, impaired balance, decreased upper extremity function, impaired self care skills     Patient seen for physical therapy evaluation on this date.  Patient is at independent/modified independent with transfers and gait.  Patient educated on NWB of L UE, use of a sling, and stair and transfer safety.  Patient and daughter verbalized good understanding.  Patient with increased complaints of pain, educated on use of ice packs at home as well as positioning in the bed/chair.  Patient to attend outpatient physical therapy when cleared by MD.  No DME needs at this time..    Rehab Prognosis: Good; patient would benefit from acute skilled PT services to address these deficits and reach maximum level of function.    Recent Surgery: Procedure(s) (LRB):  ARTHROPLASTY, SHOULDER (Left) 1 Day Post-Op    Plan:     During this hospitalization, patient to be seen   to address the identified rehab impairments via   and progress toward the following goals:    Plan of Care Expires:  09/29/23    Subjective     Chief Complaint: "I am in a lot of pain"  Patient/Family Comments/goals: To return to PLOF  Pain/Comfort:  Pain Rating 1: 9/10  Location - Side 1: Left  Location - Orientation 1: generalized  Location 1: arm  Pain Addressed 1: Pre-medicate for activity, Reposition, Distraction, Cessation of Activity  Pain Rating Post-Intervention 1: 9/10    Patients cultural, spiritual, Restoration " conflicts given the current situation: no    Living Environment:  Patient lives with 2 children (24, 29 yo) in 2nd floor apartment, ~12 steps with L railing, T/S in bathroom with shower chair.  Prior to admission, patients level of function was Independent, works full time as a , + Driving.  Equipment used at home: cane, straight, shower chair.  DME owned (not currently used): single point cane.  Upon discharge, patient will have assistance from family.    Objective:     Communicated with nurse Bria prior to session.  Patient found supine with bed alarm, peripheral IV  upon PT entry to room.    General Precautions: Standard, fall  Orthopedic Precautions:LUE non weight bearing   Braces: UE Sling (and shoulder immobilizer)  Respiratory Status: Room air    Exams:  Gross Motor Coordination:  WFL  Postural Exam:  Patient presented with the following abnormalities:    -       Rounded shoulders  -       Forward head  Sensation:    -       Intact  light/touch B LE's, reports no numbness or tingling  Skin Integrity/Edema:      -       Skin integrity: L Shoulder Surgical Incision with dressing intact  -       Edema: Moderate L UE  RLE ROM: WFL  RLE Strength: WFL  LLE ROM: WFL  LLE Strength: WFL  Moves L hand and fingers at WNL    Functional Mobility:  Bed Mobility:     Rolling Right: modified independence  Scooting: modified independence  Supine to Sit: modified independence  Sit to Supine: modified independence  Transfers:     Sit to Stand:  modified independence with no AD  Toilet Transfer: modified independence with  no AD  using  Step Transfer  Gait: with no AD x ~100', slow pace, no LOB, Mod I  Balance: Seated:  no LOB sitting   Standing:  no AD, Modified Independent      AM-PAC 6 CLICK MOBILITY  Total Score:21       Treatment & Education:  Patient educated on role of physical therapy and goals in inpatient setting.  Patient educated on NWB status and safety with transfers and gait.  Patient educated  on use of L railing for stairs by using sidestep technique, daughter behind patient on way up and in front on way down stairs.  Patient to attend outpatient therapy once cleared by MD.  No DME needs at this time.     Patient left HOB elevated with all lines intact, call button in reach, bed alarm on, and nurse notified.    GOALS:   Multidisciplinary Problems       Physical Therapy Goals          Problem: Physical Therapy    Goal Priority Disciplines Outcome Goal Variances Interventions   Physical Therapy Goal     PT, PT/OT Adequate for Care Transition     Description: Goals to be met by: 2023     Patient will increase functional independence with mobility by performin. Sit to stand transfer with Modified [place]:  GOAL MET  2. Gait  x 150 feet with Modified [place] using No Assistive Device:  GOAL MET                         History:     Past Medical History:   Diagnosis Date    Diabetes mellitus     Hodgkin lymphoma     dx- Aug 15, 2018, chemo , due to start radiation    Hyperlipidemia     Hypertension     Pulmonary embolism     dx 2018, on lovenox       Past Surgical History:   Procedure Laterality Date    ARTHROSCOPIC DEBRIDEMENT OF SHOULDER Left 2020    Procedure: DEBRIDEMENT, SHOULDER, ARTHROSCOPIC EXTENSIVE ARTHROSCOPIC RELEASE CAPSULAR RELEASE;  Surgeon: Ariel Valladares MD;  Location: [facility];  Service: Orthopedics;  Laterality: Left;  LEFT SHOULDER EXTENSIVE ARTHROSCOPIC DEBRIDEMENT   GETA + INTERSCALENE  VIDEO/confirmed 1/15/2020 1230 KB Christofer    BREAST BIOPSY Right     stereo rt 2018    CHOLECYSTECTOMY  1998    UMBILICAL HERNIA REPAIR  2001    mesh placed       Time Tracking:     PT Received On: 23  PT Start Time: 0954     PT Stop Time: 1015  PT Total Time (min): 21 min Co-evaluation with OT    Billable Minutes: Evaluation 10      2023

## 2023-09-29 NOTE — PROGRESS NOTES
Future Appointments   Date Time Provider Department Center   10/18/2023 10:00 AM Guerita Najera MD [facility]t

## 2023-09-29 NOTE — PT/OT/SLP EVAL
Occupational Therapy   Evaluation    Name: Rosaura Mitchell  MRN: [id number]  Admitting Diagnosis: Glenohumeral arthritis, left  Recent Surgery: Procedure(s) (LRB):  ARTHROPLASTY, SHOULDER (Left) 1 Day Post-Op    Recommendations:     Discharge Recommendations: other (see comments), home (OP therapy when cleared by MD & increased assistance from family)  Discharge Equipment Recommendations:  none  Barriers to discharge:  None    Assessment:     Rosaura Mitchell is a 48 y.o. female with a medical diagnosis of Glenohumeral arthritis, left.  She presents with The primary encounter diagnosis was Shoulder pain, unspecified chronicity, unspecified laterality. A diagnosis of Glenohumeral arthritis, left was also pertinent to this visit. Performance deficits affecting function: pain, orthopedic precautions, decreased ROM, decreased upper extremity function, impaired self care skills.      Pt participating in initial OT/PT evaluations this date. Pt educated on adaptive dsg post surgery, home safety, car/toilet/shower/tub t/fs, use of DME for safety ADLs. Pt educated on donning/doffing of UE sling & NWB status. Pt verbalizing good understanding. Pt toileting with Sup. Increased c/o pain, pt educated on positioning for pain management & nsg notified. Pt to attend OP therapy when cleared by MD. No DME needs at this time.     Rehab Prognosis: Good; patient would benefit from acute skilled OT services to address these deficits and reach maximum level of function.       Plan:     Plan of Care Expires: 09/29/23  Plan of Care Reviewed with: patient, daughter    Subjective     Chief Complaint: LUE pain  Patient/Family Comments/goals: Return home    Occupational Profile:  Living Environment: Pt lives w/2 adult children &  mother, 2nd floor apt, 12 [address], LHR, tub/shower combo w/SC  Previous level of function: Not using SPC, drives, works as teacher for   Equipment Used at Home: cane, straight, shower chair  Assistance upon  "Discharge: Family, pt reports mother will be home with her     Pain/Comfort:  Pain Rating 1: 9/10  Location - Side 1: Left    Patients cultural, spiritual, Episcopal conflicts given the current situation: no    Objective:     Communicated with: nsg prior to session.  Patient found HOB elevated with bed alarm upon OT entry to room.    General Precautions: Standard, fall  Orthopedic Precautions: LUE non weight bearing  Braces: N/A, UE Sling ("shoulder immobilizer")  Respiratory Status: Room air    Occupational Performance:    Bed Mobility:    Patient completed Scooting/Bridging with supervision  Patient completed Supine to Sit with stand by assistance  Patient completed Sit to Supine with stand by assistance    Functional Mobility/Transfers:  Patient completed Sit <> Stand Transfer with supervision and stand by assistance  with  no assistive device   Functional Mobility: Pt performing functional mobility in room with Sup/SBA & no AD; no LOB    Activities of Daily Living:  Lower Body Dressing: stand by assistance to titi shorts using RUE while seated  Toileting: stand by assistance seated on toilet for hygiene    Cognitive/Visual Perceptual:  Cognitive/Psychosocial Skills:     -       Oriented to: Person, Place, Time, and Situation   -       Follows Commands/attention:Follows multistep  commands  -       Mood/Affect/Coping skills/emotional control: Cooperative and Pleasant    Physical Exam:  Sensation:    -       Intact  light/touch BUE  Upper Extremity Range of Motion:     -       Right Upper Extremity: WFL  Upper Extremity Strength:    -       Right Upper Extremity: 3+ to 4/5   Strength:    -       Right Upper Extremity: WFL  -       Left Upper Extremity: WFL    AMPAC 6 Click ADL:  AMPAC Total Score: 23    Treatment & Education:  Pt participating in initial OT/PT evaluations this date.   Pt educated on adaptive dsg post surgery, home safety, car/toilet/shower/tub t/fs, use of DME for safety ADLs.   Pt educated " on donning/doffing of UE sling & NWB status.   Pt verbalizing good understanding.   Pt toileting with Sup. Increased c/o pain, pt educated on positioning for pain management & nsg notified.   Pt to attend OP therapy when cleared by MD. No DME needs at this time.     Patient left HOB elevated with all lines intact, call button in reach, bed alarm on, nsg notified, and daughter present    GOALS:   Multidisciplinary Problems       Occupational Therapy Goals          Problem: Occupational Therapy    Goal Priority Disciplines Outcome Interventions   Occupational Therapy Goal     OT, PT/OT Adequate for Care Transition                        History:     Past Medical History:   Diagnosis Date    Diabetes mellitus     Hodgkin lymphoma     dx- Aug 15, 2018, chemo 9/25/18-2/26/2019, due to start radiation    Hyperlipidemia     Hypertension     Pulmonary embolism     dx June 26, 2018, on lovenox         Past Surgical History:   Procedure Laterality Date    ARTHROSCOPIC DEBRIDEMENT OF SHOULDER Left 1/16/2020    Procedure: DEBRIDEMENT, SHOULDER, ARTHROSCOPIC EXTENSIVE ARTHROSCOPIC RELEASE CAPSULAR RELEASE;  Surgeon: Ariel Valladares MD;  Location: [facility];  Service: Orthopedics;  Laterality: Left;  LEFT SHOULDER EXTENSIVE ARTHROSCOPIC DEBRIDEMENT   GETA + INTERSCALENE  VIDEO/confirmed 1/15/2020 1230 KB Christofer    BREAST BIOPSY Right     stereo rt 4/2018    CHOLECYSTECTOMY  12/1998    UMBILICAL HERNIA REPAIR  05/2001    mesh placed       Time Tracking:     OT Date of Treatment: 09/29/23  OT Start Time: 0955  OT Stop Time: 1015  OT Total Time (min): 20 min    Billable Minutes:Evaluation 10  Self Care/Home Management 10    9/29/2023

## 2023-09-29 NOTE — PROGRESS NOTES
Discharge orders noted. Additional clinical references attached. Patient's discharge instructions given by bedside RN and reviewed via this VN.  Education provided on new medication, diagnosis, and follow-up appointments.  Teach back method used. Patient verbalized understanding. All questions answered. Patient awaiting her daughter for pickup. Bedside nurses updated and to request transportation to [facility] when ready.   09/29/23 1421   AVS Confirmation   Discharge instructions and AVS given to and reviewed with patient and/or significant other. Yes

## 2023-09-29 NOTE — PLAN OF CARE
Problem: Adult Inpatient Plan of Care  Goal: Plan of Care Review  Outcome: Ongoing, Progressing  Goal: Patient-Specific Goal (Individualized)  Outcome: Ongoing, Progressing  Goal: Absence of Hospital-Acquired Illness or Injury  Outcome: Ongoing, Progressing  Goal: Optimal Comfort and Wellbeing  Outcome: Ongoing, Progressing  Goal: Readiness for Transition of Care  Outcome: Ongoing, Progressing     Problem: Diabetes Comorbidity  Goal: Blood Glucose Level Within Targeted Range  Outcome: Ongoing, Progressing     Problem: Fall Injury Risk  Goal: Absence of Fall and Fall-Related Injury  Outcome: Ongoing, Progressing     Problem: Adjustment to Surgery (Shoulder Arthroplasty)  Goal: Optimal Coping  Outcome: Ongoing, Progressing     Problem: Bleeding (Shoulder Arthroplasty)  Goal: Absence of Bleeding  Outcome: Ongoing, Progressing     Problem: Bowel Motility Impaired (Shoulder Arthroplasty)  Goal: Effective Bowel Elimination  Outcome: Ongoing, Progressing     Problem: Fluid and Electrolyte Imbalance (Shoulder Arthroplasty)  Goal: Fluid and Electrolyte Balance  Outcome: Ongoing, Progressing     Problem: Functional Ability Impaired (Shoulder Arthroplasty)  Goal: Optimal Functional Ability  Outcome: Ongoing, Progressing     Problem: Infection (Shoulder Arthroplasty)  Goal: Absence of Infection Signs and Symptoms  Outcome: Ongoing, Progressing     Problem: Neurovascular Compromise (Shoulder Arthroplasty)  Goal: Intact Neurovascular Status  Outcome: Ongoing, Progressing     Problem: Ongoing Anesthesia Effects (Shoulder Arthroplasty)  Goal: Anesthesia/Sedation Recovery  Outcome: Ongoing, Progressing     Problem: Pain (Shoulder Arthroplasty)  Goal: Acceptable Pain Control  Outcome: Ongoing, Progressing     Problem: Postoperative Nausea and Vomiting (Shoulder Arthroplasty)  Goal: Nausea and Vomiting Relief  Outcome: Ongoing, Progressing     Problem: Postoperative Urinary Retention (Shoulder Arthroplasty)  Goal: Effective Urinary  Elimination  Outcome: Ongoing, Progressing     Problem: Bariatric Environmental Safety  Goal: Safety Maintained with Care  Outcome: Ongoing, Progressing

## 2023-09-29 NOTE — DISCHARGE SUMMARY
[facility]  Discharge Note  Short Stay    Procedure(s) (LRB):  ARTHROPLASTY, SHOULDER (Left)      OUTCOME: Patient tolerated treatment/procedure well without complication and is now ready for discharge.    DISPOSITION: Home or Self Care    FINAL DIAGNOSIS:  Glenohumeral arthritis, left    FOLLOWUP: In clinic    DISCHARGE INSTRUCTIONS:    Discharge Procedure Orders   Weight bearing restrictions (specify):   Order Comments: Non weight bearing left upper extremity         Clinical Reference Documents Added to Patient Instructions         Document    SHOULDER ARTHROSCOPY DISCHARGE INSTRUCTIONS (ENGLISH)    SHOULDER PAIN DISCHARGE INSTRUCTIONS (ENGLISH)    SHOULDER REHAB EXERCISES, PHASE 1 (ENGLISH)    SHOULDER REHAB EXERCISES, PHASE 2 (ENGLISH)            TIME SPENT ON DISCHARGE: 15 minutes

## 2023-09-29 NOTE — PLAN OF CARE
Patient seen for physical therapy evaluation on this date.  Patient is at independent/modified independent with transfers and gait.  Patient educated on NWB of L UE, use of a sling, and stair and transfer safety.  Patient and daughter verbalized good understanding.  Patient with increased complaints of pain, educated on use of ice packs at home as well as positioning in the bed/chair.  Patient to attend outpatient physical therapy when cleared by MD.  No DME needs at this time.    Problem: Physical Therapy  Goal: Physical Therapy Goal  Description: Goals to be met by: 2023     Patient will increase functional independence with mobility by performin. Sit to stand transfer with Modified [place]:  GOAL MET  2. Gait  x 150 feet with Modified [place] using No Assistive Device:  GOAL MET    Outcome: Adequate for Care Transition

## 2023-09-29 NOTE — PROGRESS NOTES
"[facility] Progress Note      Surgeries:  L TSA 9/28    S: Had some pain overnight. Otherwise no acute events. VSS. Denies fever nausea vomiting or chills. Drain with some serosanguinous output.     O:   Vitals:    09/29/23 0549   BP:    Pulse:    Resp: 20   Temp:      No results for input(s): "WBC", "HGB", "HCT", "PLT" in the last 72 hours.  No results for input(s): "NA", "K", "CL", "CO2", "HCO3C", "BUN", "LABCREA", "GLU" in the last 72 hours.  No results for input(s): "ESR", "CRP" in the last 72 hours.    PE:  Gen: A+Ox3, NAD  Card: RRR by RP  Lungs: nonlabored breathing, symmetric chest rise  Abd: S/NT/ND    L shoulder  -dressing clean dry and intact   -2+ radial pulse  -sensation and motor intact to M/R/U and axillary nerves  -comfortable in shoulder immobilizer     A/P:  48F s/p L TSA recovering appropriately     -NWB RUE in shoulder immobilizer  -drain removed today, dressing re-secured  -plan for discharge today       Angelo Leslie MD  [facility]     I have reviewed the notes, assessments, and/or procedures performed by Dr. Leslie, I concur with her/his documentation of Rosaura Mitchell.     "

## 2023-09-29 NOTE — DISCHARGE INSTRUCTIONS
Keep dressing clean, dry and intact until next clinic visit   Sponge baths for bathing   Follow up with Dr. Valladares in 2 weeks

## 2023-09-29 NOTE — PLAN OF CARE
Pt participating in initial OT/PT evaluations this date. Pt educated on adaptive dsg post surgery, home safety, car/toilet/shower/tub t/fs, use of DME for safety ADLs. Pt educated on donning/doffing of UE sling & NWB status. Pt verbalizing good understanding. Pt toileting with Sup. Increased c/o pain, pt educated on positioning for pain management & nsg notified. Pt to attend OP therapy when cleared by MD. No DME needs at this time.     Problem: Occupational Therapy  Goal: Occupational Therapy Goal  9/29/2023 1208 by ZENOBIA Russo, OT  Outcome: Adequate for Care Transition

## 2023-10-02 ENCOUNTER — TELEPHONE (OUTPATIENT)
Dept: ORTHOPEDICS | Facility: CLINIC | Age: 48
End: 2023-10-02
Payer: MEDICAID

## 2023-10-02 RX ORDER — HYDROCODONE BITARTRATE AND ACETAMINOPHEN 10; 325 MG/1; MG/1
1 TABLET ORAL EVERY 6 HOURS PRN
Qty: 28 TABLET | Refills: 0 | Status: SHIPPED | OUTPATIENT
Start: 2023-10-02 | End: 2023-10-03 | Stop reason: SDUPTHER

## 2023-10-02 NOTE — TELEPHONE ENCOUNTER
Spoke with patient to notify a script for Norco has been sent to her pharmacy and once received to stop taking the Percocet.

## 2023-10-02 NOTE — TELEPHONE ENCOUNTER
----- Message from Ariel Valladares MD sent at 10/2/2023 11:50 AM CDT -----  Contact: pt  Let her know that I will switch her to [place]. I will send in to the [facility].  She needs to stop taking the Percocet.   ----- Message -----  From: Miriam Maurer MA  Sent: 10/2/2023   9:22 AM CDT  To: Ariel Valladares MD      ----- Message -----  From: Butch Wade  Sent: 10/2/2023   8:48 AM CDT  To: [facility] Staff    Type: Requesting to speak with nurse        Who Called: PT  Regarding: shoulder pain requesting another medication.  Would the patient rather a call back or a response via [facility]? Call back if no answer please leave a [facility] message.  Best Call Back Number: [phone]  Pharmacy  Information:  [id number] [place] [facility] [address] [place] [address] AT [facility]   Phone: [phone]  Fax:  [phone]

## 2023-10-03 RX ORDER — HYDROCODONE BITARTRATE AND ACETAMINOPHEN 10; 325 MG/1; MG/1
1 TABLET ORAL EVERY 6 HOURS PRN
Qty: 28 TABLET | Refills: 0 | Status: SHIPPED | OUTPATIENT
Start: 2023-10-03 | End: 2023-10-04

## 2023-10-04 ENCOUNTER — PATIENT MESSAGE (OUTPATIENT)
Dept: ORTHOPEDICS | Facility: CLINIC | Age: 48
End: 2023-10-04
Payer: MEDICAID

## 2023-10-04 ENCOUNTER — TELEPHONE (OUTPATIENT)
Dept: ORTHOPEDICS | Facility: CLINIC | Age: 48
End: 2023-10-04
Payer: MEDICAID

## 2023-10-04 RX ORDER — OXYCODONE AND ACETAMINOPHEN 5; 325 MG/1; MG/1
1 TABLET ORAL EVERY 6 HOURS PRN
Qty: 24 TABLET | Refills: 0 | Status: SHIPPED | OUTPATIENT
Start: 2023-10-04 | End: 2023-10-10 | Stop reason: SDUPTHER

## 2023-10-04 NOTE — TELEPHONE ENCOUNTER
----- Message from Ivelisse Alba sent at 10/4/2023  8:40 AM CDT -----  Type:  Needs Medical Advice    Who Called: pt    Would the patient rather a call back or a response via [facility]? call  Best Call Back Number: [phone]    Additional Information: pt is requesting to get a return call from nurse la in regards to a medication for HYDROcodone-acetaminophen (NORCO)  mg per tablet it was sent to [facility] and they no longer take the patients insurance so it needs to be sent to a different pharmacy

## 2023-10-07 ENCOUNTER — PATIENT MESSAGE (OUTPATIENT)
Dept: ORTHOPEDICS | Facility: CLINIC | Age: 48
End: 2023-10-07
Payer: MEDICAID

## 2023-10-09 DIAGNOSIS — M19.012 OSTEOARTHRITIS OF GLENOHUMERAL JOINT, LEFT: Primary | ICD-10-CM

## 2023-10-10 ENCOUNTER — OFFICE VISIT (OUTPATIENT)
Dept: ORTHOPEDICS | Facility: CLINIC | Age: 48
End: 2023-10-10
Payer: MEDICAID

## 2023-10-10 ENCOUNTER — HOSPITAL ENCOUNTER (OUTPATIENT)
Dept: RADIOLOGY | Facility: HOSPITAL | Age: 48
Discharge: HOME | End: 2023-10-10
Attending: ORTHOPAEDIC SURGERY
Payer: MEDICAID

## 2023-10-10 VITALS — WEIGHT: 248 LBS | HEIGHT: 65 IN | BODY MASS INDEX: 41.32 KG/M2

## 2023-10-10 DIAGNOSIS — Z96.612 STATUS POST TOTAL SHOULDER ARTHROPLASTY, LEFT: Primary | ICD-10-CM

## 2023-10-10 DIAGNOSIS — M19.012 OSTEOARTHRITIS OF GLENOHUMERAL JOINT, LEFT: ICD-10-CM

## 2023-10-10 PROCEDURE — 4010F ACE/ARB THERAPY RXD/TAKEN: CPT | Mod: CPTII,,, | Performed by: ORTHOPAEDIC SURGERY

## 2023-10-10 PROCEDURE — 3044F HG A1C LEVEL LT 7.0%: CPT | Mod: CPTII,,, | Performed by: ORTHOPAEDIC SURGERY

## 2023-10-10 PROCEDURE — 99024 PR POST-OP FOLLOW-UP VISIT: ICD-10-PCS | Mod: ,,, | Performed by: ORTHOPAEDIC SURGERY

## 2023-10-10 PROCEDURE — 99999 PR PBB SHADOW E&M-EST. PATIENT-LVL IV: ICD-10-PCS | Mod: PBBFAC,,, | Performed by: ORTHOPAEDIC SURGERY

## 2023-10-10 PROCEDURE — 1160F RVW MEDS BY RX/DR IN RCRD: CPT | Mod: CPTII,,, | Performed by: ORTHOPAEDIC SURGERY

## 2023-10-10 PROCEDURE — 73030 X-RAY EXAM OF SHOULDER: CPT | Mod: TC,PN,LT

## 2023-10-10 PROCEDURE — 73030 XR SHOULDER COMPLETE 2 OR MORE VIEWS LEFT: ICD-10-PCS | Mod: 26,LT,, | Performed by: RADIOLOGY

## 2023-10-10 PROCEDURE — 99999 PR PBB SHADOW E&M-EST. PATIENT-LVL IV: CPT | Mod: PBBFAC,,, | Performed by: ORTHOPAEDIC SURGERY

## 2023-10-10 PROCEDURE — 1160F PR REVIEW ALL MEDS BY PRESCRIBER/CLIN PHARMACIST DOCUMENTED: ICD-10-PCS | Mod: CPTII,,, | Performed by: ORTHOPAEDIC SURGERY

## 2023-10-10 PROCEDURE — 3044F PR MOST RECENT HEMOGLOBIN A1C LEVEL <7.0%: ICD-10-PCS | Mod: CPTII,,, | Performed by: ORTHOPAEDIC SURGERY

## 2023-10-10 PROCEDURE — 73030 X-RAY EXAM OF SHOULDER: CPT | Mod: 26,LT,, | Performed by: RADIOLOGY

## 2023-10-10 PROCEDURE — 99024 POSTOP FOLLOW-UP VISIT: CPT | Mod: ,,, | Performed by: ORTHOPAEDIC SURGERY

## 2023-10-10 PROCEDURE — 1159F MED LIST DOCD IN RCRD: CPT | Mod: CPTII,,, | Performed by: ORTHOPAEDIC SURGERY

## 2023-10-10 PROCEDURE — 99214 OFFICE O/P EST MOD 30 MIN: CPT | Mod: PBBFAC,PN | Performed by: ORTHOPAEDIC SURGERY

## 2023-10-10 PROCEDURE — 1159F PR MEDICATION LIST DOCUMENTED IN MEDICAL RECORD: ICD-10-PCS | Mod: CPTII,,, | Performed by: ORTHOPAEDIC SURGERY

## 2023-10-10 PROCEDURE — 4010F PR ACE/ARB THEARPY RXD/TAKEN: ICD-10-PCS | Mod: CPTII,,, | Performed by: ORTHOPAEDIC SURGERY

## 2023-10-10 RX ORDER — OXYCODONE AND ACETAMINOPHEN 5; 325 MG/1; MG/1
1 TABLET ORAL EVERY 6 HOURS PRN
Qty: 24 TABLET | Refills: 0 | Status: SHIPPED | OUTPATIENT
Start: 2023-10-10 | End: 2023-10-10

## 2023-10-10 RX ORDER — OXYCODONE AND ACETAMINOPHEN 5; 325 MG/1; MG/1
1 TABLET ORAL EVERY 6 HOURS PRN
Qty: 24 TABLET | Refills: 0 | Status: SHIPPED | OUTPATIENT
Start: 2023-10-10 | End: 2023-10-23 | Stop reason: SDUPTHER

## 2023-10-10 NOTE — PROGRESS NOTES
Patient ID:   Rosaura Mitchell is a 48 y.o. female.    Chief Complaint:   Surgical aftercare s/p L TSA    HPI:   Patient is returning today for evaluation of the left shoulder. She reports continued pain. She reports concerns about the shoulder stiffening up. She denies drainage. She denies fever or chills.     Medications:    Current Outpatient Medications:     ACCU-CHEK FASTCLIX Misc, , Disp: , Rfl:     albuterol (PROVENTIL/VENTOLIN HFA) 90 mcg/actuation inhaler, INHALE 2 PUFFS FOUR TIMES DAILY AS NEEDED FOR SHORTNESS OF BREATH, Disp: , Rfl:     amitriptyline (ELAVIL) 50 MG tablet, Take 1 tablet by mouth., Disp: , Rfl:     atorvastatin (LIPITOR) 10 MG tablet, Take 10 mg by mouth nightly., Disp: , Rfl:     BINAXNOW COVID-19 AG SELF TEST Kit, TEST AS DIRECTED TODAY, Disp: , Rfl:     cholecalciferol, vitamin D3, 125 mcg (5,000 unit) Tab, Take 5,000 Units by mouth once daily., Disp: , Rfl:     citalopram (CELEXA) 40 MG tablet, Take 40 mg by mouth., Disp: , Rfl:     doxepin (SINEQUAN) 10 MG capsule, TAKE 1 CAPSULE BY MOUTH AT BEDTIME AS NEEDED FOR SLEEP OR ITCHINESS, Disp: , Rfl:     enoxaparin (LOVENOX) 120 mg/0.8 mL Syrg, , Disp: , Rfl:     famotidine (PEPCID) 20 MG tablet, Take by mouth., Disp: , Rfl:     LINZESS 290 mcg Cap capsule, Take 290 mcg by mouth every morning., Disp: , Rfl:     losartan (COZAAR) 50 MG tablet, Take 50 mg by mouth., Disp: , Rfl:     NURTEC 75 mg odt, Take by mouth., Disp: , Rfl:     ondansetron (ZOFRAN) 4 MG tablet, Take 1 tablet (4 mg total) by mouth every 6 (six) hours as needed for Nausea., Disp: 28 tablet, Rfl: 0    ONETOUCH ULTRA BLUE TEST STRIP Strp, , Disp: , Rfl:     ONETOUCH ULTRA2 kit, , Disp: , Rfl:     pantoprazole (PROTONIX) 40 MG tablet, Take 40 mg by mouth., Disp: , Rfl:     topiramate (TOPAMAX) 100 MG tablet, Take 100 mg by mouth 2 (two) times daily., Disp: , Rfl:     XARELTO 20 mg Tab, , Disp: , Rfl:     ZTLIDO 1.8 % PtMd, Apply 1 patch topically daily as needed., Disp: ,  "Rfl:     oxyCODONE-acetaminophen (PERCOCET) 5-325 mg per tablet, Take 1 tablet by mouth every 6 (six) hours as needed for Pain., Disp: 24 tablet, Rfl: 0  No current facility-administered medications for this visit.    Facility-Administered Medications Ordered in Other Visits:     cefazolin (ANCEF) 2 gram in dextrose 5% 50 mL IVPB (premix), 2 g, Intravenous, 30 Min Pre-Op, Ariel Valladares MD, 2 g at 01/16/20 1613    Allergies:  Review of patient's allergies indicates:   Allergen Reactions    Naproxen      Hair falls out       Vitals:  Ht 5' 5" (1.651 m)   Wt 112.5 kg (248 lb 0.3 oz)   BMI 41.27 kg/m²     Physical Examination:  Ortho Exam   Left shoulder exam:  Wound C/D/I.  ROM: passive elevation 100, ER neutral  Light touch intact in the Ax/MC/R/U/M.   Intact deltoid, biceps, triceps, WE, WF, FE, FF, Int  2+ radial pulse at the wrist    Imaging Studies:  I have ordered and independently reviewed the following imaging studies performed at [facility] today    X-Ray Shoulder 2 or More Views Left  Narrative: EXAMINATION:  XR SHOULDER COMPLETE 2 OR MORE VIEWS LEFT    CLINICAL HISTORY:  Primary osteoarthritis, left shoulder    TECHNIQUE:  Two or three views of the left shoulder were performed.    COMPARISON:  09/28/2023    FINDINGS:  Recent shoulder arthroplasty.  Humeral component is well located with respect to the glenoid.  I see no periprosthetic fracture or lucency.  Impression: Please see above.    Electronically signed by: Shameka Hewitt  Date:    10/10/2023  Time:    10:43        Assessment:  1. Status post total shoulder arthroplasty, left      Plan:  Patient will start formal supervised PT/OT (pendulums, passive elevation, ER to neutral). Follow-up in 4 weeks with a new x-ray of the left shoulder. Refill pain meds.     Orders Placed This Encounter    Ambulatory referral/consult to Physical/Occupational Therapy    oxyCODONE-acetaminophen (PERCOCET) 5-325 mg per tablet     No follow-ups on file.          "

## 2023-10-11 ENCOUNTER — CLINICAL SUPPORT (OUTPATIENT)
Dept: REHABILITATION | Facility: HOSPITAL | Age: 48
End: 2023-10-11
Payer: MEDICAID

## 2023-10-11 DIAGNOSIS — Z96.612 STATUS POST TOTAL SHOULDER ARTHROPLASTY, LEFT: ICD-10-CM

## 2023-10-11 DIAGNOSIS — M25.512 CHRONIC LEFT SHOULDER PAIN: ICD-10-CM

## 2023-10-11 DIAGNOSIS — G89.29 CHRONIC LEFT SHOULDER PAIN: ICD-10-CM

## 2023-10-11 DIAGNOSIS — M25.612 STIFFNESS OF LEFT SHOULDER JOINT: ICD-10-CM

## 2023-10-11 PROCEDURE — 97161 PT EVAL LOW COMPLEX 20 MIN: CPT | Mod: PO

## 2023-10-11 PROCEDURE — 97140 MANUAL THERAPY 1/> REGIONS: CPT | Mod: PO

## 2023-10-11 PROCEDURE — 97112 NEUROMUSCULAR REEDUCATION: CPT | Mod: PO

## 2023-10-11 PROCEDURE — 97110 THERAPEUTIC EXERCISES: CPT | Mod: PO

## 2023-10-11 NOTE — PROGRESS NOTES
[facility]   Physical Therapy Initial Evaluation      Name: Rosaura Mitchell  Clinic Number: [id number]    Therapy Diagnosis:   Encounter Diagnoses   Name Primary?    Status post total shoulder arthroplasty, left     Chronic left shoulder pain     Stiffness of left shoulder joint         Physician: Ariel Valladares MD    Physician Orders: PT Eval and Treat   Medical Diagnosis from Referral: Status post total shoulder arthroplasty, left  Evaluation Date: 10/11/2023  Authorization Period Expiration: 10/09/2024  Plan of Care Expiration: 12/11/2023  Progress Note Due: 11/11/2023  Date of Surgery: 9/28/2023  Visit # / Visits authorized: 1/ 1   FOTO: 1/ 3    Precautions: Standard, Diabetes, and Hodgkin lymphoma and L total shoulder 9/28/2023       Time In: 3:35  Time Out: 4:30  Total Billable Time: 55 minutes    Subjective     Date of onset: L shoulder surgery 9/28/2023    History of current condition - Rosaura reports: Pt injured her L shoulder 10 years ago.  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 She also had a fall in July of 2023 re-injuring her L shoulder.  Pt has had physical therapy and injections through the past 10 years. Pt is R handed.    Falls: one in July of 2023 re-injuring her L shoulder    Imaging: X-Rays:     Prior Therapy: yes  Social History: pt lives lives in a second floor apt with her Mom and t grown children.   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 Pt was driving with her R hand  Current Level of Function: 2 weeks s/p L total shoulder in sling     Pain:  Current 6/10, worst 10/10, best 3/10   Location: left shoulder    Description: Aching  Aggravating Factors: positioning  Easing Factors: pain medication, ice, and stabilizing her L UE    Patients goals: to get as functional as possible     Medical History:   Past Medical  History:   Diagnosis Date    Diabetes mellitus     Hodgkin lymphoma     dx- Aug 15, 2018, chemo 9/25/18-2/26/2019, due to start radiation    Hyperlipidemia     Hypertension     Pulmonary embolism     dx June 26, 2018, on lovenox       Surgical History:   Rosaura Mitchell  has a past surgical history that includes Cholecystectomy (12/1998); Umbilical hernia repair (05/2001); Breast biopsy (Right); Arthroscopic debridement of shoulder (Left, 1/16/2020); and Arthroplasty of shoulder (Left, 9/28/2023).    Medications:   Rosaura has a current medication list which includes the following prescription(s): accu-chek fastclix lancing dev, albuterol, amitriptyline, atorvastatin, binaxnow covid-19 ag self test, cholecalciferol (vitamin d3), citalopram, doxepin, enoxaparin, famotidine, linzess, losartan, nurtec, ondansetron, onetouch ultra blue test strip, onetouch ultra2 meter, oxycodone-acetaminophen, pantoprazole, topiramate, xarelto, and ztlido, and the following Facility-Administered Medications: cefazolin 2 g/50ml dextrose ivpb.    Allergies:   Review of patient's allergies indicates:   Allergen Reactions    Naproxen      Hair falls out        Objective      Observation: [facility] indepen[facility]tly with a post-op sling on the L UE    Posture: protracted L shoulder girdle in a post-op sling      Passive Range of Motion:   Shoulder Left Right   Flexion 55 nt   Abduction nt nt   ER at 0 -10 nt   ER at 90 nt nt   IR nt nt      Active Range of Motion:   Shoulder Left Right   Flexion nt nt   Abduction n t   ER at 0 nt nt   ER at 90 nt nt   IR nt nt     Upper Extremity Strength   (L) UE (R) UE   Shoulder flexion: nt nt   Shoulder Abduction: nt nt   Shoulder ER nt nt   Shoulder IR nt nt   Lower Trap nt nt   Middle Trap nt nt   Rhomboids nt nt       Joint Mobility: L GH joint hypomobile    Palpation: tender over anterior L shoulder incision    Sensation: intact    Flexibility: NT    Intake Outcome Measure for FOTO  Shoulder Survey    Therapist reviewed FOTO scores for Rosaura Mitchell on 10/11/2023.   FOTO report - see Media section or FOTO account episode details.    Intake Score: 48%         Treatment     Total Treatment time (time-based codes) separate from Evaluation: 24 minutes     Rosaura received the treatments listed below:      therapeutic exercises to develop strength, endurance, ROM, flexibility, posture, and core stabilization for 08 minutes including:  L Wrist flexion and extension x 10  L elbow flexion and extension x 10  Gripping x 10        manual therapy techniques: Joint mobilizations, Soft tissue Mobilization, and ROM were applied to the: L shoulder for 08 minutes, including:  Passive ROM into L shoulder flexion in a scapular plain   Passive ROM into L shoulder ER    neuromuscular re-education activities to improve: Coordination, Kinesthetic, and Posture for 08 minutes. The following activities were included:  Scapular retractions  Shoulder shrugs    Patient Education and Home Exercises     Education provided:   - Role of Physical Therapy  - Plan of Care    Written Home Exercises Provided: yes. Exercises were reviewed and Rosaura was able to demonstrate them prior to the end of the session.  Rosaura demonstrated good  understanding of the education provided. See EMR under Patient Instructions for exercises provided during therapy sessions.    Assessment     Rosaura is a 48 y.o. female referred to outpatient Physical Therapy with a medical diagnosis of Status post total shoulder arthroplasty, left. Patient presents with her L UE in a post-op sling with steri-strips covering her surgical incision. PT with limited L shoulder PROM. Sensation in the L UE is intact.    Patient prognosis is Good.   Patient will benefit from skilled outpatient Physical Therapy to address the deficits stated above and in the chart below, provide patient /family education, and to maximize patientt's level of independence.     Plan of care  discussed with patient: Yes  Patient's spiritual, cultural and educational needs considered and patient is agreeable to the plan of care and goals as stated below:     Anticipated Barriers for therapy: compliance    Medical Necessity is demonstrated by the following  History  Co-morbidities and personal factors that may impact the plan of care [] LOW: no personal factors / co-morbidities  [x] MODERATE: 1-2 personal factors / co-morbidities  [] HIGH: 3+ personal factors / co-morbidities    Moderate / High Support Documentation:   Co-morbidities affecting plan of care: chronic L shoulder pain    Personal Factors:   coping style     Examination  Body Structures and Functions, activity limitations and participation restrictions that may impact the plan of care [x] LOW: addressing 1-2 elements  [] MODERATE: 3+ elements  [] HIGH: 4+ elements (please support below)    Moderate / High Support Documentation: NA     Clinical Presentation [x] LOW: stable  [] MODERATE: Evolving  [] HIGH: Unstable     Decision Making/ Complexity Score: low       Goals:  Short Term Goals: 6 weeks   Pt will be instructed in an exercise program to address functional deficits related to L UE Sx.  Improve L shoulder ROM to >/= 120 degrees of flexion, 95 degrees of abduction, 35 degrees of external rotation and 25 degrees of internal rotation.  L shoulder girdle strength to >/= 2/5 into flexion, abduction, internal and external rotation  Pt will report a decrease in L shoulder pain to </= 4/10    Long Term Goals: 15 weeks   Pt will be independent in a HEP to assist in managing their L UE Sx.   Improve L shoulder ROM to >/= 150 degrees of flexion, 125 degrees of abduction, 50 degrees of external rotation and 35 degrees of internal rotation.  L shoulder girdle strength to >/= 2/5 into flexion, abduction, internal and external rotation  Pt will report that she is able to dress, bath and groom with greater ease  Pt will report improved functional ability  through an improved score on the FOTO shoulder survey  Plan     Plan of care Certification: 10/11/2023 to 12/11/2023.    Outpatient Physical Therapy 2 times weekly for 15 weeks to include the following interventions: Electrical Stimulation as indicated, Manual Therapy, Moist Heat/ Ice, Neuromuscular Re-ed, Patient Education, Self Care, Therapeutic Activities, Therapeutic Exercise, and Dry needling .     Stevan Sweet, PT        Physician's Signature: _________________________________________ Date: ________________

## 2023-10-12 NOTE — PROGRESS NOTES
[facility]   Physical Therapy Treatment Note      Name: Rosaura Mitchell  Clinic Number: [id number]    Therapy Diagnosis:   Encounter Diagnoses   Name Primary?    Chronic left shoulder pain Yes    Stiffness of left shoulder joint      Physician: Ariel Valladares MD    Visit Date: 10/13/2023    Physician Orders: PT Eval and Treat   Medical Diagnosis from Referral: Status post total shoulder arthroplasty, left  Evaluation Date: 10/11/2023  Authorization Period Expiration: 10/09/2024  Plan of Care Expiration: 12/11/2023  Progress Note Due: 11/11/2023  Date of Surgery: 9/28/2023  Visit # / Visits authorized: 1/ 20   FOTO: 1/ 3     Precautions: Standard, Diabetes, and Hodgkin lymphoma and L total shoulder 9/28/2023       Time In: 9:00 am  Time Out: 9:53 am  Total Billable Time: 43 minutes plus 10 minutes ice (3 TE medicaid)          PTA Visit #: 1/5       Subjective     Patient reports: she has been able to sleep better.  She was compliant with home exercise program.  Response to previous treatment: tolerated well  Functional change: ongoing    Pain: 6/10  Location: left shoulder      Objective      Objective Measures updated at progress report unless specified.     Treatment     Rosaura received the treatments listed below:      therapeutic exercises to develop strength, endurance, ROM, flexibility, posture, and core stabilization for 10 minutes including:  L Wrist flexion and extension x 30  L elbow flexion and extension x 30  Gripping x 30           manual therapy techniques: Joint mobilizations, Soft tissue Mobilization, and ROM were applied to the: L shoulder for 23 minutes, including:  Passive ROM into L shoulder flexion in a scapular plain   Passive ROM into L shoulder ER  STM to left upper trap     neuromuscular re-education activities to improve: Coordination, Kinesthetic, and Posture for 10 minutes. The following activities were included:  Scapular retractions x30  Shoulder shrugs  x30      cold pack for 10 minutes to left shoulder following treatment.     Patient Education and Home Exercises       Education provided:   - HEP  - Precautions, wearing sling    Written Home Exercises Provided: Patient instructed to cont prior HEP. Exercises were reviewed and Rosaura was able to demonstrate them prior to the end of the session.  Rosaura demonstrated good  understanding of the education provided. See Electronic Medical Record under Patient Instructions for exercises provided during therapy sessions    Assessment     Rosaura presents to treatment with sling with pillow on left upper extremity. She reports compliance with HEP and taking sling off intermittently. She has been able to sleep through the night, but wakes with pain around 6/10. Good tolerance to treatment today with soreness noted with PROM. Continue to progress per PT POC and MD protocol.     Rosaura Is progressing well towards her goals.   Patient prognosis is Good.     Patient will continue to benefit from skilled outpatient physical therapy to address the deficits listed in the problem list box on initial evaluation, provide pt/family education and to maximize pt's level of independence in the home and community environment.     Patient's spiritual, cultural and educational needs considered and pt agreeable to plan of care and goals.     Anticipated Barriers for therapy: compliance     Goals:  Short Term Goals: 6 weeks   Pt will be instructed in an exercise program to address functional deficits related to L UE Sx.  Improve L shoulder ROM to >/= 120 degrees of flexion, 95 degrees of abduction, 35 degrees of external rotation and 25 degrees of internal rotation.  L shoulder girdle strength to >/= 2/5 into flexion, abduction, internal and external rotation  Pt will report a decrease in L shoulder pain to </= 4/10     Long Term Goals: 15 weeks   Pt will be independent in a HEP to assist in managing their L UE Sx.   Improve L shoulder ROM to >/=  150 degrees of flexion, 125 degrees of abduction, 50 degrees of external rotation and 35 degrees of internal rotation.  L shoulder girdle strength to >/= 2/5 into flexion, abduction, internal and external rotation  Pt will report that she is able to dress, bath and groom with greater ease  Pt will report improved functional ability through an improved score on the FOTO shoulder survey    Plan     Continue to progress per PT POC    Latricia Roberson, [email or web address]

## 2023-10-12 NOTE — PLAN OF CARE
[facility]   Physical Therapy Initial Evaluation      Name: Rosaura Mitchell  Clinic Number: [id number]    Therapy Diagnosis:   Encounter Diagnoses   Name Primary?    Status post total shoulder arthroplasty, left     Chronic left shoulder pain     Stiffness of left shoulder joint         Physician: Ariel Valladares MD    Physician Orders: PT Eval and Treat   Medical Diagnosis from Referral: Status post total shoulder arthroplasty, left  Evaluation Date: 10/11/2023  Authorization Period Expiration: 10/09/2024  Plan of Care Expiration: 12/11/2023  Progress Note Due: 11/11/2023  Date of Surgery: 9/28/2023  Visit # / Visits authorized: 1/ 1   FOTO: 1/ 3    Precautions: Standard, Diabetes, and Hodgkin lymphoma and L total shoulder 9/28/2023      Time In: 3:35  Time Out: 4:30  Total Billable Time: 55 minutes    Subjective     Date of onset: L shoulder surgery 9/28/2023    History of current condition - Rosaura reports: Pt injured her L shoulder 10 years ago.  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 She also had a fall in July of 2023 re-injuring her L shoulder.  Pt has had physical therapy and injections through the past 10 years. Pt is R handed.    Falls: one in July of 2023 re-injuring her L shoulder    Imaging: X-Rays:     Prior Therapy: yes  Social History: pt lives lives in a second floor apt with her Mom and t grown children.   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 Pt was driving with her R hand  Current Level of Function: 2 weeks s/p L total shoulder in sling     Pain:  Current 6/10, worst 10/10, best 3/10   Location: left shoulder    Description: Aching  Aggravating Factors: positioning  Easing Factors: pain medication, ice, and stabilizing her L UE    Patients goals: to get as functional as possible     Medical History:   Past Medical  History:   Diagnosis Date    Diabetes mellitus     Hodgkin lymphoma     dx- Aug 15, 2018, chemo 9/25/18-2/26/2019, due to start radiation    Hyperlipidemia     Hypertension     Pulmonary embolism     dx June 26, 2018, on lovenox       Surgical History:   Rosaura Mitchell  has a past surgical history that includes Cholecystectomy (12/1998); Umbilical hernia repair (05/2001); Breast biopsy (Right); Arthroscopic debridement of shoulder (Left, 1/16/2020); and Arthroplasty of shoulder (Left, 9/28/2023).    Medications:   Rosaura has a current medication list which includes the following prescription(s): accu-chek fastclix lancing dev, albuterol, amitriptyline, atorvastatin, binaxnow covid-19 ag self test, cholecalciferol (vitamin d3), citalopram, doxepin, enoxaparin, famotidine, linzess, losartan, nurtec, ondansetron, onetouch ultra blue test strip, onetouch ultra2 meter, oxycodone-acetaminophen, pantoprazole, topiramate, xarelto, and ztlido, and the following Facility-Administered Medications: cefazolin 2 g/50ml dextrose ivpb.    Allergies:   Review of patient's allergies indicates:   Allergen Reactions    Naproxen      Hair falls out        Objective      Observation: [facility] indepenenntly with a post-op sling on the L UE    Posture: protracted L shoulder girdle in a post-op sling      Passive Range of Motion:   Shoulder Left Right   Flexion 55 nt   Abduction nt nt   ER at 0 -10 nt   ER at 90 nt nt   IR nt nt      Active Range of Motion:   Shoulder Left Right   Flexion nt nt   Abduction n t   ER at 0 nt nt   ER at 90 nt nt   IR nt nt     Upper Extremity Strength   (L) UE (R) UE   Shoulder flexion: nt nt   Shoulder Abduction: nt nt   Shoulder ER nt nt   Shoulder IR nt nt   Lower Trap nt nt   Middle Trap nt nt   Rhomboids nt nt       Joint Mobility: L GH joint hypomobile    Palpation: tender over anterior L shoulder incision    Sensation: intact    Flexibility: NT    Intake Outcome Measure for FOTO  Shoulder Survey    Therapist reviewed FOTO scores for Rosaura Mitchell on 10/11/2023.   FOTO report - see Media section or FOTO account episode details.    Intake Score: 48%         Treatment     Total Treatment time (time-based codes) separate from Evaluation: 24 minutes     Rosaura received the treatments listed below:      therapeutic exercises to develop strength, endurance, ROM, flexibility, posture, and core stabilization for 08 minutes including:  L Wrist flexion and extension x 10  L elbow flexion and extension x 10  Gripping x 10        manual therapy techniques: Joint mobilizations, Soft tissue Mobilization, and ROM were applied to the: L shoulder for 08 minutes, including:  Passive ROM into L shoulder flexion in a scapular plain   Passive ROM into L shoulder ER    neuromuscular re-education activities to improve: Coordination, Kinesthetic, and Posture for 08 minutes. The following activities were included:  Scapular retractions  Shoulder shrugs    Patient Education and Home Exercises     Education provided:   - Role of Physical Therapy  - Plan of Care    Written Home Exercises Provided: yes. Exercises were reviewed and Rosaura was able to demonstrate them prior to the end of the session.  Rosaura demonstrated good  understanding of the education provided. See EMR under Patient Instructions for exercises provided during therapy sessions.    Assessment     Rosaura is a 48 y.o. female referred to outpatient Physical Therapy with a medical diagnosis of Status post total shoulder arthroplasty, left. Patient presents with her L UE in a post-op sling with steri-strips covering her surgical incision. PT with limited L shoulder PROM. Sensation in the L UE is intact.    Patient prognosis is Good.   Patient will benefit from skilled outpatient Physical Therapy to address the deficits stated above and in the chart below, provide patient /family education, and to maximize patientt's level of independence.     Plan of care  discussed with patient: Yes  Patient's spiritual, cultural and educational needs considered and patient is agreeable to the plan of care and goals as stated below:     Anticipated Barriers for therapy: compliance    Medical Necessity is demonstrated by the following  History  Co-morbidities and personal factors that may impact the plan of care [] LOW: no personal factors / co-morbidities  [x] MODERATE: 1-2 personal factors / co-morbidities  [] HIGH: 3+ personal factors / co-morbidities    Moderate / High Support Documentation:   Co-morbidities affecting plan of care: chronic L shoulder pain    Personal Factors:   coping style     Examination  Body Structures and Functions, activity limitations and participation restrictions that may impact the plan of care [x] LOW: addressing 1-2 elements  [] MODERATE: 3+ elements  [] HIGH: 4+ elements (please support below)    Moderate / High Support Documentation: NA     Clinical Presentation [x] LOW: stable  [] MODERATE: Evolving  [] HIGH: Unstable     Decision Making/ Complexity Score: low       Goals:  Short Term Goals: 6 weeks   Pt will be instructed in an exercise program to address functional deficits related to L UE Sx.  Improve L shoulder ROM to >/= 120 degrees of flexion, 95 degrees of abduction, 35 degrees of external rotation and 25 degrees of internal rotation.  L shoulder girdle strength to >/= 2/5 into flexion, abduction, internal and external rotation  Pt will report a decrease in L shoulder pain to </= 4/10    Long Term Goals: 15 weeks   Pt will be independent in a HEP to assist in managing their L UE Sx.   Improve L shoulder ROM to >/= 150 degrees of flexion, 125 degrees of abduction, 50 degrees of external rotation and 35 degrees of internal rotation.  L shoulder girdle strength to >/= 2/5 into flexion, abduction, internal and external rotation  Pt will report that she is able to dress, bath and groom with greater ease  Pt will report improved functional ability  through an improved score on the FOTO shoulder survey  Plan     Plan of care Certification: 10/11/2023 to 12/11/2023.    Outpatient Physical Therapy 2 times weekly for 15 weeks to include the following interventions: Electrical Stimulation as indicated, Manual Therapy, Moist Heat/ Ice, Neuromuscular Re-ed, Patient Education, Self Care, Therapeutic Activities, Therapeutic Exercise, and Dry needling.     Stevan Sweet, PT        Physician's Signature: _________________________________________ Date: ________________     I certify the need for these services furnished under this plan of treatment and while under my care.        Ariel Valladares MD, FAAOS  , Orthopaedic Sports Medicine  Residency   [facility] Department of Orthopaedic Surgery  Assistant Orthopaedic Surgeon, [facility]  Head Team Physician, [facility]

## 2023-10-13 ENCOUNTER — CLINICAL SUPPORT (OUTPATIENT)
Dept: REHABILITATION | Facility: HOSPITAL | Age: 48
End: 2023-10-13
Payer: MEDICAID

## 2023-10-13 DIAGNOSIS — G89.29 CHRONIC LEFT SHOULDER PAIN: Primary | ICD-10-CM

## 2023-10-13 DIAGNOSIS — M25.612 STIFFNESS OF LEFT SHOULDER JOINT: ICD-10-CM

## 2023-10-13 DIAGNOSIS — M25.512 CHRONIC LEFT SHOULDER PAIN: Primary | ICD-10-CM

## 2023-10-13 PROCEDURE — 97110 THERAPEUTIC EXERCISES: CPT | Mod: PO,CQ

## 2023-10-13 NOTE — PROGRESS NOTES
[facility]   Physical Therapy Treatment Note      Name: Rosaura Mitchell  Clinic Number: [id number]    Therapy Diagnosis:   Encounter Diagnoses   Name Primary?    Chronic left shoulder pain Yes    Stiffness of left shoulder joint        Physician: Ariel Valladares MD    Visit Date: 10/16/2023    Physician Orders: PT Eval and Treat   Medical Diagnosis from Referral: Status post total shoulder arthroplasty, left  Evaluation Date: 10/11/2023  Authorization Period Expiration: 10/09/2024  Plan of Care Expiration: 12/11/2023  Progress Note Due: 11/11/2023  Date of Surgery: 9/28/2023  Visit # / Visits authorized: 3/ 20   FOTO: 1/ 3     Precautions: Standard, Diabetes, and Hodgkin lymphoma and L total shoulder 9/28/2023       Time In: 0858 AM  Time Out: 0953 AM  Total Billable Time: 55 minutes  (4 TE medicaid)          PTA Visit #: 1/5       Subjective     Patient reports: Patient reports that she took her pain medicine maybe an hour before arrival to clinic. .  She was compliant with home exercise program.  Response to previous treatment: tolerated well  Functional change: ongoing    Pain: 7/10  Location: left shoulder      Objective      Objective Measures updated at progress report unless specified.     Treatment     Rosaura received the treatments listed below:      therapeutic exercises to develop strength, endurance, ROM, flexibility, posture, and core stabilization for 25 minutes including:  Pendulums (fwd/bcwd ; lateral; CW/CCW) 1' ea  L Wrist flexion and extension x 30 ea (pillow+bolster under arm for support)  L wrist ulnar/radial deviation x 30 ea (pillow +bolster under arm for support)  L supination/pronation x 30 ea (pillow +bolster under arm for support)  L elbow flexion and extension x 30  Gripping x 30        manual therapy techniques: Joint mobilizations, Soft tissue Mobilization, and ROM were applied to the: L shoulder for 18 minutes, including:  Passive ROM into L shoulder  "flexion in a scapular plain   Passive ROM into L shoulder ER  STM to left upper trap     neuromuscular re-education activities to improve: Coordination, Kinesthetic, and Posture for 12 minutes. The following activities were included:  Scapular retractions 3x10x2" hold (hold is equivalent to deep breath in)  Shoulder shrugs + scapular depression (lower trapezius activation) 3x10x2" hold (hold is equivalent to deep breath in)      cold pack for 0 minutes to left shoulder following treatment.     Patient Education and Home Exercises       Education provided:   - HEP  - Precautions, wearing sling    Written Home Exercises Provided: Patient instructed to cont prior HEP. Exercises were reviewed and Rosaura was able to demonstrate them prior to the end of the session.  Rosaura demonstrated good  understanding of the education provided. See Electronic Medical Record under Patient Instructions for exercises provided during therapy sessions    Assessment     Rosaura Mitchell presented to clinic with sling doffed and arm resting on side. She did however tolerate treatment well this date. Introduced pendulums with cueing for proper performance  Patient still requires cueing with scapular retraction for proper lele-scapular muscle recruitment. Muscle guarding still present with PROM but good tissue response. Pt completed tx with reports of mild soreness and fatigue but no pain. No adverse effects noted upon pt exit. Re-adjusted pt sling for proper arm positioning.Continue to progress per PT POC and MD protocol.     Rosaura Is progressing well towards her goals.   Patient prognosis is Good.     Patient will continue to benefit from skilled outpatient physical therapy to address the deficits listed in the problem list box on initial evaluation, provide pt/family education and to maximize pt's level of independence in the home and community environment.     Patient's spiritual, cultural and educational needs considered and pt agreeable " to plan of care and goals.     Anticipated Barriers for therapy: compliance     Goals:  Short Term Goals: 6 weeks   Pt will be instructed in an exercise program to address functional deficits related to L UE Sx.  Improve L shoulder ROM to >/= 120 degrees of flexion, 95 degrees of abduction, 35 degrees of external rotation and 25 degrees of internal rotation.  L shoulder girdle strength to >/= 2/5 into flexion, abduction, internal and external rotation  Pt will report a decrease in L shoulder pain to </= 4/10     Long Term Goals: 15 weeks   Pt will be independent in a HEP to assist in managing their L UE Sx.   Improve L shoulder ROM to >/= 150 degrees of flexion, 125 degrees of abduction, 50 degrees of external rotation and 35 degrees of internal rotation.  L shoulder girdle strength to >/= 2/5 into flexion, abduction, internal and external rotation  Pt will report that she is able to dress, bath and groom with greater ease  Pt will report improved functional ability through an improved score on the FOTO shoulder survey    Plan     Continue to progress per PT POC    Edelmira Barba, PT

## 2023-10-16 ENCOUNTER — CLINICAL SUPPORT (OUTPATIENT)
Dept: REHABILITATION | Facility: HOSPITAL | Age: 48
End: 2023-10-16
Payer: MEDICAID

## 2023-10-16 DIAGNOSIS — M25.612 STIFFNESS OF LEFT SHOULDER JOINT: ICD-10-CM

## 2023-10-16 DIAGNOSIS — G89.29 CHRONIC LEFT SHOULDER PAIN: Primary | ICD-10-CM

## 2023-10-16 DIAGNOSIS — M25.512 CHRONIC LEFT SHOULDER PAIN: Primary | ICD-10-CM

## 2023-10-16 PROCEDURE — 97110 THERAPEUTIC EXERCISES: CPT | Mod: PO

## 2023-10-18 ENCOUNTER — OFFICE VISIT (OUTPATIENT)
Dept: OBSTETRICS AND GYNECOLOGY | Facility: CLINIC | Age: 48
End: 2023-10-18
Payer: MEDICAID

## 2023-10-18 ENCOUNTER — CLINICAL SUPPORT (OUTPATIENT)
Dept: REHABILITATION | Facility: HOSPITAL | Age: 48
End: 2023-10-18
Payer: MEDICAID

## 2023-10-18 VITALS
BODY MASS INDEX: 38.98 KG/M2 | SYSTOLIC BLOOD PRESSURE: 117 MMHG | WEIGHT: 234.25 LBS | DIASTOLIC BLOOD PRESSURE: 76 MMHG

## 2023-10-18 DIAGNOSIS — Z87.42 HISTORY OF ABNORMAL CERVICAL PAP SMEAR: ICD-10-CM

## 2023-10-18 DIAGNOSIS — G89.29 CHRONIC LEFT SHOULDER PAIN: Primary | ICD-10-CM

## 2023-10-18 DIAGNOSIS — Z01.419 WELL WOMAN EXAM WITH ROUTINE GYNECOLOGICAL EXAM: Primary | ICD-10-CM

## 2023-10-18 DIAGNOSIS — Z12.4 SCREENING FOR CERVICAL CANCER: ICD-10-CM

## 2023-10-18 DIAGNOSIS — M25.612 STIFFNESS OF LEFT SHOULDER JOINT: ICD-10-CM

## 2023-10-18 DIAGNOSIS — M25.512 CHRONIC LEFT SHOULDER PAIN: Primary | ICD-10-CM

## 2023-10-18 PROCEDURE — 97110 THERAPEUTIC EXERCISES: CPT | Mod: PO

## 2023-10-18 PROCEDURE — 99396 PR PREVENTIVE VISIT,EST,40-64: ICD-10-PCS | Mod: S$PBB,,, | Performed by: OBSTETRICS & GYNECOLOGY

## 2023-10-18 PROCEDURE — 99999 PR PBB SHADOW E&M-EST. PATIENT-LVL III: ICD-10-PCS | Mod: PBBFAC,,, | Performed by: OBSTETRICS & GYNECOLOGY

## 2023-10-18 PROCEDURE — 3074F SYST BP LT 130 MM HG: CPT | Mod: CPTII,,, | Performed by: OBSTETRICS & GYNECOLOGY

## 2023-10-18 PROCEDURE — 3074F PR MOST RECENT SYSTOLIC BLOOD PRESSURE < 130 MM HG: ICD-10-PCS | Mod: CPTII,,, | Performed by: OBSTETRICS & GYNECOLOGY

## 2023-10-18 PROCEDURE — 3008F PR BODY MASS INDEX (BMI) DOCUMENTED: ICD-10-PCS | Mod: CPTII,,, | Performed by: OBSTETRICS & GYNECOLOGY

## 2023-10-18 PROCEDURE — 3044F PR MOST RECENT HEMOGLOBIN A1C LEVEL <7.0%: ICD-10-PCS | Mod: CPTII,,, | Performed by: OBSTETRICS & GYNECOLOGY

## 2023-10-18 PROCEDURE — 99213 OFFICE O/P EST LOW 20 MIN: CPT | Mod: PBBFAC,PO | Performed by: OBSTETRICS & GYNECOLOGY

## 2023-10-18 PROCEDURE — 99396 PREV VISIT EST AGE 40-64: CPT | Mod: S$PBB,,, | Performed by: OBSTETRICS & GYNECOLOGY

## 2023-10-18 PROCEDURE — 99999 PR PBB SHADOW E&M-EST. PATIENT-LVL III: CPT | Mod: PBBFAC,,, | Performed by: OBSTETRICS & GYNECOLOGY

## 2023-10-18 PROCEDURE — 1160F RVW MEDS BY RX/DR IN RCRD: CPT | Mod: CPTII,,, | Performed by: OBSTETRICS & GYNECOLOGY

## 2023-10-18 PROCEDURE — 1160F PR REVIEW ALL MEDS BY PRESCRIBER/CLIN PHARMACIST DOCUMENTED: ICD-10-PCS | Mod: CPTII,,, | Performed by: OBSTETRICS & GYNECOLOGY

## 2023-10-18 PROCEDURE — 3078F PR MOST RECENT DIASTOLIC BLOOD PRESSURE < 80 MM HG: ICD-10-PCS | Mod: CPTII,,, | Performed by: OBSTETRICS & GYNECOLOGY

## 2023-10-18 PROCEDURE — 88175 CYTOPATH C/V AUTO FLUID REDO: CPT | Performed by: OBSTETRICS & GYNECOLOGY

## 2023-10-18 PROCEDURE — 4010F ACE/ARB THERAPY RXD/TAKEN: CPT | Mod: CPTII,,, | Performed by: OBSTETRICS & GYNECOLOGY

## 2023-10-18 PROCEDURE — 3044F HG A1C LEVEL LT 7.0%: CPT | Mod: CPTII,,, | Performed by: OBSTETRICS & GYNECOLOGY

## 2023-10-18 PROCEDURE — 3008F BODY MASS INDEX DOCD: CPT | Mod: CPTII,,, | Performed by: OBSTETRICS & GYNECOLOGY

## 2023-10-18 PROCEDURE — 4010F PR ACE/ARB THEARPY RXD/TAKEN: ICD-10-PCS | Mod: CPTII,,, | Performed by: OBSTETRICS & GYNECOLOGY

## 2023-10-18 PROCEDURE — 87624 HPV HI-RISK TYP POOLED RSLT: CPT | Performed by: OBSTETRICS & GYNECOLOGY

## 2023-10-18 PROCEDURE — 1159F PR MEDICATION LIST DOCUMENTED IN MEDICAL RECORD: ICD-10-PCS | Mod: CPTII,,, | Performed by: OBSTETRICS & GYNECOLOGY

## 2023-10-18 PROCEDURE — 3078F DIAST BP <80 MM HG: CPT | Mod: CPTII,,, | Performed by: OBSTETRICS & GYNECOLOGY

## 2023-10-18 PROCEDURE — 1159F MED LIST DOCD IN RCRD: CPT | Mod: CPTII,,, | Performed by: OBSTETRICS & GYNECOLOGY

## 2023-10-18 NOTE — PROGRESS NOTES
OBSTETRIC HISTORY:   OB History          2    Para   2    Term   0            AB        Living   2         SAB        IAB        Ectopic        Multiple        Live Births   2                  COMPREHENSIVE GYN HISTORY:  PAP History: Reports abnormal Paps.  Infection History: Denies STDs. Denies PID.  Benign History: Denies uterine fibroids. Denies ovarian cysts. Denies endometriosis.   Cancer History: Denies cervical cancer. Denies uterine cancer or hyperplasia. Denies ovarian cancer. Denies vulvar cancer or pre-cancer. Denies vaginal cancer or pre-cancer. Denies breast cancer. Denies colon cancer.  Sexual Activity History:   reports that she is not currently sexually active. She reports using the following method of birth control/protection: Abstinence.   Menstrual History: Age of menarche: 12 years. None since chemo for Hodgkin's lymphoma.      HPI:   48 y.o.  No LMP recorded. Patient is premenopausal.   Patient is  here for her annual gynecologic exam.  She has no GYN complaints. She denies bladder, breast and bowel complaints.    ROS:  GENERAL: No weight gain or weight loss.   CHEST: No shortness of breath.   ABDOMEN: No abdominal pain, constipation, diarrhea, nausea, vomiting or rectal bleeding.   URINARY: No frequency, dysuria, hematuria, or burning on urination.  REPRODUCTIVE: See HPI.   BREASTS: No breast pain, lumps, or nipple discharge.   PSYCHIATRIC: No depression or anxiety.    PE:   /76   Wt 106.3 kg (234 lb 3.8 oz)   BMI 38.98 kg/m²   APPEARANCE: Well nourished, well developed, in no acute distress.  NECK: Neck symmetric without  thyromegaly.  NODES: No inguinal, cervical lymph node enlargement.  CHEST: Lungs clear to auscultation.  HEART: Regular rate and rhythm, no murmurs, rubs or gallops.  ABDOMEN: Soft. No tenderness or masses. No hernias.  BREASTS: Symmetrical, no skin changes or visible lesions. No palpable masses, nipple discharge or adenopathy bilaterally.  PELVIC:    VULVA: No lesions. Normal female genitalia.  URETHRAL MEATUS: Normal size and location, no lesions, no prolapse.  URETHRA: No masses, tenderness, prolapse or scarring.  VAGINA: Moist and well rugated, no discharge, no significant cystocele or rectocele.  CERVIX: No lesions and discharge.  UTERUS: Normal size, regular shape, mobile, non-tender, bladder base nontender.  ADNEXA: No masses or tenderness.    PROCEDURES:  Pap smear  HRHPV    Assessment:  Normal Gynecologic Exam  Hx of abnormal pap    Plan:  Mammogram and Colonoscopy if indicated by current recommendations.  Return to clinic in one year or for any problems or complaints.    Counseling:  Patient was counseled today on A.C.S. Pap guidelines and recommendations for yearly pelvic exams and monthly self breast exams; to see her PCP for other health maintenance. Regular exercise and healthy diet.     As of April 1, 2021, the Cures Act has been passed nationally. This new law requires that all doctors progress notes, lab results, pathology reports and radiology reports be released IMMEDIATELY to the patient in the patient portal. That means that the results are released to you at the EXACT same time they are released to me. Therefore, with all of the patients that I have I am not able to reply to each patient exactly when the results come in. So there will be a delay from when you see the results to when I see them and have time to come up with a response to send you. Also I only see these results when I am on the computer at work. So if the results come in over the weekend or after 5 pm of a work day, I will not see them until the next business day. As you can tell, this is a challenge as a physician to give every patient the quick response they hope for and deserve. So please be patient!     Thanks for understanding,

## 2023-10-18 NOTE — PROGRESS NOTES
[facility]   Physical Therapy Treatment Note      Name: Rosaura Mitchell  Clinic Number: [id number]    Therapy Diagnosis:   Encounter Diagnoses   Name Primary?    Chronic left shoulder pain Yes    Stiffness of left shoulder joint          Physician: Ariel Valladares MD    Visit Date: 10/18/2023    Physician Orders: PT Eval and Treat   Medical Diagnosis from Referral: Status post total shoulder arthroplasty, left  Evaluation Date: 10/11/2023  Authorization Period Expiration: 10/09/2024  Plan of Care Expiration: 12/11/2023  Progress Note Due: 11/11/2023  Date of Surgery: 9/28/2023  Visit # / Visits authorized: 3/ 20   FOTO: 1/ 3     Precautions: Standard, Diabetes, and Hodgkin lymphoma and L total shoulder 9/28/2023       Time In: 3:00 PM  Time Out: 4:00 PM  Total Billable Time: 55 minutes  (4 TE medicaid)          PTA Visit #: 1/5       Subjective     Patient reports: Patient reports that her shoulder remains sore.  She was compliant with home exercise program.  Response to previous treatment: tolerated well  Functional change: ongoing    Pain: 7/10  Location: left shoulder      Objective      Objective Measures updated at progress report unless specified.     Treatment     Rosaura received the treatments listed below:      therapeutic exercises to develop strength, endurance, ROM, flexibility, posture, and core stabilization for 23 minutes including:  Pendulums (fwd/bcwd ; lateral; CW/CCW) 1' ea  L Wrist flexion and extension x 30 ea (pillow+bolster under arm for support)  L wrist ulnar/radial deviation x 30 ea (pillow +bolster under arm for support)  L supination/pronation x 30 ea (pillow +bolster under arm for support)  L elbow flexion and extension x 30  Gripping x 30        manual therapy techniques: Joint mobilizations, Soft tissue Mobilization, and ROM were applied to the: L shoulder for 23 minutes, including:  Passive ROM into L shoulder flexion in a scapular plain   Passive ROM  "into L shoulder ER  STM to left upper trap     neuromuscular re-education activities to improve: Coordination, Kinesthetic, and Posture for 12 minutes. The following activities were included:  Scapular retractions 3x10x2" hold (hold is equivalent to deep breath in)  Shoulder shrugs + scapular depression (lower trapezius activation) 3x10x2" hold (hold is equivalent to deep breath in)      cold pack for 0 minutes to left shoulder following treatment.     Patient Education and Home Exercises       Education provided:   - HEP  - Precautions, wearing sling    Written Home Exercises Provided: Patient instructed to cont prior HEP. Exercises were reviewed and Rosaura was able to demonstrate them prior to the end of the session.  Rosauar demonstrated good  understanding of the education provided. See Electronic Medical Record under Patient Instructions for exercises provided during therapy sessions    Assessment     Rosaura Mitchell presented to clinic with sling doffed and arm resting on side. She was able to perform active hand, wrist, forearm and elbow exercises as per post-op protocol.  She received cueing with pendulum exercise to minimize active shoulder activity.  Pt tolerated exercise and PROM well today. Continue to progress per PT POC and MD protocol.     Rosaura Is progressing well towards her goals.   Patient prognosis is Good.     Patient will continue to benefit from skilled outpatient physical therapy to address the deficits listed in the problem list box on initial evaluation, provide pt/family education and to maximize pt's level of independence in the home and community environment.     Patient's spiritual, cultural and educational needs considered and pt agreeable to plan of care and goals.     Anticipated Barriers for therapy: compliance     Goals:  Short Term Goals: 6 weeks   Pt will be instructed in an exercise program to address functional deficits related to L UE Sx.  Improve L shoulder ROM to >/= 120 " degrees of flexion, 95 degrees of abduction, 35 degrees of external rotation and 25 degrees of internal rotation.  L shoulder girdle strength to >/= 2/5 into flexion, abduction, internal and external rotation  Pt will report a decrease in L shoulder pain to </= 4/10     Long Term Goals: 15 weeks   Pt will be independent in a HEP to assist in managing their L UE Sx.   Improve L shoulder ROM to >/= 150 degrees of flexion, 125 degrees of abduction, 50 degrees of external rotation and 35 degrees of internal rotation.  L shoulder girdle strength to >/= 2/5 into flexion, abduction, internal and external rotation  Pt will report that she is able to dress, bath and groom with greater ease  Pt will report improved functional ability through an improved score on the FOTO shoulder survey    Plan     Continue to progress per PT POC    Stevan Sweet, PT

## 2023-10-20 NOTE — PROGRESS NOTES
[facility]   Physical Therapy Treatment Note      Name: Rosaura Mitchell  Clinic Number: [id number]    Therapy Diagnosis:   Encounter Diagnoses   Name Primary?    Chronic left shoulder pain Yes    Stiffness of left shoulder joint        Physician: Ariel Valladares MD    Visit Date: 10/23/2023    Physician Orders: PT Eval and Treat   Medical Diagnosis from Referral: Status post total shoulder arthroplasty, left  Evaluation Date: 10/11/2023  Authorization Period Expiration: 10/09/2024  Plan of Care Expiration: 12/11/2023  Progress Note Due: 11/11/2023  Date of Surgery: 9/28/2023  Visit # / Visits authorized: 5/ 20   FOTO: 1/ 3     Precautions: Standard, Diabetes, and Hodgkin lymphoma and L total shoulder 9/28/2023       Time In: 0900 AM  Time Out: 0955 AM  Total Billable Time: 55 minutes  (4 TE medicaid)          PTA Visit #: 1/5       Subjective     Patient reports: Patient reports that she has run out of her pain medicine but has been consistent with icing.   She was compliant with home exercise program.  Response to previous treatment: tolerated well  Functional change: ongoing    Pain: 5-6/10  Location: left shoulder      Objective      Objective Measures updated at progress report unless specified.     Treatment     Rosaura received the treatments listed below:      therapeutic exercises to develop strength, endurance, ROM, flexibility, posture, and core stabilization for 43 minutes including:  Pendulums (fwd/bcwd ; lateral; CW/CCW) 1' ea  L Wrist flexion and extension x 30 ea (pillow+bolster under arm for support)  L wrist ulnar/radial deviation x 30 ea (pillow +bolster under arm for support)  L supination/pronation x 30 ea (pillow +bolster under arm for support)  L elbow flexion and extension x 30  Gripping x 30  Table slides (flexion and scaption) x30 ea        manual therapy techniques: Joint mobilizations, Soft tissue Mobilization, and ROM were applied to the: L shoulder for 0  "minutes, including:  Passive ROM into L shoulder flexion in a scapular plain   Passive ROM into L shoulder ER  STM to left upper trap     neuromuscular re-education activities to improve: Coordination, Kinesthetic, and Posture for 12 minutes. The following activities were included:  Scapular retractions 3x10x2" hold (hold is equivalent to deep breath in)  Shoulder shrugs + scapular depression (lower trapezius activation) 3x10x2" hold (hold is equivalent to deep breath in)    cold pack for 0 minutes to left shoulder following treatment.     Patient Education and Home Exercises       Education provided:   - HEP  - Precautions, wearing sling    Written Home Exercises Provided: Patient instructed to cont prior HEP. Exercises were reviewed and Rosaura was able to demonstrate them prior to the end of the session.  Rosaura demonstrated good  understanding of the education provided. See Electronic Medical Record under Patient Instructions for exercises provided during therapy sessions    Assessment     Rosaura Mitchell presented to clinic with sling donned. Tolerated treatment well. Introduced table slides with no difficulty. Demonstrates good understanding of HEP, safety and precautions. Good lele-scapular muscle activation.  She did however tolerate treatment well this date. Introduced pendulums with cueing for proper performance  Patient still requires cueing with scapular retraction for proper lele-scapular muscle recruitment. Muscle guarding still present with PROM but good tissue response. Pt completed tx with reports of mild soreness and fatigue but no pain. No adverse effects noted upon pt exit. Continue to progress per PT POC and MD protocol.     Rosaura Is progressing well towards her goals.   Patient prognosis is Good.     Patient will continue to benefit from skilled outpatient physical therapy to address the deficits listed in the problem list box on initial evaluation, provide pt/family education and to maximize " pt's level of independence in the home and community environment.     Patient's spiritual, cultural and educational needs considered and pt agreeable to plan of care and goals.     Anticipated Barriers for therapy: compliance     Goals:  Short Term Goals: 6 weeks   Pt will be instructed in an exercise program to address functional deficits related to L UE Sx.  Improve L shoulder ROM to >/= 120 degrees of flexion, 95 degrees of abduction, 35 degrees of external rotation and 25 degrees of internal rotation.  L shoulder girdle strength to >/= 2/5 into flexion, abduction, internal and external rotation  Pt will report a decrease in L shoulder pain to </= 4/10     Long Term Goals: 15 weeks   Pt will be independent in a HEP to assist in managing their L UE Sx.   Improve L shoulder ROM to >/= 150 degrees of flexion, 125 degrees of abduction, 50 degrees of external rotation and 35 degrees of internal rotation.  L shoulder girdle strength to >/= 2/5 into flexion, abduction, internal and external rotation  Pt will report that she is able to dress, bath and groom with greater ease  Pt will report improved functional ability through an improved score on the FOTO shoulder survey    Plan     Continue to progress per PT POC    Edelmira Barba PT

## 2023-10-22 ENCOUNTER — PATIENT MESSAGE (OUTPATIENT)
Dept: ORTHOPEDICS | Facility: CLINIC | Age: 48
End: 2023-10-22
Payer: MEDICAID

## 2023-10-23 ENCOUNTER — CLINICAL SUPPORT (OUTPATIENT)
Dept: REHABILITATION | Facility: HOSPITAL | Age: 48
End: 2023-10-23
Payer: MEDICAID

## 2023-10-23 DIAGNOSIS — M25.512 CHRONIC LEFT SHOULDER PAIN: Primary | ICD-10-CM

## 2023-10-23 DIAGNOSIS — M25.612 STIFFNESS OF LEFT SHOULDER JOINT: ICD-10-CM

## 2023-10-23 DIAGNOSIS — G89.29 CHRONIC LEFT SHOULDER PAIN: Primary | ICD-10-CM

## 2023-10-23 LAB
FINAL PATHOLOGIC DIAGNOSIS: NORMAL
HPV HR 12 DNA SPEC QL NAA+PROBE: NEGATIVE
HPV16 AG SPEC QL: NEGATIVE
HPV18 DNA SPEC QL NAA+PROBE: NEGATIVE
Lab: NORMAL

## 2023-10-23 PROCEDURE — 97110 THERAPEUTIC EXERCISES: CPT | Mod: PO

## 2023-10-23 RX ORDER — OXYCODONE AND ACETAMINOPHEN 5; 325 MG/1; MG/1
1 TABLET ORAL EVERY 6 HOURS PRN
Qty: 20 TABLET | Refills: 0 | Status: SHIPPED | OUTPATIENT
Start: 2023-10-23

## 2023-10-25 ENCOUNTER — CLINICAL SUPPORT (OUTPATIENT)
Dept: REHABILITATION | Facility: HOSPITAL | Age: 48
End: 2023-10-25
Payer: MEDICAID

## 2023-10-25 DIAGNOSIS — M25.612 STIFFNESS OF LEFT SHOULDER JOINT: ICD-10-CM

## 2023-10-25 DIAGNOSIS — M25.512 CHRONIC LEFT SHOULDER PAIN: Primary | ICD-10-CM

## 2023-10-25 DIAGNOSIS — G89.29 CHRONIC LEFT SHOULDER PAIN: Primary | ICD-10-CM

## 2023-10-25 PROCEDURE — 97112 NEUROMUSCULAR REEDUCATION: CPT | Mod: PO

## 2023-10-25 PROCEDURE — 97140 MANUAL THERAPY 1/> REGIONS: CPT | Mod: PO

## 2023-10-25 NOTE — PROGRESS NOTES
[facility]   Physical Therapy Treatment Note      Name: Rosaura Mitchell  Clinic Number: [id number]    Therapy Diagnosis:   Encounter Diagnoses   Name Primary?    Chronic left shoulder pain Yes    Stiffness of left shoulder joint        Physician: Ariel Valladares MD    Visit Date: 10/25/2023    Physician Orders: PT Eval and Treat   Medical Diagnosis from Referral: Status post total shoulder arthroplasty, left  Evaluation Date: 10/11/2023  Authorization Period Expiration: 10/09/2024  Plan of Care Expiration: 12/11/2023  Progress Note Due: 11/11/2023  Date of Surgery: 9/28/2023  Visit # / Visits authorized: 5/ 20   FOTO: 1/ 3     Precautions: Standard, Diabetes, and Hodgkin lymphoma and L total shoulder 9/28/2023       Time In: 2:02 PM  Time Out: 2:59 PM  Total Billable Time: 57 minutes  (4 TE medicaid)          PTA Visit #: 1/5       Subjective     Patient reports: Patient reports that is using ice often.  She was compliant with home exercise program.  Response to previous treatment: tolerated well  Functional change: ongoing    Pain: 5-6/10  Location: left shoulder      Objective      Objective Measures updated at progress report unless specified.     Treatment   Bolded activities performed today:  Rosaura received the treatments listed below:      therapeutic exercises to develop strength, endurance, ROM, flexibility, posture, and core stabilization for 00 minutes including:  Pendulums (fwd/bcwd ; lateral; CW/CCW) 1' ea  L Wrist flexion and extension x 30 ea (pillow+bolster under arm for support)  L wrist ulnar/radial deviation x 30 ea (pillow +bolster under arm for support)  L supination/pronation x 30 ea (pillow +bolster under arm for support)  L elbow flexion and extension x 30  Gripping x 30  Table slides (flexion and scaption) x30 ea        manual therapy techniques: Joint mobilizations, Soft tissue Mobilization, and ROM were applied to the: L shoulder for 30 minutes,  "including:  Passive ROM into L shoulder flexion in a scapular plain   Passive ROM into L shoulder ER  STM to left upper trap     neuromuscular re-education activities to improve: Coordination, Kinesthetic, and Posture for 12 minutes. The following activities were included:  Scapular retractions 3x10x2" hold (hold is equivalent to deep breath in)  Shoulder shrugs + scapular depression (lower trapezius activation) 3x10x2" hold (hold is equivalent to deep breath in)    cold pack for 0 minutes to left shoulder following treatment.     Patient Education and Home Exercises       Education provided:   - HEP  - Precautions, wearing sling    Written Home Exercises Provided: Patient instructed to cont prior HEP. Exercises were reviewed and Rosaura was able to demonstrate them prior to the end of the session.  Rosaura demonstrated good  understanding of the education provided. See Electronic Medical Record under Patient Instructions for exercises provided during therapy sessions    Assessment     Rosaura Mitchell presented to clinic with sling marnio. She continues to c/o L shoulder pain. Pt received L shoulder PROM as indicated in Phase one of her rehabilitation protocol. Pt able to progress ROM and begin isometric exercises in her next Tx.  Continue to progress per PT POC and MD protocol.     Rosaura Is progressing well towards her goals.   Patient prognosis is Good.     Patient will continue to benefit from skilled outpatient physical therapy to address the deficits listed in the problem list box on initial evaluation, provide pt/family education and to maximize pt's level of independence in the home and community environment.     Patient's spiritual, cultural and educational needs considered and pt agreeable to plan of care and goals.     Anticipated Barriers for therapy: compliance     Goals:  Short Term Goals: 6 weeks   Pt will be instructed in an exercise program to address functional deficits related to L UE Sx.  Improve L " shoulder ROM to >/= 120 degrees of flexion, 95 degrees of abduction, 35 degrees of external rotation and 25 degrees of internal rotation.  L shoulder girdle strength to >/= 2/5 into flexion, abduction, internal and external rotation  Pt will report a decrease in L shoulder pain to </= 4/10     Long Term Goals: 15 weeks   Pt will be independent in a HEP to assist in managing their L UE Sx.   Improve L shoulder ROM to >/= 150 degrees of flexion, 125 degrees of abduction, 50 degrees of external rotation and 35 degrees of internal rotation.  L shoulder girdle strength to >/= 2/5 into flexion, abduction, internal and external rotation  Pt will report that she is able to dress, bath and groom with greater ease  Pt will report improved functional ability through an improved score on the FOTO shoulder survey    Plan     Continue to progress per PT POC    Stevan Sweet, PT

## 2023-10-30 ENCOUNTER — CLINICAL SUPPORT (OUTPATIENT)
Dept: REHABILITATION | Facility: HOSPITAL | Age: 48
End: 2023-10-30
Payer: MEDICAID

## 2023-10-30 DIAGNOSIS — M25.512 CHRONIC LEFT SHOULDER PAIN: Primary | ICD-10-CM

## 2023-10-30 DIAGNOSIS — M25.612 STIFFNESS OF LEFT SHOULDER JOINT: ICD-10-CM

## 2023-10-30 DIAGNOSIS — G89.29 CHRONIC LEFT SHOULDER PAIN: Primary | ICD-10-CM

## 2023-10-30 PROCEDURE — 97140 MANUAL THERAPY 1/> REGIONS: CPT | Mod: PO

## 2023-10-30 PROCEDURE — 97110 THERAPEUTIC EXERCISES: CPT | Mod: PO

## 2023-10-30 PROCEDURE — 97112 NEUROMUSCULAR REEDUCATION: CPT | Mod: PO

## 2023-10-30 NOTE — PROGRESS NOTES
"[facility]   Physical Therapy Treatment Note      Name: Rosaura Mitchell  Clinic Number: [id number]    Therapy Diagnosis:   Encounter Diagnoses   Name Primary?    Chronic left shoulder pain Yes    Stiffness of left shoulder joint        Physician: Ariel Valladares MD    Visit Date: 10/30/2023    Physician Orders: PT Eval and Treat   Medical Diagnosis from Referral: Status post total shoulder arthroplasty, left  Evaluation Date: 10/11/2023  Authorization Period Expiration: 10/09/2024  Plan of Care Expiration: 12/11/2023  Progress Note Due: 11/11/2023  Date of Surgery: 9/28/2023  Visit # / Visits authorized: 5/ 20   FOTO: 1/ 3     Precautions: Standard, Diabetes, and Hodgkin lymphoma and L total shoulder 9/28/2023       Time In: 0903  Time Out: 1000  Total Billable Time: 57 minutes  (4 TE medicaid)          PTA Visit #: 1/5       Subjective     Patient reports: Patient reports that on Saturday she was experiencing some pain in the forearm. She says that she was trying to cook and cut up supplies in which she thinks has contributed to her pain. Today "my pain is still there but not as bad as it was"  She was compliant with home exercise program.  Response to previous treatment: tolerated well  Functional change: ongoing    Pain: 5/10  Location: left shoulder      Objective      Objective Measures updated at progress report unless specified.     Treatment   Bolded activities performed today:  Rosaura received the treatments listed below:      therapeutic exercises to develop strength, endurance, ROM, flexibility, posture, and core stabilization for 37 minutes including:      L Wrist flexion and extension x 30 ea (pillow+bolster under arm for support)  L wrist ulnar/radial deviation x 30 ea (pillow +bolster under arm for support)  L supination/pronation x 30 ea (pillow +bolster under arm for support)  L elbow flexion and extension x 30    Scapular retractions 3x10x2" hold (hold is equivalent " "to deep breath in)  Shoulder shrugs + scapular depression (lower trapezius activation) 3x10x2" hold (hold is equivalent to deep breath in)    Pulleys (flexion and abductions) 3' ea        manual therapy techniques: Joint mobilizations, Soft tissue Mobilization, and ROM were applied to the: L shoulder for 10 minutes, including:  Passive ROM into L shoulder flexion in a scapular plain   Passive ROM into L shoulder ER     neuromuscular re-education activities to improve: Coordination, Kinesthetic, and Posture for 10 minutes. The following activities were included:  Shoulder isometrics (flexion, extension, abduction and adduction) w/ ball against wall 10x3" holds    cold pack for 0 minutes to left shoulder following treatment.     Patient Education and Home Exercises       Education provided:   - HEP  - Precautions, wearing sling    Written Home Exercises Provided: Patient instructed to cont prior HEP. Exercises were reviewed and Rosaura was able to demonstrate them prior to the end of the session.  Rosaura demonstrated good  understanding of the education provided. See Electronic Medical Record under Patient Instructions for exercises provided during therapy sessions    Assessment     Introduced pulleys and shoulder isometrics as appropriate for current post-op status/protocol. She did express some soreness after pulleys but was able to complete at slowed pace. Able to passively move patient arm to ~105-110 degrees flexion and ~15 degrees of ER with initial muscle guarding but improved tolerance with cueing.Tolerated treatment well with no adverse effects. Continue to progress per PT POC and MD protocol.     Rosaura Is progressing well towards her goals.   Patient prognosis is Good.     Patient will continue to benefit from skilled outpatient physical therapy to address the deficits listed in the problem list box on initial evaluation, provide pt/family education and to maximize pt's level of independence in the home and " community environment.     Patient's spiritual, cultural and educational needs considered and pt agreeable to plan of care and goals.     Anticipated Barriers for therapy: compliance     Goals:  Short Term Goals: 6 weeks   Pt will be instructed in an exercise program to address functional deficits related to L UE Sx.  Improve L shoulder ROM to >/= 120 degrees of flexion, 95 degrees of abduction, 35 degrees of external rotation and 25 degrees of internal rotation.  L shoulder girdle strength to >/= 2/5 into flexion, abduction, internal and external rotation  Pt will report a decrease in L shoulder pain to </= 4/10     Long Term Goals: 15 weeks   Pt will be independent in a HEP to assist in managing their L UE Sx.   Improve L shoulder ROM to >/= 150 degrees of flexion, 125 degrees of abduction, 50 degrees of external rotation and 35 degrees of internal rotation.  L shoulder girdle strength to >/= 2/5 into flexion, abduction, internal and external rotation  Pt will report that she is able to dress, bath and groom with greater ease  Pt will report improved functional ability through an improved score on the FOTO shoulder survey    Plan     Continue to progress per PT POC. Consider initiating supine AAROM shoulder elevation.     Edelmira Barba, PT

## 2023-10-31 NOTE — PROGRESS NOTES
"[facility]   Physical Therapy Treatment Note      Name: Rosaura Mitchell  Clinic Number: [id number]    Therapy Diagnosis:   Encounter Diagnoses   Name Primary?    Chronic left shoulder pain Yes    Stiffness of left shoulder joint          Physician: Ariel Valladares MD    Visit Date: 11/1/2023    Physician Orders: PT Eval and Treat   Medical Diagnosis from Referral: Status post total shoulder arthroplasty, left  Evaluation Date: 10/11/2023  Authorization Period Expiration: 10/09/2024  Plan of Care Expiration: 12/11/2023  Progress Note Due: 11/11/2023  Date of Surgery: 9/28/2023  Visit # / Visits authorized: 7/ 20   FOTO: 1/ 3     Precautions: Standard, Diabetes, and Hodgkin lymphoma and L total shoulder 9/28/2023       Time In: 1002  Time Out: 1105  Total Billable Time: 63 minutes  (4 TE medicaid)          PTA Visit #: 1/5       Subjective     Patient reports: some pain in shoulder  She was compliant with home exercise program.  Response to previous treatment: tolerated well  Functional change: ongoing    Pain: 6/10  Location: left shoulder      Objective      Objective Measures updated at progress report unless specified.     Treatment   Bolded activities performed today:  Rosaura received the treatments listed below:      therapeutic exercises to develop strength, endurance, ROM, flexibility, posture, and core stabilization for 37 minutes including:    L elbow flexion x 30    Scapular retractions 3x10x2" hold (hold is equivalent to deep breath in)  Shoulder shrugs + scapular depression (lower trapezius activation) 15x2" hold (hold is equivalent to deep breath in)    Pulleys (flexion and abductions) 3' ea  AAROM shoulder flexion (supine) x30 w/ dowel adrianna  AAROM shoulder ER in neutral (supine) x30 w/ dowel adrianna        manual therapy techniques: Joint mobilizations, Soft tissue Mobilization, and ROM were applied to the: L shoulder for 10 minutes, including:  Passive ROM into L shoulder " "flexion in a scapular plain   Passive ROM into L shoulder ER     neuromuscular re-education activities to improve: Coordination, Kinesthetic, and Posture for 10 minutes. The following activities were included:  Shoulder isometrics (flexion, extension, abduction and adduction) w/ ball against wall 10x3" holds    cold pack for 8 minutes to left shoulder following treatment.     Patient Education and Home Exercises       Education provided:   - HEP  - Precautions, wearing sling    Written Home Exercises Provided: Patient instructed to cont prior HEP. Exercises were reviewed and Rosaura was able to demonstrate them prior to the end of the session.  Rosaura demonstrated good  understanding of the education provided. See Electronic Medical Record under Patient Instructions for exercises provided during therapy sessions    Assessment     Rosaura Mitchell had better tolerance for pulleys today. Introduced AAROM in supine per protocol. Able to passively flex arm greater than last session to ~110 degrees. Ended session with cryotherapy. Encouraged pt to implement gentle scar massage. Completed session with no adverse effects.     Rosaura Is progressing well towards her goals.   Patient prognosis is Good.     Patient will continue to benefit from skilled outpatient physical therapy to address the deficits listed in the problem list box on initial evaluation, provide pt/family education and to maximize pt's level of independence in the home and community environment.     Patient's spiritual, cultural and educational needs considered and pt agreeable to plan of care and goals.     Anticipated Barriers for therapy: compliance     Goals:  Short Term Goals: 6 weeks   Pt will be instructed in an exercise program to address functional deficits related to L UE Sx.  Improve L shoulder ROM to >/= 120 degrees of flexion, 95 degrees of abduction, 35 degrees of external rotation and 25 degrees of internal rotation.  L shoulder girdle strength to " >/= 2/5 into flexion, abduction, internal and external rotation  Pt will report a decrease in L shoulder pain to </= 4/10     Long Term Goals: 15 weeks   Pt will be independent in a HEP to assist in managing their L UE Sx.   Improve L shoulder ROM to >/= 150 degrees of flexion, 125 degrees of abduction, 50 degrees of external rotation and 35 degrees of internal rotation.  L shoulder girdle strength to >/= 2/5 into flexion, abduction, internal and external rotation  Pt will report that she is able to dress, bath and groom with greater ease  Pt will report improved functional ability through an improved score on the FOTO shoulder survey    Plan     Continue to progress per PT POC. Consider initiating supine AAROM shoulder elevation.     Edelmira Barba, PT

## 2023-11-01 ENCOUNTER — CLINICAL SUPPORT (OUTPATIENT)
Dept: REHABILITATION | Facility: HOSPITAL | Age: 48
End: 2023-11-01
Payer: MEDICAID

## 2023-11-01 DIAGNOSIS — G89.29 CHRONIC LEFT SHOULDER PAIN: Primary | ICD-10-CM

## 2023-11-01 DIAGNOSIS — M25.612 STIFFNESS OF LEFT SHOULDER JOINT: ICD-10-CM

## 2023-11-01 DIAGNOSIS — M25.512 CHRONIC LEFT SHOULDER PAIN: Primary | ICD-10-CM

## 2023-11-01 PROCEDURE — 97110 THERAPEUTIC EXERCISES: CPT | Mod: PO

## 2023-11-02 DIAGNOSIS — Z96.612 STATUS POST TOTAL SHOULDER ARTHROPLASTY, LEFT: Primary | ICD-10-CM

## 2023-11-08 ENCOUNTER — CLINICAL SUPPORT (OUTPATIENT)
Dept: REHABILITATION | Facility: HOSPITAL | Age: 48
End: 2023-11-08
Payer: MEDICAID

## 2023-11-08 DIAGNOSIS — G89.29 CHRONIC LEFT SHOULDER PAIN: Primary | ICD-10-CM

## 2023-11-08 DIAGNOSIS — M25.612 STIFFNESS OF LEFT SHOULDER JOINT: ICD-10-CM

## 2023-11-08 DIAGNOSIS — M25.512 CHRONIC LEFT SHOULDER PAIN: Primary | ICD-10-CM

## 2023-11-08 PROCEDURE — 97112 NEUROMUSCULAR REEDUCATION: CPT | Mod: PO

## 2023-11-08 PROCEDURE — 97140 MANUAL THERAPY 1/> REGIONS: CPT | Mod: PO

## 2023-11-08 PROCEDURE — 97110 THERAPEUTIC EXERCISES: CPT | Mod: PO

## 2023-11-08 NOTE — PROGRESS NOTES
"[facility]   Physical Therapy Treatment Note      Name: Rosaura Mitchell  Clinic Number: [id number]    Therapy Diagnosis:   Encounter Diagnoses   Name Primary?    Chronic left shoulder pain Yes    Stiffness of left shoulder joint          Physician: Ariel Valladares MD    Visit Date: 11/8/2023    Physician Orders: PT Eval and Treat   Medical Diagnosis from Referral: Status post total shoulder arthroplasty, left  Evaluation Date: 10/11/2023  Authorization Period Expiration: 10/09/2024  Plan of Care Expiration: 12/11/2023  Progress Note Due: 11/11/2023  Date of Surgery: 9/28/2023  Visit # / Visits authorized: 8/ 20   FOTO: 1/ 3     Precautions: Standard, Diabetes, and Hodgkin lymphoma and L total shoulder 9/28/2023       Time In: 3:07  Time Out: 4:00  Total Billable Time: 53 minutes  (4 TE medicaid)          PTA Visit #: 1/5       Subjective     Patient reports: that she does not like doing the pulleys because they hurt her shoulder.  She was compliant with home exercise program.  Response to previous treatment: tolerated well  Functional change: ongoing    Pain: 6/10  Location: left shoulder      Objective      Objective Measures updated at progress report unless specified.     Treatment   Bolded activities performed today:  Rosaura received the treatments listed below:      therapeutic exercises to develop strength, endurance, ROM, flexibility, posture, and core stabilization for 33 minutes including:    L elbow flexion x 30    Scapular retractions 3x10x2" hold (hold is equivalent to deep breath in)  Shoulder shrugs + scapular depression (lower trapezius activation) 15x2" hold (hold is equivalent to deep breath in)    Pulleys (flexion and abductions) 3' ea  AAROM shoulder flexion (supine) x30 w/ dowel adrianna  AAROM shoulder ER in neutral (supine) x30 w/ dowel adrianna        manual therapy techniques: Joint mobilizations, Soft tissue Mobilization, and ROM were applied to the: L shoulder for 10 " "minutes, including:  Passive ROM into L shoulder flexion in a scapular plain   Passive ROM into L shoulder ER     neuromuscular re-education activities to improve: Coordination, Kinesthetic, and Posture for 10 minutes. The following activities were included:  Shoulder isometrics (flexion, extension, abduction and adduction) w/ ball against wall 10x3" holds    cold pack for 8 minutes to left shoulder following treatment.     Patient Education and Home Exercises       Education provided:   - HEP  - Precautions, wearing sling    Written Home Exercises Provided: Patient instructed to cont prior HEP. Exercises were reviewed and Rosaura was able to demonstrate them prior to the end of the session.  Rosaura demonstrated good  understanding of the education provided. See Electronic Medical Record under Patient Instructions for exercises provided during therapy sessions    Assessment     Rosaura Mitchell presented to Tx today wearing her post-op sling.  Seh tolerated all Tx activities well except for reported shoulder discomfort with overhead pulleys.   Rosaura Is progressing well towards her goals.   Patient prognosis is Good.     Patient will continue to benefit from skilled outpatient physical therapy to address the deficits listed in the problem list box on initial evaluation, provide pt/family education and to maximize pt's level of independence in the home and community environment.     Patient's spiritual, cultural and educational needs considered and pt agreeable to plan of care and goals.     Anticipated Barriers for therapy: compliance     Goals:  Short Term Goals: 6 weeks   Pt will be instructed in an exercise program to address functional deficits related to L UE Sx.  Improve L shoulder ROM to >/= 120 degrees of flexion, 95 degrees of abduction, 35 degrees of external rotation and 25 degrees of internal rotation.  L shoulder girdle strength to >/= 2/5 into flexion, abduction, internal and external rotation  Pt will " report a decrease in L shoulder pain to </= 4/10     Long Term Goals: 15 weeks   Pt will be independent in a HEP to assist in managing their L UE Sx.   Improve L shoulder ROM to >/= 150 degrees of flexion, 125 degrees of abduction, 50 degrees of external rotation and 35 degrees of internal rotation.  L shoulder girdle strength to >/= 2/5 into flexion, abduction, internal and external rotation  Pt will report that she is able to dress, bath and groom with greater ease  Pt will report improved functional ability through an improved score on the FOTO shoulder survey    Plan     Continue to progress per PT POC. Consider initiating supine AAROM shoulder elevation.     Stevan Sweet, PT

## 2023-11-10 ENCOUNTER — HOSPITAL ENCOUNTER (OUTPATIENT)
Dept: RADIOLOGY | Facility: HOSPITAL | Age: 48
Discharge: HOME | End: 2023-11-10
Attending: ORTHOPAEDIC SURGERY
Payer: MEDICAID

## 2023-11-10 ENCOUNTER — OFFICE VISIT (OUTPATIENT)
Dept: ORTHOPEDICS | Facility: CLINIC | Age: 48
End: 2023-11-10
Payer: MEDICAID

## 2023-11-10 VITALS
WEIGHT: 234.38 LBS | DIASTOLIC BLOOD PRESSURE: 73 MMHG | HEIGHT: 65 IN | BODY MASS INDEX: 39.05 KG/M2 | SYSTOLIC BLOOD PRESSURE: 105 MMHG | HEART RATE: 109 BPM

## 2023-11-10 DIAGNOSIS — Z96.612 STATUS POST TOTAL SHOULDER ARTHROPLASTY, LEFT: Primary | ICD-10-CM

## 2023-11-10 DIAGNOSIS — Z96.612 STATUS POST TOTAL SHOULDER ARTHROPLASTY, LEFT: ICD-10-CM

## 2023-11-10 PROCEDURE — 73030 XR SHOULDER COMPLETE 2 OR MORE VIEWS LEFT: ICD-10-PCS | Mod: 26,LT,, | Performed by: RADIOLOGY

## 2023-11-10 PROCEDURE — 3078F DIAST BP <80 MM HG: CPT | Mod: CPTII,,, | Performed by: ORTHOPAEDIC SURGERY

## 2023-11-10 PROCEDURE — 1160F RVW MEDS BY RX/DR IN RCRD: CPT | Mod: CPTII,,, | Performed by: ORTHOPAEDIC SURGERY

## 2023-11-10 PROCEDURE — 4010F ACE/ARB THERAPY RXD/TAKEN: CPT | Mod: CPTII,,, | Performed by: ORTHOPAEDIC SURGERY

## 2023-11-10 PROCEDURE — 73030 X-RAY EXAM OF SHOULDER: CPT | Mod: 26,LT,, | Performed by: RADIOLOGY

## 2023-11-10 PROCEDURE — 3074F SYST BP LT 130 MM HG: CPT | Mod: CPTII,,, | Performed by: ORTHOPAEDIC SURGERY

## 2023-11-10 PROCEDURE — 73030 X-RAY EXAM OF SHOULDER: CPT | Mod: TC,PN,LT

## 2023-11-10 PROCEDURE — 3044F PR MOST RECENT HEMOGLOBIN A1C LEVEL <7.0%: ICD-10-PCS | Mod: CPTII,,, | Performed by: ORTHOPAEDIC SURGERY

## 2023-11-10 PROCEDURE — 99024 POSTOP FOLLOW-UP VISIT: CPT | Mod: ,,, | Performed by: ORTHOPAEDIC SURGERY

## 2023-11-10 PROCEDURE — 3078F PR MOST RECENT DIASTOLIC BLOOD PRESSURE < 80 MM HG: ICD-10-PCS | Mod: CPTII,,, | Performed by: ORTHOPAEDIC SURGERY

## 2023-11-10 PROCEDURE — 3074F PR MOST RECENT SYSTOLIC BLOOD PRESSURE < 130 MM HG: ICD-10-PCS | Mod: CPTII,,, | Performed by: ORTHOPAEDIC SURGERY

## 2023-11-10 PROCEDURE — 1159F MED LIST DOCD IN RCRD: CPT | Mod: CPTII,,, | Performed by: ORTHOPAEDIC SURGERY

## 2023-11-10 PROCEDURE — 1160F PR REVIEW ALL MEDS BY PRESCRIBER/CLIN PHARMACIST DOCUMENTED: ICD-10-PCS | Mod: CPTII,,, | Performed by: ORTHOPAEDIC SURGERY

## 2023-11-10 PROCEDURE — 4010F PR ACE/ARB THEARPY RXD/TAKEN: ICD-10-PCS | Mod: CPTII,,, | Performed by: ORTHOPAEDIC SURGERY

## 2023-11-10 PROCEDURE — 99214 OFFICE O/P EST MOD 30 MIN: CPT | Mod: PBBFAC,PN | Performed by: ORTHOPAEDIC SURGERY

## 2023-11-10 PROCEDURE — 3044F HG A1C LEVEL LT 7.0%: CPT | Mod: CPTII,,, | Performed by: ORTHOPAEDIC SURGERY

## 2023-11-10 PROCEDURE — 99999 PR PBB SHADOW E&M-EST. PATIENT-LVL IV: CPT | Mod: PBBFAC,,, | Performed by: ORTHOPAEDIC SURGERY

## 2023-11-10 PROCEDURE — 1159F PR MEDICATION LIST DOCUMENTED IN MEDICAL RECORD: ICD-10-PCS | Mod: CPTII,,, | Performed by: ORTHOPAEDIC SURGERY

## 2023-11-10 PROCEDURE — 99024 PR POST-OP FOLLOW-UP VISIT: ICD-10-PCS | Mod: ,,, | Performed by: ORTHOPAEDIC SURGERY

## 2023-11-10 PROCEDURE — 99999 PR PBB SHADOW E&M-EST. PATIENT-LVL IV: ICD-10-PCS | Mod: PBBFAC,,, | Performed by: ORTHOPAEDIC SURGERY

## 2023-11-10 RX ORDER — NYSTATIN 100000 [USP'U]/G
POWDER TOPICAL
COMMUNITY
Start: 2023-11-04

## 2023-11-10 RX ORDER — CLOTRIMAZOLE AND BETAMETHASONE DIPROPIONATE 10; .64 MG/G; MG/G
CREAM TOPICAL
COMMUNITY
Start: 2023-11-04

## 2023-11-10 RX ORDER — HYDROXYZINE HYDROCHLORIDE 50 MG/1
TABLET, FILM COATED ORAL
COMMUNITY
Start: 2023-10-14

## 2023-11-10 NOTE — PROGRESS NOTES
Patient ID:   Rosaura Mitchell is a 48 y.o. female.    Chief Complaint:   6w 1d s/p left TSA    HPI:   Patient is returning for evaluation of the left shoulder. She reports lessening of some of her pain. Pain intensity today is 4/10.    Medications:    Current Outpatient Medications:     ACCU-CHEK FASTCLIX Misc, , Disp: , Rfl:     albuterol (PROVENTIL/VENTOLIN HFA) 90 mcg/actuation inhaler, INHALE 2 PUFFS FOUR TIMES DAILY AS NEEDED FOR SHORTNESS OF BREATH, Disp: , Rfl:     amitriptyline (ELAVIL) 50 MG tablet, Take 1 tablet by mouth., Disp: , Rfl:     atorvastatin (LIPITOR) 10 MG tablet, Take 10 mg by mouth nightly., Disp: , Rfl:     BINAXNOW COVID-19 AG SELF TEST Kit, TEST AS DIRECTED TODAY, Disp: , Rfl:     cholecalciferol, vitamin D3, 125 mcg (5,000 unit) Tab, Take 5,000 Units by mouth once daily., Disp: , Rfl:     citalopram (CELEXA) 40 MG tablet, Take 40 mg by mouth., Disp: , Rfl:     clotrimazole-betamethasone 1-0.05% (LOTRISONE) cream, APPLY CREAM TOPICALLY TO AFFECTED AREA (CHEST AND ABDOMEN) 1 TO 2 TIMES DAILY FOR 10 DAYS, Disp: , Rfl:     doxepin (SINEQUAN) 10 MG capsule, TAKE 1 CAPSULE BY MOUTH AT BEDTIME AS NEEDED FOR SLEEP OR ITCHINESS, Disp: , Rfl:     enoxaparin (LOVENOX) 120 mg/0.8 mL Syrg, , Disp: , Rfl:     famotidine (PEPCID) 20 MG tablet, Take by mouth., Disp: , Rfl:     hydrOXYzine (ATARAX) 50 MG tablet, , Disp: , Rfl:     LINZESS 290 mcg Cap capsule, Take 290 mcg by mouth every morning., Disp: , Rfl:     losartan (COZAAR) 50 MG tablet, Take 50 mg by mouth., Disp: , Rfl:     NURTEC 75 mg odt, Take by mouth., Disp: , Rfl:     nystatin (MYCOSTATIN) powder, APPLY POWDER TOPICALLY TO AFFECTED AREA(TO INFRAMAMMARY BREASTS AND ABDOMINAL FOLD(S)) TWICE DAILY FOR 14 DAYS, Disp: , Rfl:     ondansetron (ZOFRAN) 4 MG tablet, Take 1 tablet (4 mg total) by mouth every 6 (six) hours as needed for Nausea., Disp: 28 tablet, Rfl: 0    ONETOUCH ULTRA BLUE TEST STRIP Strp, , Disp: , Rfl:     ONETOUCH ULTRA2 kit, ,  "Disp: , Rfl:     oxyCODONE-acetaminophen (PERCOCET) 5-325 mg per tablet, Take 1 tablet by mouth every 6 (six) hours as needed for Pain., Disp: 20 tablet, Rfl: 0    pantoprazole (PROTONIX) 40 MG tablet, Take 40 mg by mouth., Disp: , Rfl:     topiramate (TOPAMAX) 100 MG tablet, Take 100 mg by mouth 2 (two) times daily., Disp: , Rfl:     XARELTO 20 mg Tab, , Disp: , Rfl:     ZTLIDO 1.8 % PtMd, Apply 1 patch topically daily as needed., Disp: , Rfl:   No current facility-administered medications for this visit.    Facility-Administered Medications Ordered in Other Visits:     cefazolin (ANCEF) 2 gram in dextrose 5% 50 mL IVPB (premix), 2 g, Intravenous, 30 Min Pre-Op, Ariel Valladares MD, 2 g at 01/16/20 1613    Allergies:  Review of patient's allergies indicates:   Allergen Reactions    Naproxen      Hair falls out       Vitals:  /73   Pulse 109   Ht 5' 5" (1.651 m)   Wt 106.3 kg (234 lb 5.6 oz)   BMI 39.00 kg/m²     Physical Examination:  Ortho Exam   Left shoulder exam:  ROM: elevation 100, ER neutral    Imaging Studies:  I have ordered and independently reviewed the following imaging studies performed at [facility] today    Left shoulder XR series demonstrates a well seated, well aligned TSA    Assessment:  1. Status post total shoulder arthroplasty, left      Plan:  - Tylenol and Ibuprofen as needed for pain. Perhaps take before PT/OT sessions  - Advance ROM to full elevation over the next 6 weeks. Continue to limit ER to neutral to protect subscapularis repair. May add AAROM.  - Follow-up in 6 weeks with new XR of left shoulder       No follow-ups on file.          "

## 2023-11-10 NOTE — LETTER
November 10, 2023      [facility]  [address]  [address]  [place] [address]  Phone: [phone]  Fax: [phone]       Patient: Rosaura Mitchell   YOB: 1975  Date of Visit: 11/10/2023    To Whom It May Concern:    Carter Mitchell  was at [facility] on 11/10/2023. The patient is not cleared to return to work. I estimate a return to work on 12/28/23. If you have any questions or concerns, or if I can be of further assistance, please do not hesitate to contact me.    Sincerely,    Ariel Valladares MD, FAAOS    Residency   [facility] Department of Orthopedic Surgery  Assistant Orthopedic Surgeon, [employer]  Head Team Physician, [facility] [employer]  [place], [place]

## 2023-11-10 NOTE — PROGRESS NOTES
[facility]   Physical Therapy Treatment Note      Name: Rosaura Mitchell  Clinic Number: [id number]    Therapy Diagnosis:   Encounter Diagnoses   Name Primary?    Chronic left shoulder pain Yes    Stiffness of left shoulder joint        Physician: Ariel Valladares MD    Visit Date: 11/13/2023    Physician Orders: PT Eval and Treat   Medical Diagnosis from Referral: Status post total shoulder arthroplasty, left  Evaluation Date: 10/11/2023  Authorization Period Expiration: 10/09/2024  Plan of Care Expiration: 12/11/2023  Progress Note Due: 11/11/2023  Date of Surgery: 9/28/2023  Visit # / Visits authorized: 10/ 20   FOTO: 1/ 3     Precautions: Standard, Diabetes, and Hodgkin lymphoma and L total shoulder 9/28/2023       Time In: 1100  Time Out: 1200  Total Billable Time: 60 minutes  (4 TE medicaid)          PTA Visit #: 1/5       Subjective     Patient reports: that she had a f/u on Friday in which she was told she is released from wearing sling yt can prn. She says that everything looks fine on imaging. She reports stiffness and due to return to to Ortho f/u in 6 weeks   Per MD f/u 11/10/23:  - Tylenol and Ibuprofen as needed for pain. Perhaps take before PT/OT sessions  - Advance ROM to full elevation over the next 6 weeks. Continue to limit ER to neutral to protect subscapularis repair. May add AAROM.    She was compliant with home exercise program.  Response to previous treatment: tolerated well  Functional change: ongoing    Pain: 6.5/10  Location: left shoulder      Objective      Objective Measures updated at progress report unless specified.     Treatment   Bolded activities performed today:  Rosaura received the treatments listed below:      Per MD f/u 11/10/23:  - Tylenol and Ibuprofen as needed for pain. Perhaps take before PT/OT sessions  - Advance ROM to full elevation over the next 6 weeks. Continue to limit ER to neutral to protect subscapularis repair. May add  "AAROM.    therapeutic exercises to develop strength, endurance, ROM, flexibility, posture, and core stabilization for 40 minutes including:    L elbow flexion x 30    Scapular retractions 3x10x2" hold (hold is equivalent to deep breath in)  Shoulder shrugs + scapular depression (lower trapezius activation) 15x2" hold (hold is equivalent to deep breath in)    Pulleys (flexion and abductions) 3' ea  AAROM shoulder flexion (supine) x30 w/ dowel adrianna  AAROM shoulder ER in neutral (supine) x30 w/ dowel adrianna  Shoulder ABC's (supine) x2   Supine chest press w/ dowel adrianna 3 x 10  Side-lying shoulder flexion 3 x 10       manual therapy techniques: Joint mobilizations, Soft tissue Mobilization, and ROM were applied to the: L shoulder for 10 minutes, including:  Passive ROM into L shoulder flexion in a scapular plain   Passive ROM into L shoulder ER     neuromuscular re-education activities to improve: Coordination, Kinesthetic, and Posture for 10 minutes. The following activities were included:  Shoulder isometrics (flexion, extension, abduction/adduction, ER) w/ ball against wall 12x5" holds    cold pack for 8 minutes to left shoulder following treatment.     Patient Education and Home Exercises       Education provided:   - HEP  - Precautions, wearing sling    Written Home Exercises Provided: Patient instructed to cont prior HEP. Exercises were reviewed and Rosaura was able to demonstrate them prior to the end of the session.  Rosaura demonstrated good  understanding of the education provided. See Electronic Medical Record under Patient Instructions for exercises provided during therapy sessions    Assessment     Introduced more AAROM and AROM based exercises today with slight increasing pain mostly expressed with AAROM supine flexion in the pectoral region neat incision. She still has significant muscle guarding noted with PROM and was provided cueing to relax. Otherwise tolerated treatment well with no adverse effects.     Rosaura " Is progressing well towards her goals.   Patient prognosis is Good.     Patient will continue to benefit from skilled outpatient physical therapy to address the deficits listed in the problem list box on initial evaluation, provide pt/family education and to maximize pt's level of independence in the home and community environment.     Patient's spiritual, cultural and educational needs considered and pt agreeable to plan of care and goals.     Anticipated Barriers for therapy: compliance     Goals:  Short Term Goals: 6 weeks   Pt will be instructed in an exercise program to address functional deficits related to L UE Sx.  Improve L shoulder ROM to >/= 120 degrees of flexion, 95 degrees of abduction, 35 degrees of external rotation and 25 degrees of internal rotation.  L shoulder girdle strength to >/= 2/5 into flexion, abduction, internal and external rotation  Pt will report a decrease in L shoulder pain to </= 4/10     Long Term Goals: 15 weeks   Pt will be independent in a HEP to assist in managing their L UE Sx.   Improve L shoulder ROM to >/= 150 degrees of flexion, 125 degrees of abduction, 50 degrees of external rotation and 35 degrees of internal rotation.  L shoulder girdle strength to >/= 2/5 into flexion, abduction, internal and external rotation  Pt will report that she is able to dress, bath and groom with greater ease  Pt will report improved functional ability through an improved score on the FOTO shoulder survey    Plan     Continue to progress per PT POC.     Edelmira Barba, PT

## 2023-11-13 ENCOUNTER — CLINICAL SUPPORT (OUTPATIENT)
Dept: REHABILITATION | Facility: HOSPITAL | Age: 48
End: 2023-11-13
Payer: MEDICAID

## 2023-11-13 DIAGNOSIS — M25.512 CHRONIC LEFT SHOULDER PAIN: Primary | ICD-10-CM

## 2023-11-13 DIAGNOSIS — M25.612 STIFFNESS OF LEFT SHOULDER JOINT: ICD-10-CM

## 2023-11-13 DIAGNOSIS — G89.29 CHRONIC LEFT SHOULDER PAIN: Primary | ICD-10-CM

## 2023-11-13 PROCEDURE — 97110 THERAPEUTIC EXERCISES: CPT | Mod: PO

## 2023-11-17 ENCOUNTER — CLINICAL SUPPORT (OUTPATIENT)
Dept: REHABILITATION | Facility: HOSPITAL | Age: 48
End: 2023-11-17
Payer: MEDICAID

## 2023-11-17 DIAGNOSIS — G89.29 CHRONIC LEFT SHOULDER PAIN: Primary | ICD-10-CM

## 2023-11-17 DIAGNOSIS — M25.512 CHRONIC LEFT SHOULDER PAIN: Primary | ICD-10-CM

## 2023-11-17 DIAGNOSIS — M25.612 STIFFNESS OF LEFT SHOULDER JOINT: ICD-10-CM

## 2023-11-17 PROCEDURE — 97110 THERAPEUTIC EXERCISES: CPT | Mod: PO

## 2023-11-17 NOTE — PROGRESS NOTES
"[facility]   Physical Therapy Treatment Note      Name: Rosaura Mitchell  Clinic Number: [id number]    Therapy Diagnosis:   Encounter Diagnoses   Name Primary?    Chronic left shoulder pain Yes    Stiffness of left shoulder joint        Physician: Ariel Valladares MD    Visit Date: 11/17/2023    Physician Orders: PT Eval and Treat   Medical Diagnosis from Referral: Status post total shoulder arthroplasty, left  Evaluation Date: 10/11/2023  Authorization Period Expiration: 10/09/2024  Plan of Care Expiration: 12/11/2023  Progress Note Due: 11/11/2023  Date of Surgery: 9/28/2023  Visit # / Visits authorized: 10/ 20   FOTO: 1/ 3     Precautions: Standard, Diabetes, and Hodgkin lymphoma and L total shoulder 9/28/2023       Time In: 900  Time Out: 1000  Total Billable Time: 60 minutes  (4 TE medicaid)          PTA Visit #: 1/5       Subjective     Patient reports: Pt reported that she has been out of her sling for about a week.  She feels some "tenderness" in the L shoulder and L upper arm  Per MD f/u 11/10/23:  - Tylenol and Ibuprofen as needed for pain. Perhaps take before PT/OT sessions  - Advance ROM to full elevation over the next 6 weeks. Continue to limit ER to neutral to protect subscapularis repair. May add AAROM.    She was compliant with home exercise program.  Response to previous treatment: tolerated well  Functional change: ongoing    Pain: 7/10  Location: left shoulder      Objective      Objective Measures updated at progress report unless specified.      Observation: [facility] indepen[facility]tly without a post-op sling on the L UE     Posture: protracted L shoulder girdle in a post-op sling        Passive Range of Motion:   Shoulder Left Right   Flexion 120 nt   Abduction nt nt   ER at 45 12 nt   ER at 90 nt nt   IR 50 (to abdomen) nt      Active Range of Motion:   Shoulder Left Right   Flexion nt nt   Abduction nt t   ER at 0 nt nt   ER at 90 nt nt   IR nt " "nt      Upper Extremity Strength    (L) UE (R) UE   Shoulder flexion: nt nt   Shoulder Abduction: nt nt   Shoulder ER nt nt   Shoulder IR nt nt   Lower Trap nt nt   Middle Trap nt nt   Rhomboids nt nt         Joint Mobility: L GH joint hypomobile     Palpation: tender over anterior L shoulder incision     Sensation: intact     Flexibility: NT     Intake Outcome Measure for FOTO Shoulder Survey     Therapist reviewed FOTO scores for Rosaura Mitchell on 10/11/2023.   FOTO report - see Media section or FOTO account episode details.     Intake Score: 48%         Treatment   Bolded activities performed today:  Rosaura received the treatments listed below:      Per MD f/u 11/10/23:  - Tylenol and Ibuprofen as needed for pain. Perhaps take before PT/OT sessions  - Advance ROM to full elevation over the next 6 weeks. Continue to limit ER to neutral to protect subscapularis repair. May add AAROM.    therapeutic exercises to develop strength, endurance, ROM, flexibility, posture, and core stabilization for 40 minutes including:    L elbow flexion x 30    Scapular retractions 3x10x2" hold (hold is equivalent to deep breath in)  Shoulder shrugs + scapular depression (lower trapezius activation) 15x2" hold (hold is equivalent to deep breath in)    Pulleys (flexion and abductions) 3' ea  AAROM shoulder flexion (supine) x30 w/ dowel adrianna  AAROM shoulder ER in neutral (supine) x30 w/ dowel adrianna  Shoulder ABC's (supine) x2   Supine chest press w/ dowel adrianna 3 x 10  Side-lying shoulder flexion 3 x 10 with treking pole       manual therapy techniques: Joint mobilizations, Soft tissue Mobilization, and ROM were applied to the: L shoulder for 10 minutes, including:  Passive ROM into L shoulder flexion in a scapular plain   Passive ROM into L shoulder ER     neuromuscular re-education activities to improve: Coordination, Kinesthetic, and Posture for 10 minutes. The following activities were included:  Shoulder isometrics (flexion, extension, " "abduction/adduction, ER) w/ ball against wall 12x5" holds    cold pack for 10 minutes to left shoulder following treatment.     Patient Education and Home Exercises       Education provided:   - HEP  - Precautions, wearing sling    Written Home Exercises Provided: Patient instructed to cont prior HEP. Exercises were reviewed and Rosaura was able to demonstrate them prior to the end of the session.  Rosaura demonstrated good  understanding of the education provided. See Electronic Medical Record under Patient Instructions for exercises provided during therapy sessions    Assessment     Pt presents without her sling as it has been discharged for continuous use.  Pt reports "tenderness" in her L shoulder prior to Tx.  Pt continued with AA exercises adding AA L shoulder flexion in side lying. She tolerated all Tx activities well today.  L shoulder PROM is improving into flexion.  External rotation remains about the same.  Rosaura Is progressing well towards her goals.   Patient prognosis is Good.     Patient will continue to benefit from skilled outpatient physical therapy to address the deficits listed in the problem list box on initial evaluation, provide pt/family education and to maximize pt's level of independence in the home and community environment.     Patient's spiritual, cultural and educational needs considered and pt agreeable to plan of care and goals.     Anticipated Barriers for therapy: compliance     Goals:  Short Term Goals: 6 weeks   Pt will be instructed in an exercise program to address functional deficits related to L UE Sx.  Improve L shoulder ROM to >/= 120 degrees of flexion, 95 degrees of abduction, 35 degrees of external rotation and 25 degrees of internal rotation.  L shoulder girdle strength to >/= 2/5 into flexion, abduction, internal and external rotation  Pt will report a decrease in L shoulder pain to </= 4/10     Long Term Goals: 15 weeks   Pt will be independent in a HEP to assist in " managing their L UE Sx.   Improve L shoulder ROM to >/= 150 degrees of flexion, 125 degrees of abduction, 50 degrees of external rotation and 35 degrees of internal rotation.  L shoulder girdle strength to >/= 2/5 into flexion, abduction, internal and external rotation  Pt will report that she is able to dress, bath and groom with greater ease  Pt will report improved functional ability through an improved score on the FOTO shoulder survey    Plan     Continue to progress per PT POC.     Stevan Sweet, PT

## 2023-11-20 ENCOUNTER — CLINICAL SUPPORT (OUTPATIENT)
Dept: REHABILITATION | Facility: HOSPITAL | Age: 48
End: 2023-11-20
Payer: MEDICAID

## 2023-11-20 DIAGNOSIS — G89.29 CHRONIC LEFT SHOULDER PAIN: Primary | ICD-10-CM

## 2023-11-20 DIAGNOSIS — M25.512 CHRONIC LEFT SHOULDER PAIN: Primary | ICD-10-CM

## 2023-11-20 DIAGNOSIS — M25.612 STIFFNESS OF LEFT SHOULDER JOINT: ICD-10-CM

## 2023-11-20 PROCEDURE — 97110 THERAPEUTIC EXERCISES: CPT | Mod: PO

## 2023-11-20 NOTE — PROGRESS NOTES
"[facility]   Physical Therapy Treatment Note      Name: Rosaura Mitchell  Clinic Number: [id number]    Therapy Diagnosis:   Encounter Diagnoses   Name Primary?    Chronic left shoulder pain Yes    Stiffness of left shoulder joint        Physician: Ariel Valladares MD    Visit Date: 11/20/2023    Physician Orders: PT Eval and Treat   Medical Diagnosis from Referral: Status post total shoulder arthroplasty, left  Evaluation Date: 10/11/2023  Authorization Period Expiration: 10/09/2024  Plan of Care Expiration: 12/11/2023  Progress Note Due: 11/11/2023  Date of Surgery: 9/28/2023  Visit # / Visits authorized: 10/ 20   FOTO: 1/ 3     Precautions: Standard, Diabetes, and Hodgkin lymphoma and L total shoulder 9/28/2023       Time In: 1005  Time Out: 1115  Total Billable Time: 60 minutes  (4 TE medicaid)          PTA Visit #: 1/5       Subjective     Patient reports: "I still have some pains every now and then. I can't even wash my hair"  Per MD f/u 11/10/23:  - Tylenol and Ibuprofen as needed for pain. Perhaps take before PT/OT sessions  - Advance ROM to full elevation over the next 6 weeks. Continue to limit ER to neutral to protect subscapularis repair. May add AAROM.    She was compliant with home exercise program.  Response to previous treatment: tolerated well  Functional change: ongoing    Pain: 7/10  Location: left shoulder      Objective      Objective Measures updated at progress report unless specified.      Observation: pt entered [facility] amb[facility]ting indepenenntly without a post-op sling on the L UE     Posture: protracted L shoulder girdle in a post-op sling        Passive Range of Motion:   Shoulder Left Right   Flexion 120 nt   Abduction nt nt   ER at 45 12 nt   ER at 90 nt nt   IR 50 (to abdomen) nt      Active Range of Motion:   Shoulder Left Right   Flexion nt nt   Abduction nt t   ER at 0 nt nt   ER at 90 nt nt   IR nt nt      Upper Extremity Strength    (L) UE (R) UE " "  Shoulder flexion: nt nt   Shoulder Abduction: nt nt   Shoulder ER nt nt   Shoulder IR nt nt   Lower Trap nt nt   Middle Trap nt nt   Rhomboids nt nt         Joint Mobility: L GH joint hypomobile     Palpation: tender over anterior L shoulder incision     Sensation: intact     Flexibility: NT     Intake Outcome Measure for FOTO Shoulder Survey     Therapist reviewed FOTO scores for Rosaura Mitchell on 10/11/2023.   FOTO report - see Media section or FOTO account episode details.     Intake Score: 48%         Treatment   Bolded activities performed today:  Rosaura received the treatments listed below:      Per MD f/u 11/10/23:  - Tylenol and Ibuprofen as needed for pain. Perhaps take before PT/OT sessions  - Advance ROM to full elevation over the next 6 weeks. Continue to limit ER to neutral to protect subscapularis repair. May add AAROM.    therapeutic exercises to develop strength, endurance, ROM, flexibility, posture, and core stabilization for 40 minutes including:    L elbow flexion x 30    Scapular retractions 3x10x2" hold (hold is equivalent to deep breath in)  Shoulder shrugs + scapular depression (lower trapezius activation) 15x2" hold (hold is equivalent to deep breath in)    Pulleys (flexion and abductions) 3' ea  AAROM shoulder flexion (supine) x30 w/ dowel adrianna  AAROM shoulder ER in neutral (supine) x30 w/ dowel adrianna  Shoulder ABC's (supine) x2   Supine chest press w/ dowel adrianna 2 x 10  Side-lying shoulder flexion 3 x 10 with treking pole       manual therapy techniques: Joint mobilizations, Soft tissue Mobilization, and ROM were applied to the: L shoulder for 10 minutes, including:  Passive ROM into L shoulder flexion in a scapular plain   Passive ROM into L shoulder ER     neuromuscular re-education activities to improve: Coordination, Kinesthetic, and Posture for 10 minutes. The following activities were included:  Shoulder isometrics (flexion, extension, abduction/adduction, ER) w/ ball against wall " "12x5" holds    cold pack for 10 minutes to left shoulder following treatment.     Patient Education and Home Exercises       Education provided:   - HEP  - Precautions, wearing sling    Written Home Exercises Provided: Patient instructed to cont prior HEP. Exercises were reviewed and Rosaura was able to demonstrate them prior to the end of the session.  Rosaura demonstrated good  understanding of the education provided. See Electronic Medical Record under Patient Instructions for exercises provided during therapy sessions    Assessment     Pt continues to report tenderness and even intermittent pain near incision. Encouraged pt to massage scar to help desensitize area. Treatment continues to focus on improving ROM with AAROM activities and progression as tolerated. Completed session with no new or exacerbating symptoms.     Rosaura Is progressing well towards her goals.   Patient prognosis is Good.     Patient will continue to benefit from skilled outpatient physical therapy to address the deficits listed in the problem list box on initial evaluation, provide pt/family education and to maximize pt's level of independence in the home and community environment.     Patient's spiritual, cultural and educational needs considered and pt agreeable to plan of care and goals.     Anticipated Barriers for therapy: compliance     Goals:  Short Term Goals: 6 weeks   Pt will be instructed in an exercise program to address functional deficits related to L UE Sx.  Improve L shoulder ROM to >/= 120 degrees of flexion, 95 degrees of abduction, 35 degrees of external rotation and 25 degrees of internal rotation.  L shoulder girdle strength to >/= 2/5 into flexion, abduction, internal and external rotation  Pt will report a decrease in L shoulder pain to </= 4/10     Long Term Goals: 15 weeks   Pt will be independent in a HEP to assist in managing their L UE Sx.   Improve L shoulder ROM to >/= 150 degrees of flexion, 125 degrees of " abduction, 50 degrees of external rotation and 35 degrees of internal rotation.  L shoulder girdle strength to >/= 2/5 into flexion, abduction, internal and external rotation  Pt will report that she is able to dress, bath and groom with greater ease  Pt will report improved functional ability through an improved score on the FOTO shoulder survey    Plan     Continue to progress per PT POC. Consider initiating seated shoulder flexion w/ elbow bent    Edelmira Barba, PT

## 2023-11-21 NOTE — PROGRESS NOTES
[facility]   Physical Therapy Treatment Note      Name: Rosaura Mitchell  Clinic Number: [id number]    Therapy Diagnosis:   Encounter Diagnoses   Name Primary?    Chronic left shoulder pain Yes    Stiffness of left shoulder joint          Physician: Ariel Valladares MD    Visit Date: 11/22/2023    Physician Orders: PT Eval and Treat   Medical Diagnosis from Referral: Status post total shoulder arthroplasty, left  Evaluation Date: 10/11/2023  Authorization Period Expiration: 10/09/2024  Plan of Care Expiration: 12/11/2023  Progress Note Completed : 11/17/2023  Next Progress Note Due: 12/17/23  Date of Surgery: 9/28/2023  Visit # / Visits authorized: 12 / 20   FOTO: 1/ 3     Precautions: Standard, Diabetes, and Hodgkin lymphoma and L total shoulder 9/28/2023       Time In: 1005  Time Out: 1105  Total Billable Time:  60 minutes  (4 TE medicaid)          PTA Visit #: 1/5       Subjective     Patient reports: soreness   Per MD f/u 11/10/23:  - Tylenol and Ibuprofen as needed for pain. Perhaps take before PT/OT sessions  - Advance ROM to full elevation over the next 6 weeks. Continue to limit ER to neutral to protect subscapularis repair. May add AAROM.    She was compliant with home exercise program.  Response to previous treatment: tolerated well  Functional change: ongoing    Pain: 7/10  Location: left shoulder      Objective      Objective Measures updated at progress report unless specified.        Treatment   Bolded activities performed today:  Rosaura received the treatments listed below:      Per MD f/u 11/10/23:  - Tylenol and Ibuprofen as needed for pain. Perhaps take before PT/OT sessions  - Advance ROM to full elevation over the next 6 weeks. Continue to limit ER to neutral to protect subscapularis repair. May add AAROM.    therapeutic exercises to develop strength, endurance, ROM, flexibility, posture, and core stabilization for 32 minutes including:    L elbow flexion x  "30    Pulleys (flexion and abductions) 3' ea  AAROM shoulder flexion (supine) 20x3" hold at p! Free end-range w/ dowel adrianna  AAROM shoulder ER in neutral (supine) x30 w/ dowel adrianna  AROM shoulder flexion (supine) x12 w/ passive over-pressure  Shoulder ABC's (supine) x1  Supine chest press w/ dowel adrianna 2 x 10  Side-lying shoulder flexion 3 x 10 with treking pole  Prone scapular retraction 15x3"      manual therapy techniques: Joint mobilizations, Soft tissue Mobilization, and ROM were applied to the: L shoulder for 8 minutes, including:  Passive ROM into L shoulder flexion in a scapular plain   Passive ROM into L shoulder ER     neuromuscular re-education activities to improve: Coordination, Kinesthetic, and Posture for 10 minutes. The following activities were included:  Shoulder isometrics (flexion, extension, abduction/adduction, ER/IR) w/ ball against wall 15x5" holds    cold pack for 10 minutes to left shoulder following treatment.     Patient Education and Home Exercises       Education provided:   - HEP  - Precautions, wearing sling    Written Home Exercises Provided: Patient instructed to cont prior HEP. Exercises were reviewed and Rosaura was able to demonstrate them prior to the end of the session.  Rosaura demonstrated good  understanding of the education provided. See Electronic Medical Record under Patient Instructions for exercises provided during therapy sessions    Assessment     Pt continues to report tenderness and even intermittent pain near incision. Introduced prone scapular retraction today with good tolerance. Pt demonstrates improved shoulder AROM for flexion, ER and IR. Completed session with cryotherapy for soreness but no new or exacerbating symptoms.     Rosaura Is progressing well towards her goals.   Patient prognosis is Good.     Patient will continue to benefit from skilled outpatient physical therapy to address the deficits listed in the problem list box on initial evaluation, provide " pt/family education and to maximize pt's level of independence in the home and community environment.     Patient's spiritual, cultural and educational needs considered and pt agreeable to plan of care and goals.     Anticipated Barriers for therapy: compliance     Goals:  Short Term Goals: 6 weeks   Pt will be instructed in an exercise program to address functional deficits related to L UE Sx.  Improve L shoulder ROM to >/= 120 degrees of flexion, 95 degrees of abduction, 35 degrees of external rotation and 25 degrees of internal rotation.  L shoulder girdle strength to >/= 2/5 into flexion, abduction, internal and external rotation  Pt will report a decrease in L shoulder pain to </= 4/10     Long Term Goals: 15 weeks   Pt will be independent in a HEP to assist in managing their L UE Sx.   Improve L shoulder ROM to >/= 150 degrees of flexion, 125 degrees of abduction, 50 degrees of external rotation and 35 degrees of internal rotation.  L shoulder girdle strength to >/= 2/5 into flexion, abduction, internal and external rotation  Pt will report that she is able to dress, bath and groom with greater ease  Pt will report improved functional ability through an improved score on the FOTO shoulder survey    Plan     Continue to progress per PT POC. Consider initiating serratus punchfeliberto Barba, PT

## 2023-11-22 ENCOUNTER — CLINICAL SUPPORT (OUTPATIENT)
Dept: REHABILITATION | Facility: HOSPITAL | Age: 48
End: 2023-11-22
Payer: MEDICAID

## 2023-11-22 DIAGNOSIS — M25.612 STIFFNESS OF LEFT SHOULDER JOINT: ICD-10-CM

## 2023-11-22 DIAGNOSIS — M25.512 CHRONIC LEFT SHOULDER PAIN: Primary | ICD-10-CM

## 2023-11-22 DIAGNOSIS — G89.29 CHRONIC LEFT SHOULDER PAIN: Primary | ICD-10-CM

## 2023-11-22 PROCEDURE — 97110 THERAPEUTIC EXERCISES: CPT | Mod: PO

## 2023-11-29 ENCOUNTER — CLINICAL SUPPORT (OUTPATIENT)
Dept: REHABILITATION | Facility: HOSPITAL | Age: 48
End: 2023-11-29
Payer: MEDICAID

## 2023-11-29 DIAGNOSIS — G89.29 CHRONIC LEFT SHOULDER PAIN: Primary | ICD-10-CM

## 2023-11-29 DIAGNOSIS — M25.512 CHRONIC LEFT SHOULDER PAIN: Primary | ICD-10-CM

## 2023-11-29 DIAGNOSIS — M25.612 STIFFNESS OF LEFT SHOULDER JOINT: ICD-10-CM

## 2023-11-29 PROCEDURE — 97110 THERAPEUTIC EXERCISES: CPT | Mod: PO

## 2023-11-29 NOTE — PROGRESS NOTES
[facility]   Physical Therapy Treatment Note      Name: Rosaura Mitchell  Clinic Number: [id number]    Therapy Diagnosis:   Encounter Diagnoses   Name Primary?    Chronic left shoulder pain Yes    Stiffness of left shoulder joint          Physician: Ariel Valladares MD    Visit Date: 11/29/2023    Physician Orders: PT Eval and Treat   Medical Diagnosis from Referral: Status post total shoulder arthroplasty, left  Evaluation Date: 10/11/2023  Authorization Period Expiration: 10/09/2024  Plan of Care Expiration: 12/11/2023  Progress Note Completed : 11/17/2023  Next Progress Note Due: 12/17/23  Date of Surgery: 9/28/2023  Visit # / Visits authorized: 13 / 20   FOTO: 1/ 3     Precautions: Standard, Diabetes, and Hodgkin lymphoma and L total shoulder 9/28/2023       Time In: 1225  Time Out: 1:25  Total Billable Time:  60 minutes  (4 TE medicaid)          PTA Visit #: 1/5       Subjective     Patient reports: that her L shoulder is feeling OK today.  Per MD f/u 11/10/23:  - Tylenol and Ibuprofen as needed for pain. Perhaps take before PT/OT sessions  - Advance ROM to full elevation over the next 6 weeks. Continue to limit ER to neutral to protect subscapularis repair. May add AAROM.    She was compliant with home exercise program.  Response to previous treatment: tolerated well  Functional change: ongoing    Pain: 7/10  Location: left shoulder      Objective      Objective Measures updated at progress report unless specified.        Treatment   Bolded activities performed today:  Rosaura received the treatments listed below:      Per MD f/u 11/10/23:  - Tylenol and Ibuprofen as needed for pain. Perhaps take before PT/OT sessions  - Advance ROM to full elevation over the next 6 weeks. Continue to limit ER to neutral to protect subscapularis repair. May add AAROM.    therapeutic exercises to develop strength, endurance, ROM, flexibility, posture, and core stabilization for 32 minutes  "including:    L elbow flexion x 30    Pulleys (flexion and abductions) 3' ea  AAROM shoulder flexion (supine) 20x3" hold at p! Free end-range w/ dowel adrianna  AAROM shoulder ER in neutral (supine) x30 w/ dowel adrianna to zero degrees  AROM shoulder flexion (supine) x12 w/ passive over-pressure  Shoulder ABC's (supine) x1  Supine chest press w/ dowel adrianna 2 x 10 - with serratus punch    Side-lying shoulder flexion 3 x 10 with treking pole   Prone scapular retraction 15x3" - not performed today     manual therapy techniques: Joint mobilizations, Soft tissue Mobilization, and ROM were applied to the: L shoulder for 8 minutes, including:  Passive ROM into L shoulder flexion in a scapular plain   Passive ROM into L shoulder ER  PNF scapular diagonals 2 x 10 in all directions -     neuromuscular re-education activities to improve: Coordination, Kinesthetic, and Posture for 10 minutes. The following activities were included:  Shoulder isometrics (flexion, extension, abduction/adduction, ER/IR) w/ ball against wall 15x5" holds    cold pack for 10 minutes to left shoulder following treatment.     Patient Education and Home Exercises       Education provided:   - HEP  - Precautions, wearing sling    Written Home Exercises Provided: Patient instructed to cont prior HEP. Exercises were reviewed and Rosaura was able to demonstrate them prior to the end of the session.  Rosaura demonstrated good  understanding of the education provided. See Electronic Medical Record under Patient Instructions for exercises provided during therapy sessions    Assessment     Pt reports less pain overall today.  She remains tender to palpation in the anterior L shoulder. Pt did not request ice following Tx today.  Rosaura Is progressing well towards her goals.   Patient prognosis is Good.     Patient will continue to benefit from skilled outpatient physical therapy to address the deficits listed in the problem list box on initial evaluation, provide pt/family " education and to maximize pt's level of independence in the home and community environment.     Patient's spiritual, cultural and educational needs considered and pt agreeable to plan of care and goals.     Anticipated Barriers for therapy: compliance     Goals:  Short Term Goals: 6 weeks   Pt will be instructed in an exercise program to address functional deficits related to L UE Sx.  Improve L shoulder ROM to >/= 120 degrees of flexion, 95 degrees of abduction, 35 degrees of external rotation and 25 degrees of internal rotation.  L shoulder girdle strength to >/= 2/5 into flexion, abduction, internal and external rotation  Pt will report a decrease in L shoulder pain to </= 4/10     Long Term Goals: 15 weeks   Pt will be independent in a HEP to assist in managing their L UE Sx.   Improve L shoulder ROM to >/= 150 degrees of flexion, 125 degrees of abduction, 50 degrees of external rotation and 35 degrees of internal rotation.  L shoulder girdle strength to >/= 2/5 into flexion, abduction, internal and external rotation  Pt will report that she is able to dress, bath and groom with greater ease  Pt will report improved functional ability through an improved score on the FOTO shoulder survey    Plan     Continue to progress per PT POC. Consider initiating serratus punches    Stevan Sweet, PT

## 2023-12-01 ENCOUNTER — CLINICAL SUPPORT (OUTPATIENT)
Dept: REHABILITATION | Facility: HOSPITAL | Age: 48
End: 2023-12-01
Payer: MEDICAID

## 2023-12-01 DIAGNOSIS — M25.612 STIFFNESS OF LEFT SHOULDER JOINT: ICD-10-CM

## 2023-12-01 DIAGNOSIS — M25.512 CHRONIC LEFT SHOULDER PAIN: Primary | ICD-10-CM

## 2023-12-01 DIAGNOSIS — G89.29 CHRONIC LEFT SHOULDER PAIN: Primary | ICD-10-CM

## 2023-12-01 PROCEDURE — 97110 THERAPEUTIC EXERCISES: CPT | Mod: PO

## 2023-12-01 NOTE — PROGRESS NOTES
[facility]   Physical Therapy Treatment Note      Name: Rosaura Mitchell  Clinic Number: [id number]    Therapy Diagnosis:   Encounter Diagnoses   Name Primary?    Chronic left shoulder pain Yes    Stiffness of left shoulder joint        Physician: Ariel Valladares MD    Visit Date: 12/1/2023    Physician Orders: PT Eval and Treat   Medical Diagnosis from Referral: Status post total shoulder arthroplasty, left  Evaluation Date: 10/11/2023  Authorization Period Expiration: 10/09/2024  Plan of Care Expiration: 12/11/2023  Progress Note Completed : 11/17/2023  Next Progress Note Due: 12/17/23  Date of Surgery: 9/28/2023  Visit # / Visits authorized: 13 / 20   FOTO: 1/ 3     Precautions: Standard, Diabetes, and Hodgkin lymphoma and L total shoulder 9/28/2023       Time In: 0905 AM  Time Out: 1002 AM  Total Billable Time:  57 minutes  (4 TE medicaid)          PTA Visit #: 1/5       Subjective     Patient reports: that she woke up with some pain in the shoulder and didn't know if she slept wrong. She says that every morning she tries to do her arm raises but this morning she could not lift her arm at all.  Per MD f/u 11/10/23:  - Tylenol and Ibuprofen as needed for pain. Perhaps take before PT/OT sessions  - Advance ROM to full elevation over the next 6 weeks. Continue to limit ER to neutral to protect subscapularis repair. May add AAROM.    She was compliant with home exercise program.  Response to previous treatment: tolerated well  Functional change: ongoing    Pain: 7/10  Location: left shoulder      Objective      Objective Measures updated at progress report unless specified.        Treatment     Rosaura received the treatments listed below:      Per MD f/u 11/10/23:  - Tylenol and Ibuprofen as needed for pain. Perhaps take before PT/OT sessions  - Advance ROM to full elevation over the next 6 weeks. Continue to limit ER to neutral to protect subscapularis repair. May add  "AAROM.    therapeutic exercises to develop strength, endurance, ROM, flexibility, posture, and core stabilization for 37 minutes including:    AAROM shoulder ER in neutral (supine) x30 w/ dowel adrianna to zero degrees  AROM shoulder flexion (supine) x20 w/ passive over-pressure  Ball Roll on Wall (flexion to tolerance) x20    Supine chest press w/ dowel adrianna 2 x 10 - with serratus punch  Sleeper Stretch 3 x 20"  Shoulder flexion w/ elbow flexion (seated) x20  Prone Row    NOT PERFORMED TODAY:  Pulleys (flexion and abductions) 3' ea  Side-lying shoulder flexion 3 x 10 with treking pole   Prone scapular retraction 15x3"  Shoulder ABC's (supine) x1     manual therapy techniques: Joint mobilizations, Soft tissue Mobilization, and ROM were applied to the: L shoulder for 10 minutes, including:  Passive ROM into L shoulder flexion in a scapular plain, abduction to 90 deg, IR to tolerance   PNF scapular diagonals D2 flexion and extension     neuromuscular re-education activities to improve: Coordination, Kinesthetic, and Posture for 10 minutes. The following activities were included:  Shoulder isometrics (flexion, extension, abduction/adduction, ER/IR) w/ ball against wall 20x5" holds    cold pack for 0 minutes to left shoulder following treatment.     Patient Education and Home Exercises       Education provided:   - HEP  - Precautions, wearing sling    Written Home Exercises Provided: Patient instructed to cont prior HEP. Exercises were reviewed and Rosaura was able to demonstrate them prior to the end of the session.  Rosaura demonstrated good  understanding of the education provided. See Electronic Medical Record under Patient Instructions for exercises provided during therapy sessions    Assessment     Patient hs not yet achieved full shoulder AROM in any plane. She still has quite a bit of pain at available end-range of flexion. Initiated active shoulder flexion in sitting today to 90 degrees and ball roll on wall with a lot " of compensatory techniques from upper trapezius and trunk extension. Pt continues to point out intermittent pain in the medial upper arm near subscapularis repairing region. Cryotherapy was not provided today.     Rosaura Is progressing well towards her goals.   Patient prognosis is Good.     Patient will continue to benefit from skilled outpatient physical therapy to address the deficits listed in the problem list box on initial evaluation, provide pt/family education and to maximize pt's level of independence in the home and community environment.     Patient's spiritual, cultural and educational needs considered and pt agreeable to plan of care and goals.     Anticipated Barriers for therapy: compliance     Goals:  Short Term Goals: 6 weeks   Pt will be instructed in an exercise program to address functional deficits related to L UE Sx.  Improve L shoulder ROM to >/= 120 degrees of flexion, 95 degrees of abduction, 35 degrees of external rotation and 25 degrees of internal rotation.  L shoulder girdle strength to >/= 2/5 into flexion, abduction, internal and external rotation  Pt will report a decrease in L shoulder pain to </= 4/10     Long Term Goals: 15 weeks   Pt will be independent in a HEP to assist in managing their L UE Sx.   Improve L shoulder ROM to >/= 150 degrees of flexion, 125 degrees of abduction, 50 degrees of external rotation and 35 degrees of internal rotation.  L shoulder girdle strength to >/= 2/5 into flexion, abduction, internal and external rotation  Pt will report that she is able to dress, bath and groom with greater ease  Pt will report improved functional ability through an improved score on the FOTO shoulder survey    Plan     Continue to progress per PT POC. Initiate side-lying shoulder adduction stretch, prone rows and biceps curls w/ dumbbells next visit.    Edelmira Barba, PT

## 2023-12-01 NOTE — PROGRESS NOTES
[facility]   Physical Therapy Treatment Note      Name: Rosaura Mitchell  Clinic Number: [id number]    Therapy Diagnosis:   Encounter Diagnoses   Name Primary?    Chronic left shoulder pain Yes    Stiffness of left shoulder joint          Physician: Ariel Valladares MD    Visit Date: 12/4/2023    Physician Orders: PT Eval and Treat   Medical Diagnosis from Referral: Status post total shoulder arthroplasty, left  Evaluation Date: 10/11/2023  Authorization Period Expiration: 10/09/2024  Plan of Care Expiration: 12/11/2023  Progress Note Completed : 11/17/2023  Next Progress Note Due: 12/17/23  Date of Surgery: 9/28/2023  Visit # / Visits authorized: 13 / 20   FOTO: 1/ 3     Precautions: Standard, Diabetes, and Hodgkin lymphoma and L total shoulder 9/28/2023       Time In: 0900 AM  Time Out: 1000 AM  Total Billable Time:  60 minutes  (4 TE medicaid)          PTA Visit #: 1/5       Subjective     Patient reports: stiffness in shoulder area. She says that she can lift it better but that it is slow    Per MD f/u 11/10/23:  - Tylenol and Ibuprofen as needed for pain. Perhaps take before PT/OT sessions  - Advance ROM to full elevation over the next 6 weeks. Continue to limit ER to neutral to protect subscapularis repair. May add AAROM.    She was compliant with home exercise program.  Response to previous treatment: tolerated well  Functional change: ongoing    Pain: 6/10  Location: left shoulder      Objective      Objective Measures updated at progress report unless specified.        Treatment     Rosaura received the treatments listed below:      Per MD f/u 11/10/23:  - Tylenol and Ibuprofen as needed for pain. Perhaps take before PT/OT sessions  - Advance ROM to full elevation over the next 6 weeks. Continue to limit ER to neutral to protect subscapularis repair. May add AAROM.    therapeutic exercises to develop strength, endurance, ROM, flexibility, posture, and core stabilization for 40  "minutes including:    Pulleys (flexion and abductions) 3' ea  AAROM shoulder ER in neutral (supine) x30 w/ dowel adrianna to zero degrees  AROM shoulder flexion (supine) x20 w/ passive over-pressure  Wall walks (flexion to tolerance) x20    Supine chest press w/ Red #3 Thera-wand 2 x 10 - with serratus punch  Sleeper Stretch 3 x 20"  Side-lying shoulder adduction stretch 3 x 20"  Shoulder flexion w/ elbow flexion (seated) x20  Prone Row 2x10 , 1# L  Prone scapular retraction 10x5"  Biceps Curls 2x10, 1#      NOT PERFORMED TODAY:    Side-lying shoulder flexion 3 x 10 with treking pole     Shoulder ABC's (supine) x1     manual therapy techniques: Joint mobilizations, Soft tissue Mobilization, and ROM were applied to the: L shoulder for 12 minutes, including:  Passive ROM into L shoulder flexion in a scapular plain, abduction to 90 deg, IR to tolerance   PNF scapular diagonals D2 flexion and extension     neuromuscular re-education activities to improve: Coordination, Kinesthetic, and Posture for 8 minutes. The following activities were included:  Shoulder isometrics (flexion, extension, abduction/adduction, ER/IR) w/ ball against wall 15x5" holds    cold pack for 0 minutes to left shoulder following treatment.     Patient Education and Home Exercises       Education provided:   - HEP  - Precautions, wearing sling    Written Home Exercises Provided: Patient instructed to cont prior HEP. Exercises were reviewed and Rosaura was able to demonstrate them prior to the end of the session.  Rosaura demonstrated good  understanding of the education provided. See Electronic Medical Record under Patient Instructions for exercises provided during therapy sessions    Assessment     Rosaura RUSTY Mitchell demonstrates positive carryover from last session. She had better tolerance and ROM with wall walks today and seated active shoulder flexion. She did not require as much cueing as last time regarding compensatory. Improved PROM with less " discomfort. Introduced new exercises that included weights and prone positioning with good tolerance. Completed session with no adverse effects.     Rosaura Is progressing well towards her goals.   Patient prognosis is Good.     Patient will continue to benefit from skilled outpatient physical therapy to address the deficits listed in the problem list box on initial evaluation, provide pt/family education and to maximize pt's level of independence in the home and community environment.     Patient's spiritual, cultural and educational needs considered and pt agreeable to plan of care and goals.     Anticipated Barriers for therapy: compliance     Goals:  Short Term Goals: 6 weeks   Pt will be instructed in an exercise program to address functional deficits related to L UE Sx.  Improve L shoulder ROM to >/= 120 degrees of flexion, 95 degrees of abduction, 35 degrees of external rotation and 25 degrees of internal rotation.  L shoulder girdle strength to >/= 2/5 into flexion, abduction, internal and external rotation  Pt will report a decrease in L shoulder pain to </= 4/10     Long Term Goals: 15 weeks   Pt will be independent in a HEP to assist in managing their L UE Sx.   Improve L shoulder ROM to >/= 150 degrees of flexion, 125 degrees of abduction, 50 degrees of external rotation and 35 degrees of internal rotation.  L shoulder girdle strength to >/= 2/5 into flexion, abduction, internal and external rotation  Pt will report that she is able to dress, bath and groom with greater ease  Pt will report improved functional ability through an improved score on the FOTO shoulder survey    Plan     Continue to progress per PT POC. Initiate side-lying shoulder adduction stretch, prone rows and biceps curls w/ dumbbells next visit.    Edelmira Barba, PT

## 2023-12-04 ENCOUNTER — CLINICAL SUPPORT (OUTPATIENT)
Dept: REHABILITATION | Facility: HOSPITAL | Age: 48
End: 2023-12-04
Payer: MEDICAID

## 2023-12-04 DIAGNOSIS — M25.612 STIFFNESS OF LEFT SHOULDER JOINT: ICD-10-CM

## 2023-12-04 DIAGNOSIS — G89.29 CHRONIC LEFT SHOULDER PAIN: Primary | ICD-10-CM

## 2023-12-04 DIAGNOSIS — M25.512 CHRONIC LEFT SHOULDER PAIN: Primary | ICD-10-CM

## 2023-12-04 PROCEDURE — 97110 THERAPEUTIC EXERCISES: CPT | Mod: PO

## 2023-12-06 ENCOUNTER — CLINICAL SUPPORT (OUTPATIENT)
Dept: REHABILITATION | Facility: HOSPITAL | Age: 48
End: 2023-12-06
Payer: MEDICAID

## 2023-12-06 DIAGNOSIS — M25.612 STIFFNESS OF LEFT SHOULDER JOINT: ICD-10-CM

## 2023-12-06 DIAGNOSIS — G89.29 CHRONIC LEFT SHOULDER PAIN: Primary | ICD-10-CM

## 2023-12-06 DIAGNOSIS — M25.512 CHRONIC LEFT SHOULDER PAIN: Primary | ICD-10-CM

## 2023-12-06 PROCEDURE — 97110 THERAPEUTIC EXERCISES: CPT | Mod: PO

## 2023-12-06 NOTE — PLAN OF CARE
[facility]   Physical Therapy Treatment Note      Name: Rosaura Mitchell  Clinic Number: [id number]    Therapy Diagnosis:   Encounter Diagnoses   Name Primary?    Chronic left shoulder pain Yes    Stiffness of left shoulder joint          Physician: Ariel Valladares MD    Visit Date: 12/6/2023    Physician Orders: PT Eval and Treat   Medical Diagnosis from Referral: Status post total shoulder arthroplasty, left  Evaluation Date: 10/11/2023  Authorization Period Expiration: 10/09/2024  Plan of Care Expiration: 12/11/2023 extended to 2/11/2024  Progress Note Completed : 11/17/2023  Next Progress Note Due: 1/17/24  Date of Surgery: 9/28/2023  Visit # / Visits authorized: 14 / 20   FOTO: 1/ 3     Precautions: Standard, Diabetes, and Hodgkin lymphoma and L total shoulder 9/28/2023       Time In: 0905 AM  Time Out: 1000 AM  Total Billable Time:  60 minutes  (4 TE medicaid)          PTA Visit #: 1/5       Subjective     Patient reports: stiffness in shoulder area. She says that she can lift it better but that it is slow    Per MD f/u 11/10/23:  - Tylenol and Ibuprofen as needed for pain. Perhaps take before PT/OT sessions  - Advance ROM to full elevation over the next 6 weeks. Continue to limit ER to neutral to protect subscapularis repair. May add AAROM.    She was compliant with home exercise program.  Response to previous treatment: tolerated well  Functional change: ongoing    Pain: 4 at rest and 6 with movement/10  Location: left shoulder      Objective      Objective Measures updated at progress report unless specified.    Observation: [facility] indepenenntly with a post-op sling on the L UE     Posture: protracted L shoulder girdle in a post-op sling        Passive Range of Motion:   Shoulder Left Right   Flexion 135 180   Abduction 90 175   ER at 0 0 nt   ER at 90 nt 90   IR 45 70      Active Range of Motion:   Shoulder Left Right   Flexion 70 nt   Abduction nt  "nt   ER at 0 nt nt   ER at 90 nt nt   IR nt nt      Upper Extremity Strength    (L) UE (R) UE   Shoulder flexion: 2/5 nt   Shoulder Abduction: 2/5 nt   Shoulder ER 2/5 nt   Shoulder IR nt nt   Lower Trap nt nt   Middle Trap 2/5 nt   Rhomboids nt nt         Joint Mobility: L GH joint hypomobile     Palpation: tender over anterior L shoulder incision     Sensation: intact     Flexibility: NT     Intake Outcome Measure for FOTO Shoulder Survey     Therapist reviewed FOTO scores for Rosaura Mitchell on 11/17/2023.   FOTO report - see Media section or FOTO account episode details.     Intake Score: 50%              Treatment   Bolded activities performed today:  Rosaura received the treatments listed below:      Per MD f/u 11/10/23:  - Tylenol and Ibuprofen as needed for pain. Perhaps take before PT/OT sessions  - Advance ROM to full elevation over the next 6 weeks. Continue to limit ER to neutral to protect subscapularis repair. May add AAROM.    therapeutic exercises to develop strength, endurance, ROM, flexibility, posture, and core stabilization for 40 minutes including:    Pulleys (flexion and abductions) 3' ea  AAROM shoulder ER in neutral (supine) x30 w/ dowel adrianna to zero degrees  AROM shoulder flexion (supine) x20 w/ passive over-pressure  Wall walks (flexion to tolerance) x20    Supine chest press w/ Red #4 Thera-wand 3 x 10 - with serratus punch  Sleeper Stretch 3 x 20"  Side-lying shoulder adduction stretch 3 x 20"  Shoulder flexion w/ elbow flexion (seated) x20  Prone Row 3x10 , 1 set each with 3#, 4# and 5#  Prone L shoulder extension 10 x 3 with 1#  Prone L shoulder abduction 10 x 2 through available ROM  Prone scapular retraction 10x5"  Biceps Curls 2x10, 1#      NOT PERFORMED TODAY:    Side-lying shoulder flexion 3 x 10 with treking pole     Shoulder ABC's (supine) x1     manual therapy techniques: Joint mobilizations, Soft tissue Mobilization, and ROM were applied to the: L shoulder for 12 minutes, " "including:  Passive ROM into L shoulder flexion in a scapular plain, abduction to 90 deg, IR to tolerance   PNF scapular diagonals D2 flexion and extension     neuromuscular re-education activities to improve: Coordination, Kinesthetic, and Posture for 8 minutes. The following activities were included:  Shoulder isometrics (flexion, extension, abduction/adduction, ER/IR) w/ ball against wall 15x5" holds    Therapeutic activity x 15 minutes including:  cold pack for 0 minutes to left shoulder following treatment.     Patient Education and Home Exercises       Education provided:   - HEP  - Precautions, wearing sling    Written Home Exercises Provided: Patient instructed to cont prior HEP. Exercises were reviewed and Rosaura was able to demonstrate them prior to the end of the session.  Rosaura demonstrated good  understanding of the education provided. See Electronic Medical Record under Patient Instructions for exercises provided during therapy sessions    Assessment     Rosaura Mitchell demonstrates positive carryover from last session. She had better tolerance and ROM with wall walks today and seated active shoulder flexion. She did not require as much cueing as last time regarding compensatory. Improved PROM with less discomfort. Introduced new exercises that included weights and prone positioning with good tolerance. Completed session with no adverse effects.     Rosaura Is progressing well towards her goals.   Patient prognosis is Good.     Patient will continue to benefit from skilled outpatient physical therapy to address the deficits listed in the problem list box on initial evaluation, provide pt/family education and to maximize pt's level of independence in the home and community environment.     Patient's spiritual, cultural and educational needs considered and pt agreeable to plan of care and goals.     Anticipated Barriers for therapy: compliance     Goals:  Short Term Goals: 6 weeks   Pt will be instructed " in an exercise program to address functional deficits related to L UE Sx.  Progressing 12/6/2023  Improve L shoulder ROM to >/= 120 degrees of flexion, 95 degrees of abduction, 35 degrees of external rotation and 25 degrees of internal rotation.  Progressing 12/6/2023  L shoulder girdle strength to >/= 2/5 into flexion, abduction, internal and external rotation  Pt will report a decrease in L shoulder pain to </= 4/10  Progressing 12/6/2023     Long Term Goals: 15 weeks   Pt will be independent in a HEP to assist in managing their L UE Sx. Progressing 12/6/2023  Improve L shoulder ROM to >/= 150 degrees of flexion, 125 degrees of abduction, 50 degrees of external rotation and 35 degrees of internal rotation.  Progressing 12/6/2023  L shoulder girdle strength to >/= 2/5 into flexion, abduction, internal and external rotation  Pt will report that she is able to dress, bath and groom with greater ease  Progressing 12/6/2023  Pt will report improved functional ability through an improved score on the FOTO shoulder survey  Progressing 12/6/2023    Plan   Extend Physical Therapy Plan of Care to 2/11/2024  Continue to progress per PT POC.   Stevan Sweet, PT     I certify the need for these services furnished under this plan of treatment and while under my care.        Ariel Valladares MD, FAAOS  , Orthopaedic Sports Medicine  Residency   [facility] Department of Orthopaedic Surgery  Assistant Orthopaedic Surgeon, [facility]  Head Team Physician, [facility]

## 2023-12-06 NOTE — PROGRESS NOTES
[facility]   Physical Therapy Treatment Note      Name: Rosaura Mitchell  Clinic Number: [id number]    Therapy Diagnosis:   Encounter Diagnoses   Name Primary?    Chronic left shoulder pain Yes    Stiffness of left shoulder joint          Physician: Ariel Valladares MD    Visit Date: 12/6/2023    Physician Orders: PT Eval and Treat   Medical Diagnosis from Referral: Status post total shoulder arthroplasty, left  Evaluation Date: 10/11/2023  Authorization Period Expiration: 10/09/2024  Plan of Care Expiration: 12/11/2023 extended to 2/11/2024  Progress Note Completed : 11/17/2023  Next Progress Note Due: 1/17/24  Date of Surgery: 9/28/2023  Visit # / Visits authorized: 14 / 20   FOTO: 1/ 3     Precautions: Standard, Diabetes, and Hodgkin lymphoma and L total shoulder 9/28/2023       Time In: 0905 AM  Time Out: 1000 AM  Total Billable Time:  60 minutes  (4 TE medicaid)          PTA Visit #: 1/5       Subjective     Patient reports: stiffness in shoulder area. She says that she can lift it better but that it is slow    Per MD f/u 11/10/23:  - Tylenol and Ibuprofen as needed for pain. Perhaps take before PT/OT sessions  - Advance ROM to full elevation over the next 6 weeks. Continue to limit ER to neutral to protect subscapularis repair. May add AAROM.    She was compliant with home exercise program.  Response to previous treatment: tolerated well  Functional change: ongoing    Pain: 4 at rest and 6 with movement/10  Location: left shoulder      Objective      Objective Measures updated at progress report unless specified.    Observation: [facility] indepenenntly with a post-op sling on the L UE     Posture: protracted L shoulder girdle in a post-op sling        Passive Range of Motion:   Shoulder Left Right   Flexion 135 180   Abduction 90 175   ER at 0 0 nt   ER at 90 nt 90   IR 45 70      Active Range of Motion:   Shoulder Left Right   Flexion 70 nt   Abduction nt  "nt   ER at 0 nt nt   ER at 90 nt nt   IR nt nt      Upper Extremity Strength    (L) UE (R) UE   Shoulder flexion: 2/5 nt   Shoulder Abduction: 2/5 nt   Shoulder ER 2/5 nt   Shoulder IR nt nt   Lower Trap nt nt   Middle Trap 2/5 nt   Rhomboids nt nt         Joint Mobility: L GH joint hypomobile     Palpation: tender over anterior L shoulder incision     Sensation: intact     Flexibility: NT     Intake Outcome Measure for FOTO Shoulder Survey     Therapist reviewed FOTO scores for Rosaura Mitchell on 11/17/2023.   FOTO report - see Media section or FOTO account episode details.     Intake Score: 50%              Treatment   Bolded activities performed today:  Rosaura received the treatments listed below:      Per MD f/u 11/10/23:  - Tylenol and Ibuprofen as needed for pain. Perhaps take before PT/OT sessions  - Advance ROM to full elevation over the next 6 weeks. Continue to limit ER to neutral to protect subscapularis repair. May add AAROM.    therapeutic exercises to develop strength, endurance, ROM, flexibility, posture, and core stabilization for 40 minutes including:    Pulleys (flexion and abductions) 3' ea  AAROM shoulder ER in neutral (supine) x30 w/ dowel adrianna to zero degrees  AROM shoulder flexion (supine) x20 w/ passive over-pressure  Wall walks (flexion to tolerance) x20    Supine chest press w/ Red #4 Thera-wand 3 x 10 - with serratus punch  Sleeper Stretch 3 x 20"  Side-lying shoulder adduction stretch 3 x 20"  Shoulder flexion w/ elbow flexion (seated) x20  Prone Row 3x10 , 1 set each with 3#, 4# and 5#  Prone L shoulder extension 10 x 3 with 1#  Prone L shoulder abduction 10 x 2 through available ROM  Prone scapular retraction 10x5"  Biceps Curls 2x10, 1#      NOT PERFORMED TODAY:    Side-lying shoulder flexion 3 x 10 with treking pole     Shoulder ABC's (supine) x1     manual therapy techniques: Joint mobilizations, Soft tissue Mobilization, and ROM were applied to the: L shoulder for 12 minutes, " "including:  Passive ROM into L shoulder flexion in a scapular plain, abduction to 90 deg, IR to tolerance   PNF scapular diagonals D2 flexion and extension     neuromuscular re-education activities to improve: Coordination, Kinesthetic, and Posture for 8 minutes. The following activities were included:  Shoulder isometrics (flexion, extension, abduction/adduction, ER/IR) w/ ball against wall 15x5" holds    Therapeutic activity x 15 minutes including:  cold pack for 0 minutes to left shoulder following treatment.     Patient Education and Home Exercises       Education provided:   - HEP  - Precautions, wearing sling    Written Home Exercises Provided: Patient instructed to cont prior HEP. Exercises were reviewed and Rosaura was able to demonstrate them prior to the end of the session.  Rosaura demonstrated good  understanding of the education provided. See Electronic Medical Record under Patient Instructions for exercises provided during therapy sessions    Assessment     Rosaura Mitchell demonstrates positive carryover from last session. She had better tolerance and ROM with wall walks today and seated active shoulder flexion. She did not require as much cueing as last time regarding compensatory. Improved PROM with less discomfort. Introduced new exercises that included weights and prone positioning with good tolerance. Completed session with no adverse effects.     Rosaura Is progressing well towards her goals.   Patient prognosis is Good.     Patient will continue to benefit from skilled outpatient physical therapy to address the deficits listed in the problem list box on initial evaluation, provide pt/family education and to maximize pt's level of independence in the home and community environment.     Patient's spiritual, cultural and educational needs considered and pt agreeable to plan of care and goals.     Anticipated Barriers for therapy: compliance     Goals:  Short Term Goals: 6 weeks   Pt will be instructed " in an exercise program to address functional deficits related to L UE Sx.  Progressing 12/6/2023  Improve L shoulder ROM to >/= 120 degrees of flexion, 95 degrees of abduction, 35 degrees of external rotation and 25 degrees of internal rotation.  Progressing 12/6/2023  L shoulder girdle strength to >/= 2/5 into flexion, abduction, internal and external rotation  Pt will report a decrease in L shoulder pain to </= 4/10  Progressing 12/6/2023     Long Term Goals: 15 weeks   Pt will be independent in a HEP to assist in managing their L UE Sx. Progressing 12/6/2023  Improve L shoulder ROM to >/= 150 degrees of flexion, 125 degrees of abduction, 50 degrees of external rotation and 35 degrees of internal rotation.  Progressing 12/6/2023  L shoulder girdle strength to >/= 2/5 into flexion, abduction, internal and external rotation  Pt will report that she is able to dress, bath and groom with greater ease  Progressing 12/6/2023  Pt will report improved functional ability through an improved score on the FOTO shoulder survey  Progressing 12/6/2023    Plan   Extend Physical Therapy Plan of Care to 2/11/2024  Continue to progress per PT POC.   Stevan Sweet, PT

## 2023-12-13 ENCOUNTER — CLINICAL SUPPORT (OUTPATIENT)
Dept: REHABILITATION | Facility: HOSPITAL | Age: 48
End: 2023-12-13
Payer: MEDICAID

## 2023-12-13 DIAGNOSIS — M25.512 CHRONIC LEFT SHOULDER PAIN: Primary | ICD-10-CM

## 2023-12-13 DIAGNOSIS — M25.612 STIFFNESS OF LEFT SHOULDER JOINT: ICD-10-CM

## 2023-12-13 DIAGNOSIS — G89.29 CHRONIC LEFT SHOULDER PAIN: Primary | ICD-10-CM

## 2023-12-13 PROCEDURE — 97110 THERAPEUTIC EXERCISES: CPT | Mod: PO

## 2023-12-13 NOTE — PROGRESS NOTES
[facility]   Physical Therapy Treatment Note      Name: Rosaura Mitchell  Clinic Number: [id number]    Therapy Diagnosis:   No diagnosis found.        Physician: Ariel Valladares MD    Visit Date: 12/13/2023    Physician Orders: PT Eval and Treat   Medical Diagnosis from Referral: Status post total shoulder arthroplasty, left  Evaluation Date: 10/11/2023  Authorization Period Expiration: 10/09/2024  Plan of Care Expiration: 12/11/2023 extended to 2/11/2024  Progress Note Completed : 11/17/2023  Next Progress Note Due: 1/17/24  Date of Surgery: 9/28/2023  Visit # / Visits authorized: 15 / 20   FOTO: 1/ 3     Precautions: Standard, Diabetes, and Hodgkin lymphoma and L total shoulder 9/28/2023       Time In: 0905 AM  Time Out: 1000 AM  Total Billable Time:  60 minutes  (4 TE medicaid)          PTA Visit #: 1/5       Subjective     Patient reports: continued stiffness in her L shoulder    Per MD f/u 11/10/23:  - Tylenol and Ibuprofen as needed for pain. Perhaps take before PT/OT sessions  - Advance ROM to full elevation over the next 6 weeks. Continue to limit ER to neutral to protect subscapularis repair. May add AAROM.    She was compliant with home exercise program.  Response to previous treatment: tolerated well  Functional change: ongoing    Pain: 4 at rest and 6 with movement/10  Location: left shoulder      Objective      Objective Measures updated at progress report unless specified.         Intake Outcome Measure for FOTO Shoulder Survey     Therapist reviewed FOTO scores for Rosaura Mitchell on 11/17/2023.   FOTO report - see Media section or FOTO account episode details.     Intake Score: 50%              Treatment   Bolded activities performed today:  Rosaura received the treatments listed below:      Per MD f/u 11/10/23:  - Tylenol and Ibuprofen as needed for pain. Perhaps take before PT/OT sessions  - Advance ROM to full elevation over the next 6 weeks. Continue to limit ER to  "neutral to protect subscapularis repair. May add AAROM.    therapeutic exercises to develop strength, endurance, ROM, flexibility, posture, and core stabilization for 40 minutes including:    Pulleys (flexion and abductions) 3' ea  AAROM shoulder ER in neutral (supine) x30 w/ dowel adrianna to zero degrees  AROM shoulder flexion (supine) x20 w/ passive over-pressure  Wall walks (flexion to tolerance) x20    Supine chest press w/ Red #4 Thera-wand 3 x 10 - with serratus punch  Sleeper Stretch 3 x 20"  Side-lying shoulder adduction stretch 3 x 20"  Shoulder flexion w/ elbow flexion (seated) x20  Prone Row 3x10 with 5#  Prone L shoulder extension 10 x 3 with 1#  Prone L shoulder horizontal abduction 10 x 2 through available ROM  Supine B shoulder horizontal abduction 10 x 2 with red TB  Prone scapular retraction 10x5"  Biceps Curls 2x10, 1#  Lizet manually resisted R shoulder IR/ER 10 x 2    NOT PERFORMED TODAY:    Side-lying shoulder flexion 3 x 10 with treking pole     Shoulder ABC's (supine) x1     manual therapy techniques: Joint mobilizations, Soft tissue Mobilization, and ROM were applied to the: L shoulder for 12 minutes, including:  Passive ROM into L shoulder flexion in a scapular plain, abduction to 90 deg, IR to tolerance   PNF scapular diagonals D2 flexion and extension     neuromuscular re-education activities to improve: Coordination, Kinesthetic, and Posture for 8 minutes. The following activities were included:  Shoulder isometrics (flexion, extension, abduction/adduction, ER/IR) w/ ball against wall 15x5" holds    Therapeutic activity x 15 minutes including:  cold pack for 0 minutes to left shoulder following treatment.     Patient Education and Home Exercises       Education provided:   - HEP  - Precautions, wearing sling    Written Home Exercises Provided: Patient instructed to cont prior HEP. Exercises were reviewed and Rosaura was able to demonstrate them prior to the end of the session.  Rosaura " demonstrated good  understanding of the education provided. See Electronic Medical Record under Patient Instructions for exercises provided during therapy sessions    Assessment     Rosaura Mitchell presents to [facility] with continued c/o L shoulder stiffness. She was able to tolerate manual interventions and exercises well today with some discomfort reported at end range of motion. Completed session with no adverse effects.     Rosaura Is progressing well towards her goals.   Patient prognosis is Good.     Patient will continue to benefit from skilled outpatient physical therapy to address the deficits listed in the problem list box on initial evaluation, provide pt/family education and to maximize pt's level of independence in the home and community environment.     Patient's spiritual, cultural and educational needs considered and pt agreeable to plan of care and goals.     Anticipated Barriers for therapy: compliance     Goals:  Short Term Goals: 6 weeks   Pt will be instructed in an exercise program to address functional deficits related to L UE Sx.  Progressing 12/6/2023  Improve L shoulder ROM to >/= 120 degrees of flexion, 95 degrees of abduction, 35 degrees of external rotation and 25 degrees of internal rotation.  Progressing 12/6/2023  L shoulder girdle strength to >/= 2/5 into flexion, abduction, internal and external rotation  Pt will report a decrease in L shoulder pain to </= 4/10  Progressing 12/6/2023     Long Term Goals: 15 weeks   Pt will be independent in a HEP to assist in managing their L UE Sx. Progressing 12/6/2023  Improve L shoulder ROM to >/= 150 degrees of flexion, 125 degrees of abduction, 50 degrees of external rotation and 35 degrees of internal rotation.  Progressing 12/6/2023  L shoulder girdle strength to >/= 2/5 into flexion, abduction, internal and external rotation  Pt will report that she is able to dress, bath and groom with greater ease  Progressing 12/6/2023  Pt will report improved  functional ability through an improved score on the FOTO shoulder survey  Progressing 12/6/2023    Plan   Extend Physical Therapy Plan of Care to 2/11/2024  Continue to progress per PT POC.   Stevan Sweet, PT

## 2023-12-15 ENCOUNTER — CLINICAL SUPPORT (OUTPATIENT)
Dept: REHABILITATION | Facility: HOSPITAL | Age: 48
End: 2023-12-15
Payer: MEDICAID

## 2023-12-15 DIAGNOSIS — M25.612 STIFFNESS OF LEFT SHOULDER JOINT: ICD-10-CM

## 2023-12-15 DIAGNOSIS — G89.29 CHRONIC LEFT SHOULDER PAIN: Primary | ICD-10-CM

## 2023-12-15 DIAGNOSIS — M25.512 CHRONIC LEFT SHOULDER PAIN: Primary | ICD-10-CM

## 2023-12-15 PROCEDURE — 97110 THERAPEUTIC EXERCISES: CPT | Mod: PO

## 2023-12-15 NOTE — PROGRESS NOTES
[facility]   Physical Therapy Treatment /Progress Note      Name: Rosaura Mitchell  Clinic Number: [id number]    Therapy Diagnosis:   Encounter Diagnoses   Name Primary?    Chronic left shoulder pain Yes    Stiffness of left shoulder joint        Physician: Ariel Valladares MD    Visit Date: 12/15/2023    Physician Orders: PT Eval and Treat   Medical Diagnosis from Referral: Status post total shoulder arthroplasty, left  Evaluation Date: 10/11/2023  Authorization Period Expiration: 10/09/2024  Plan of Care Expiration: 12/11/2023 extended to 2/11/2024  Progress Note Completed : 12/15/2023  Next Progress Note Due: 1/15/24  Date of Surgery: 9/28/2023  Visit # / Visits authorized: 18 / 20   FOTO: 1/ 3     Precautions: Standard, Diabetes, and Hodgkin lymphoma and L total shoulder 9/28/2023       Time In: 1002 AM  Time Out: 1103 AM  Total Billable Time:  61 minutes  (4 TE medicaid)          PTA Visit #: 1/5       Subjective     Patient reports: that her shoulder doesn't feel as tight as it has in the past. Ortho f/u next week.    Per MD f/u 11/10/23:  - Tylenol and Ibuprofen as needed for pain. Perhaps take before PT/OT sessions  - Advance ROM to full elevation over the next 6 weeks. Continue to limit ER to neutral to protect subscapularis repair. May add AAROM.    She was compliant with home exercise program.  Response to previous treatment: tolerated well  Functional change: ongoing    Pain: 4 at rest and 6 with movement/10  Location: left shoulder      Objective      Objective Measures updated at progress report unless specified.       Observation: [facility] indepenenntly without a post-op sling on the L UE     Posture: protracted L shoulder girdle in a post-op sling        Passive Range of Motion:   Shoulder Left Right 12/15/23  L PROM   Flexion 120 nt 158   Abduction nt nt 120   ER at 45 12 nt 40   ER at 90 nt nt NT   IR 50 (to abdomen) nt 60      Active Range of  "Motion:   Shoulder Left Right 12/15/23  L AROM    Flexion nt nt 105   Abduction nt t 93   ER at 0 nt nt NT   ER at 45 nt nt 40   IR nt nt 55      Upper Extremity Strength    (L) UE (R) UE 12/15/23  L MMT   Shoulder flexion: nt nt 2+   Shoulder Abduction: nt nt 2+ w/ arm at side   Shoulder ER nt nt 3+   Shoulder IR nt nt 3+   Lower Trap nt nt NT   Middle Trap nt nt NT   Rhomboids nt nt NT         Joint Mobility: L GH joint hypomobile     Palpation: tender over anterior L shoulder incision     Sensation: intact     Flexibility: NT  Intake Outcome Measure for FOTO Shoulder Survey     Therapist reviewed FOTO scores for Rosaura Mitchell on 11/17/2023.   FOTO report - see Media section or FOTO account episode details.     Intake Score: 50%              Treatment   Bolded activities performed today:  Rosaura received the treatments listed below:      Per MD f/u 11/10/23:  - Tylenol and Ibuprofen as needed for pain. Perhaps take before PT/OT sessions  - Advance ROM to full elevation over the next 6 weeks. Continue to limit ER to neutral to protect subscapularis repair. May add AAROM.    therapeutic exercises to develop strength, endurance, ROM, flexibility, posture, and core stabilization for 40 minutes including:    Pulleys (flexion and abductions) 3' ea  AROM shoulder flexion (supine) x20 w/ passive over-pressure    Supine chest press w/ Red #4 Thera-wand 3 x 10 - with serratus punch  Sleeper Stretch 3 x 30"  Side-lying shoulder adduction 2x10, 1#  Shoulder flexion w/ elbow flexion (seated) x20  Prone Row 3x10 with 5#  Prone L shoulder extension 10 x 3 with 1#  Supine B shoulder horizontal abduction 10 x 2 with red TB  Side-lying shoulder ER towel roll under arm 2x10, 1#    Prone scapular retraction 10x5"  Biceps Curls 3x10, 3#  Lizet manually resisted R shoulder IR/ER 10 x 2      manual therapy techniques: Joint mobilizations, Soft tissue Mobilization, and ROM were applied to the: L shoulder for 10 minutes, " "including:  Passive ROM into L shoulder flexion in a scapular plain, abduction to 90 deg, IR to tolerance   STM in L pectorals, biceps brachii    neuromuscular re-education activities to improve: Coordination, Kinesthetic, and Posture for 0 minutes. The following activities were included:  Shoulder isometrics (flexion, extension, abduction/adduction, ER/IR) w/ ball against wall 15x5" holds    Therapeutic activity x 0 minutes including:    cold pack for 10 minutes to left shoulder following treatment.     Patient Education and Home Exercises       Education provided:   - HEP  - Precautions, wearing sling    Written Home Exercises Provided: Patient instructed to cont prior HEP. Exercises were reviewed and Rosaura was able to demonstrate them prior to the end of the session.  Rosaura demonstrated good  understanding of the education provided. See Electronic Medical Record under Patient Instructions for exercises provided during therapy sessions    Assessment     Rosaura Mitchell presents to Tx with continued c/o L shoulder stiffness. She demonstrates improved ROM passively, actively and was able to establish a baseline for MMT. She did report some pain with the "sleeper stretch" due to positioning but otherwise tolerates treatment and addition of exercises well. She still reports some tenderness around incisional site. Concluded session with cold pack to L shoulder.     Rosaura Is progressing well towards her goals.   Patient prognosis is Good.     Patient will continue to benefit from skilled outpatient physical therapy to address the deficits listed in the problem list box on initial evaluation, provide pt/family education and to maximize pt's level of independence in the home and community environment.     Patient's spiritual, cultural and educational needs considered and pt agreeable to plan of care and goals.     Anticipated Barriers for therapy: compliance     Goals:  Short Term Goals: 6 weeks   Pt will be instructed " in an exercise program to address functional deficits related to L UE Sx.  Progressing 12/15/2023  Improve L shoulder ROM to >/= 120 degrees of flexion, 95 degrees of abduction, 35 degrees of external rotation and 25 degrees of internal rotation.  Progressing 12/15/2023  L shoulder girdle strength to >/= 2/5 into flexion, abduction, internal and external rotation -MET  Pt will report a decrease in L shoulder pain to </= 4/10  Progressing 12/15/2023     Long Term Goals: 15 weeks   Pt will be independent in a HEP to assist in managing their L UE Sx. Progressing 12/15/2023  Improve L shoulder ROM to >/= 150 degrees of flexion, 125 degrees of abduction, 50 degrees of external rotation and 35 degrees of internal rotation.  Progressing 12/15/2023  L shoulder girdle strength to >/= 2/5 into flexion, abduction, internal and external rotation  Pt will report that she is able to dress, bath and groom with greater ease  Progressing 12/15/2023  Pt will report improved functional ability through an improved score on the FOTO shoulder survey  Progressing 12/15/2023    Plan   Extend Physical Therapy Plan of Care to 2/11/2024  Continue to progress per PT POC.   Edelmira Barba, PT      regular rate and rhythm/normal PMI

## 2023-12-15 NOTE — PROGRESS NOTES
[facility]   Physical Therapy Treatment Note      Name: Rosaura Mitchell  Clinic Number: [id number]    Therapy Diagnosis:   Encounter Diagnoses   Name Primary?    Chronic left shoulder pain Yes    Stiffness of left shoulder joint          Physician: Ariel Valladares MD    Visit Date: 12/18/2023    Physician Orders: PT Eval and Treat   Medical Diagnosis from Referral: Status post total shoulder arthroplasty, left  Evaluation Date: 10/11/2023  Authorization Period Expiration: 10/09/2024  Plan of Care Expiration: 12/11/2023 extended to 2/11/2024  Progress Note Completed : 12/15/2023  Next Progress Note Due: 1/15/24  Date of Surgery: 9/28/2023  Visit # / Visits authorized: 18 / 20   FOTO: 1/ 3     Precautions: Standard, Diabetes, and Hodgkin lymphoma and L total shoulder 9/28/2023       Time In: 0703 AM  Time Out: 0756 AM  Total Billable Time:  53 minutes  (4 TE medicaid)          PTA Visit #: 1/5       Subjective     Patient reports: that she was sore after last session but after using ice it was better     Per MD f/u 11/10/23:  - Tylenol and Ibuprofen as needed for pain. Perhaps take before PT/OT sessions  - Advance ROM to full elevation over the next 6 weeks. Continue to limit ER to neutral to protect subscapularis repair. May add AAROM.    She was compliant with home exercise program.  Response to previous treatment: sore  Functional change: ongoing    Pain: 4 /10  Location: left shoulder      Objective      Objective Measures updated at progress report unless specified.        Treatment   Bolded activities performed today:  Rosaura received the treatments listed below:      Per MD f/u 11/10/23:  - Tylenol and Ibuprofen as needed for pain. Perhaps take before PT/OT sessions  - Advance ROM to full elevation over the next 6 weeks. Continue to limit ER to neutral to protect subscapularis repair. May add AAROM.    therapeutic exercises to develop strength, endurance, ROM, flexibility,  "posture, and core stabilization for 45 minutes including:    Pulleys (flexion and abductions) 3' ea  Banister slides (flex and abduction) x30 ea  AROM shoulder flexion (supine) x20 w/ passive over-pressure  Wall walks flexion and scaption 10x5"       Supine chest press w/ Red #4 Thera-wand 3 x 10 - with serratus punch  Sleeper Stretch 3 x 30" (did not complete due to onset of excruciating pain)  Side-lying shoulder adduction 2x10, 1#  Shoulder flexion w/ elbow flexion (seated) x20  Prone Row 3x10 with 5#  Prone L shoulder extension 10 x 3 with 1#  Prone horizontal abduction 3x10 w/ 1#  Supine B shoulder horizontal abduction 10 x 2 with red TB  Side-lying shoulder ER towel roll under arm 2x10, 1#    Prone scapular retraction 10x5"  Biceps Curls 3x10, 3#  Lizet manually resisted R shoulder IR/ER 10 x 2      manual therapy techniques: Joint mobilizations, Soft tissue Mobilization, and ROM were applied to the: L shoulder for 8 minutes, including:  Passive ROM into L shoulder flexion in a scapular plain, abduction to 90 deg, IR to tolerance   STM in L pectorals major and minor, anterior deltoid    neuromuscular re-education activities to improve: Coordination, Kinesthetic, and Posture for 0 minutes. The following activities were included:  Shoulder isometrics (flexion, extension, abduction/adduction, ER/IR) w/ ball against wall 15x5" holds    Therapeutic activity x 0 minutes including:    cold pack for 0 minutes to left shoulder following treatment.     Patient Education and Home Exercises       Education provided:   - HEP  - Precautions, wearing sling    Written Home Exercises Provided: Patient instructed to cont prior HEP. Exercises were reviewed and Rosaura was able to demonstrate them prior to the end of the session.  Rosaura demonstrated good  understanding of the education provided. See Electronic Medical Record under Patient Instructions for exercises provided during therapy sessions    Assessment     Today during " "sleeper stretch patient reported significant pain in the L shoulder on the anterior side within the shoulder joint and described it as achey. Told patient to discontinue; palpation did not reveal any adverse findings and pain gradually subsided once she stopped stretch. Other exercises tolerated well. Encouraged to continue with icing.    Rosaura Mitchell presents to Tx with continued c/o L shoulder stiffness. She demonstrates improved ROM passively, actively and was able to establish a baseline for MMT. She did report some pain with the "sleeper stretch" due to positioning but otherwise tolerates treatment and addition of exercises well. She still reports some tenderness around incisional site. Concluded session with cold pack to L shoulder.     Rosaura Is progressing well towards her goals.   Patient prognosis is Good.     Patient will continue to benefit from skilled outpatient physical therapy to address the deficits listed in the problem list box on initial evaluation, provide pt/family education and to maximize pt's level of independence in the home and community environment.     Patient's spiritual, cultural and educational needs considered and pt agreeable to plan of care and goals.     Anticipated Barriers for therapy: compliance     Goals:  Short Term Goals: 6 weeks   Pt will be instructed in an exercise program to address functional deficits related to L UE Sx.  Progressing 12/15/2023  Improve L shoulder ROM to >/= 120 degrees of flexion, 95 degrees of abduction, 35 degrees of external rotation and 25 degrees of internal rotation.  Progressing 12/15/2023  L shoulder girdle strength to >/= 2/5 into flexion, abduction, internal and external rotation -MET  Pt will report a decrease in L shoulder pain to </= 4/10  Progressing 12/15/2023     Long Term Goals: 15 weeks   Pt will be independent in a HEP to assist in managing their L UE Sx. Progressing 12/15/2023  Improve L shoulder ROM to >/= 150 degrees of " flexion, 125 degrees of abduction, 50 degrees of external rotation and 35 degrees of internal rotation.  Progressing 12/15/2023  L shoulder girdle strength to >/= 2/5 into flexion, abduction, internal and external rotation  Pt will report that she is able to dress, bath and groom with greater ease  Progressing 12/15/2023  Pt will report improved functional ability through an improved score on the FOTO shoulder survey  Progressing 12/15/2023    Plan   Extend Physical Therapy Plan of Care to 2/11/2024  Continue to progress per PT POC.   Edelmira Barba, PT

## 2023-12-18 ENCOUNTER — CLINICAL SUPPORT (OUTPATIENT)
Dept: REHABILITATION | Facility: HOSPITAL | Age: 48
End: 2023-12-18
Payer: MEDICAID

## 2023-12-18 DIAGNOSIS — G89.29 CHRONIC LEFT SHOULDER PAIN: Primary | ICD-10-CM

## 2023-12-18 DIAGNOSIS — M25.612 STIFFNESS OF LEFT SHOULDER JOINT: ICD-10-CM

## 2023-12-18 DIAGNOSIS — M25.512 CHRONIC LEFT SHOULDER PAIN: Primary | ICD-10-CM

## 2023-12-18 PROCEDURE — 97110 THERAPEUTIC EXERCISES: CPT | Mod: PO

## 2023-12-20 ENCOUNTER — CLINICAL SUPPORT (OUTPATIENT)
Dept: REHABILITATION | Facility: HOSPITAL | Age: 48
End: 2023-12-20
Payer: MEDICAID

## 2023-12-20 ENCOUNTER — HOSPITAL ENCOUNTER (OUTPATIENT)
Dept: RADIOLOGY | Facility: HOSPITAL | Age: 48
Discharge: HOME | End: 2023-12-20
Attending: ORTHOPAEDIC SURGERY
Payer: MEDICAID

## 2023-12-20 ENCOUNTER — OFFICE VISIT (OUTPATIENT)
Dept: ORTHOPEDICS | Facility: CLINIC | Age: 48
End: 2023-12-20
Payer: MEDICAID

## 2023-12-20 VITALS
BODY MASS INDEX: 38.6 KG/M2 | DIASTOLIC BLOOD PRESSURE: 67 MMHG | SYSTOLIC BLOOD PRESSURE: 104 MMHG | HEIGHT: 65 IN | WEIGHT: 231.69 LBS | HEART RATE: 92 BPM

## 2023-12-20 DIAGNOSIS — M25.512 CHRONIC LEFT SHOULDER PAIN: Primary | ICD-10-CM

## 2023-12-20 DIAGNOSIS — Z96.612 STATUS POST TOTAL SHOULDER ARTHROPLASTY, LEFT: ICD-10-CM

## 2023-12-20 DIAGNOSIS — M25.612 STIFFNESS OF LEFT SHOULDER JOINT: ICD-10-CM

## 2023-12-20 DIAGNOSIS — Z96.612 STATUS POST TOTAL SHOULDER ARTHROPLASTY, LEFT: Primary | ICD-10-CM

## 2023-12-20 DIAGNOSIS — G89.29 CHRONIC LEFT SHOULDER PAIN: Primary | ICD-10-CM

## 2023-12-20 PROCEDURE — 3044F HG A1C LEVEL LT 7.0%: CPT | Mod: CPTII,,, | Performed by: ORTHOPAEDIC SURGERY

## 2023-12-20 PROCEDURE — 97110 THERAPEUTIC EXERCISES: CPT | Mod: PO

## 2023-12-20 PROCEDURE — 99999 PR PBB SHADOW E&M-EST. PATIENT-LVL IV: ICD-10-PCS | Mod: PBBFAC,,, | Performed by: ORTHOPAEDIC SURGERY

## 2023-12-20 PROCEDURE — 3074F PR MOST RECENT SYSTOLIC BLOOD PRESSURE < 130 MM HG: ICD-10-PCS | Mod: CPTII,,, | Performed by: ORTHOPAEDIC SURGERY

## 2023-12-20 PROCEDURE — 3044F PR MOST RECENT HEMOGLOBIN A1C LEVEL <7.0%: ICD-10-PCS | Mod: CPTII,,, | Performed by: ORTHOPAEDIC SURGERY

## 2023-12-20 PROCEDURE — 4010F ACE/ARB THERAPY RXD/TAKEN: CPT | Mod: CPTII,,, | Performed by: ORTHOPAEDIC SURGERY

## 2023-12-20 PROCEDURE — 73030 X-RAY EXAM OF SHOULDER: CPT | Mod: TC,PN,LT

## 2023-12-20 PROCEDURE — 99024 POSTOP FOLLOW-UP VISIT: CPT | Mod: ,,, | Performed by: ORTHOPAEDIC SURGERY

## 2023-12-20 PROCEDURE — 1159F PR MEDICATION LIST DOCUMENTED IN MEDICAL RECORD: ICD-10-PCS | Mod: CPTII,,, | Performed by: ORTHOPAEDIC SURGERY

## 2023-12-20 PROCEDURE — 1159F MED LIST DOCD IN RCRD: CPT | Mod: CPTII,,, | Performed by: ORTHOPAEDIC SURGERY

## 2023-12-20 PROCEDURE — 99999 PR PBB SHADOW E&M-EST. PATIENT-LVL IV: CPT | Mod: PBBFAC,,, | Performed by: ORTHOPAEDIC SURGERY

## 2023-12-20 PROCEDURE — 1160F RVW MEDS BY RX/DR IN RCRD: CPT | Mod: CPTII,,, | Performed by: ORTHOPAEDIC SURGERY

## 2023-12-20 PROCEDURE — 99214 OFFICE O/P EST MOD 30 MIN: CPT | Mod: PBBFAC,PN | Performed by: ORTHOPAEDIC SURGERY

## 2023-12-20 PROCEDURE — 3078F PR MOST RECENT DIASTOLIC BLOOD PRESSURE < 80 MM HG: ICD-10-PCS | Mod: CPTII,,, | Performed by: ORTHOPAEDIC SURGERY

## 2023-12-20 PROCEDURE — 1160F PR REVIEW ALL MEDS BY PRESCRIBER/CLIN PHARMACIST DOCUMENTED: ICD-10-PCS | Mod: CPTII,,, | Performed by: ORTHOPAEDIC SURGERY

## 2023-12-20 PROCEDURE — 99024 PR POST-OP FOLLOW-UP VISIT: ICD-10-PCS | Mod: ,,, | Performed by: ORTHOPAEDIC SURGERY

## 2023-12-20 PROCEDURE — 3078F DIAST BP <80 MM HG: CPT | Mod: CPTII,,, | Performed by: ORTHOPAEDIC SURGERY

## 2023-12-20 PROCEDURE — 4010F PR ACE/ARB THEARPY RXD/TAKEN: ICD-10-PCS | Mod: CPTII,,, | Performed by: ORTHOPAEDIC SURGERY

## 2023-12-20 PROCEDURE — 3074F SYST BP LT 130 MM HG: CPT | Mod: CPTII,,, | Performed by: ORTHOPAEDIC SURGERY

## 2023-12-20 NOTE — PROGRESS NOTES
"[facility]   Physical Therapy Treatment Note      Name: Rosaura Mitchell  Clinic Number: [id number]    Therapy Diagnosis:   Encounter Diagnoses   Name Primary?    Chronic left shoulder pain Yes    Stiffness of left shoulder joint          Physician: Ariel Valladares MD    Visit Date: 12/20/2023    Physician Orders: PT Eval and Treat   Medical Diagnosis from Referral: Status post total shoulder arthroplasty, left  Evaluation Date: 10/11/2023  Authorization Period Expiration: 10/09/2024  Plan of Care Expiration: 12/11/2023 extended to 2/11/2024  Progress Note Completed : 12/18/2023  Next Progress Note Due: 1/17/24  Date of Surgery: 9/28/2023  Visit # / Visits authorized: 21 / 23  FOTO: 1/ 3     Precautions: Standard, Diabetes, and Hodgkin lymphoma and L total shoulder 9/28/2023       Time In: 0703 AM  Time Out: 0756 AM  Total Billable Time:  53 minutes  (4 TE medicaid)          PTA Visit #: 1/5       Subjective     Patient reports: that her shoulder is a little sore today.  She also reported that the "sleeper stretch" very painful.    Per MD f/u 11/10/23:  - Tylenol and Ibuprofen as needed for pain. Perhaps take before PT/OT sessions  - Advance ROM to full elevation over the next 6 weeks. Continue to limit ER to neutral to protect subscapularis repair. May add AAROM.    She was compliant with home exercise program.  Response to previous treatment: sore  Functional change: ongoing    Pain: 4 /10  Location: left shoulder      Objective      Objective Measures updated at progress report unless specified.        Treatment   Bolded activities performed today:  Rosaura received the treatments listed below:      Per MD f/u 11/10/23:  - Tylenol and Ibuprofen as needed for pain. Perhaps take before PT/OT sessions  - Advance ROM to full elevation over the next 6 weeks. Continue to limit ER to neutral to protect subscapularis repair. May add AAROM.    therapeutic exercises to develop strength, " "endurance, ROM, flexibility, posture, and core stabilization for 53 minutes including:    Pulleys (flexion and abductions) 3' ea  Banister slides (flex and abduction) x30 ea  AROM shoulder flexion (supine) x20 w/ a 1# weight  Wall walks flexion and scaption 10x5"       Supine chest press w/ Red #4 Thera-wand 3 x 10 - with serratus punch  Sleeper Stretch 3 x 30" (did not complete due to onset of excruciating pain)  Side-lying shoulder adduction 2x10, 1#  Shoulder flexion w/ elbow flexion (seated) x20  Prone Row 3x10 with 5#  Prone L shoulder extension 10 x 3 with 1#  Prone horizontal abduction 3x10 w/ 1#  Supine B shoulder horizontal abduction 10 x 2 with red TB  Side-lying shoulder ER towel roll under arm 2x10, 1#    Prone scapular retraction 10x5"  Biceps Curls 3x10, 3#  Lziet manually resisted R shoulder IR/ER 10 x 2      manual therapy techniques: Joint mobilizations, Soft tissue Mobilization, and ROM were applied to the: L shoulder for 8 minutes, including:  Passive ROM into L shoulder flexion in a scapular plain, abduction to 90 deg, IR to tolerance   STM in L pectorals major and minor, anterior deltoid    neuromuscular re-education activities to improve: Coordination, Kinesthetic, and Posture for 0 minutes. The following activities were included:  Shoulder isometrics (flexion, extension, abduction/adduction, ER/IR) w/ ball against wall 15x5" holds    Therapeutic activity x 0 minutes including:    cold pack for 0 minutes to left shoulder following treatment.     Patient Education and Home Exercises       Education provided:   - HEP  - Precautions, wearing sling    Written Home Exercises Provided: Patient instructed to cont prior HEP. Exercises were reviewed and Rosaura was able to demonstrate them prior to the end of the session.  Rosaura demonstrated good  understanding of the education provided. See Electronic Medical Record under Patient Instructions for exercises provided during therapy sessions    Assessment " "    Pt presented to Tx today with reported pain in her L shoulder.  She reported that she is no longer doing the "sleeper stretch" secondary to increased pain with the activity during her last Tx. Passive L shoulder internal rotation at 90 degrees of abduction appears to be adequate at this time.  Continue to progress strength and ROM of the L shoulder to improve L UE function.      Rosaura Mitchell presents to Tx with continued c/o L shoulder stiffness. She demonstrates improved ROM passively, actively and was able to establish a baseline for MMT. She did report some pain with the "sleeper stretch" due to positioning but otherwise tolerates treatment and addition of exercises well. She still reports some tenderness around incisional site. Concluded session with cold pack to L shoulder.     Rosaura Is progressing well towards her goals.   Patient prognosis is Good.     Patient will continue to benefit from skilled outpatient physical therapy to address the deficits listed in the problem list box on initial evaluation, provide pt/family education and to maximize pt's level of independence in the home and community environment.     Patient's spiritual, cultural and educational needs considered and pt agreeable to plan of care and goals.     Anticipated Barriers for therapy: compliance     Goals:  Short Term Goals: 6 weeks   Pt will be instructed in an exercise program to address functional deficits related to L UE Sx.  Progressing 12/15/2023  Improve L shoulder ROM to >/= 120 degrees of flexion, 95 degrees of abduction, 35 degrees of external rotation and 25 degrees of internal rotation.  Progressing 12/15/2023  L shoulder girdle strength to >/= 2/5 into flexion, abduction, internal and external rotation -MET  Pt will report a decrease in L shoulder pain to </= 4/10  Progressing 12/15/2023     Long Term Goals: 15 weeks   Pt will be independent in a HEP to assist in managing their L UE Sx. Progressing 12/15/2023  Improve " L shoulder ROM to >/= 150 degrees of flexion, 125 degrees of abduction, 50 degrees of external rotation and 35 degrees of internal rotation.  Progressing 12/15/2023  L shoulder girdle strength to >/= 2/5 into flexion, abduction, internal and external rotation  Pt will report that she is able to dress, bath and groom with greater ease  Progressing 12/15/2023  Pt will report improved functional ability through an improved score on the FOTO shoulder survey  Progressing 12/15/2023    Plan   Extend Physical Therapy Plan of Care to 2/11/2024  Continue to progress per PT POC.   Stevan Sweet, PT

## 2023-12-20 NOTE — PROGRESS NOTES
Patient ID:   Rosaura Mitchell is a 48 y.o. female.    Chief Complaint:   11w 6d status post left anatomic total shoulder arthroplasty    HPI:   The patient is just under 3 months out from her left total shoulder arthroplasty.  She reports some pain although it is improving.  Pain level today is 7/10.    Medications:    Current Outpatient Medications:     ACCU-CHEK FASTCLIX Misc, , Disp: , Rfl:     albuterol (PROVENTIL/VENTOLIN HFA) 90 mcg/actuation inhaler, INHALE 2 PUFFS FOUR TIMES DAILY AS NEEDED FOR SHORTNESS OF BREATH, Disp: , Rfl:     amitriptyline (ELAVIL) 50 MG tablet, Take 1 tablet by mouth., Disp: , Rfl:     atorvastatin (LIPITOR) 10 MG tablet, Take 10 mg by mouth nightly., Disp: , Rfl:     BINAXNOW COVID-19 AG SELF TEST Kit, TEST AS DIRECTED TODAY, Disp: , Rfl:     cholecalciferol, vitamin D3, 125 mcg (5,000 unit) Tab, Take 5,000 Units by mouth once daily., Disp: , Rfl:     citalopram (CELEXA) 40 MG tablet, Take 40 mg by mouth., Disp: , Rfl:     clotrimazole-betamethasone 1-0.05% (LOTRISONE) cream, APPLY CREAM TOPICALLY TO AFFECTED AREA (CHEST AND ABDOMEN) 1 TO 2 TIMES DAILY FOR 10 DAYS, Disp: , Rfl:     doxepin (SINEQUAN) 10 MG capsule, TAKE 1 CAPSULE BY MOUTH AT BEDTIME AS NEEDED FOR SLEEP OR ITCHINESS, Disp: , Rfl:     enoxaparin (LOVENOX) 120 mg/0.8 mL Syrg, , Disp: , Rfl:     famotidine (PEPCID) 20 MG tablet, Take by mouth., Disp: , Rfl:     hydrOXYzine (ATARAX) 50 MG tablet, , Disp: , Rfl:     LINZESS 290 mcg Cap capsule, Take 290 mcg by mouth every morning., Disp: , Rfl:     losartan (COZAAR) 50 MG tablet, Take 50 mg by mouth., Disp: , Rfl:     NURTEC 75 mg odt, Take by mouth., Disp: , Rfl:     nystatin (MYCOSTATIN) powder, APPLY POWDER TOPICALLY TO AFFECTED AREA(TO INFRAMAMMARY BREASTS AND ABDOMINAL FOLD(S)) TWICE DAILY FOR 14 DAYS, Disp: , Rfl:     ondansetron (ZOFRAN) 4 MG tablet, Take 1 tablet (4 mg total) by mouth every 6 (six) hours as needed for Nausea., Disp: 28 tablet, Rfl: 0    ONETOUCH  "ULTRA BLUE TEST STRIP Strp, , Disp: , Rfl:     ONETOUCH ULTRA2 kit, , Disp: , Rfl:     oxyCODONE-acetaminophen (PERCOCET) 5-325 mg per tablet, Take 1 tablet by mouth every 6 (six) hours as needed for Pain., Disp: 20 tablet, Rfl: 0    pantoprazole (PROTONIX) 40 MG tablet, Take 40 mg by mouth., Disp: , Rfl:     topiramate (TOPAMAX) 100 MG tablet, Take 100 mg by mouth 2 (two) times daily., Disp: , Rfl:     XARELTO 20 mg Tab, , Disp: , Rfl:     ZTLIDO 1.8 % PtMd, Apply 1 patch topically daily as needed., Disp: , Rfl:   No current facility-administered medications for this visit.    Facility-Administered Medications Ordered in Other Visits:     cefazolin (ANCEF) 2 gram in dextrose 5% 50 mL IVPB (premix), 2 g, Intravenous, 30 Min Pre-Op, Ariel Valladares MD, 2 g at 01/16/20 1613    Allergies:  Review of patient's allergies indicates:   Allergen Reactions    Naproxen      Hair falls out     Vitals:  /67   Pulse 92   Ht 5' 5" (1.651 m)   Wt 105.1 kg (231 lb 11.3 oz)   BMI 38.56 kg/m²     Physical Examination:  Ortho Exam   Left shoulder exam:  Range of motion today reveals active elevation to about 130, external rotation 10.  Rotator cuff strength reveals 4/5 elevation and external rotation 4+ out of 5 internal rotation.  Negative belly press.    Imaging studies:  I have ordered and independently reviewed the following imaging studies performed at [facility] today    Left shoulder x-ray series demonstrates well-aligned well-seated total shoulder arthroplasty.    Assessment:  1. Status post total shoulder arthroplasty, left      Plan:  Patient will continue with range of motion and strengthening exercises.  She will follow up in 3 months with a new x-ray of the left shoulder.       No follow-ups on file.          "

## 2023-12-20 NOTE — LETTER
December 20, 2023      [facility]  [address]  [address]  [place] [address]  Phone: [phone]  Fax: [phone]       Patient: Rosaura Mitchell   YOB: 1975  Date of Visit: 12/20/2023      To Whom It May Concern:        Carter Mitchell  was at [facility] on 12/20/2023. The patient may return to work/school on Tuesday, January 2, 2024 with no restrictions. If you have any questions or concerns, or if I can be of further assistance, please do not hesitate to contact me.        Sincerely,        Brea Moseley MA

## 2023-12-22 NOTE — PROGRESS NOTES
"[facility]   Physical Therapy Treatment Note      Name: Rosaura Mitchell  Clinic Number: [id number]    Therapy Diagnosis:   Encounter Diagnoses   Name Primary?    Chronic left shoulder pain Yes    Stiffness of left shoulder joint          Physician: Ariel Valladares MD    Visit Date: 12/26/2023    Physician Orders: PT Eval and Treat   Medical Diagnosis from Referral: Status post total shoulder arthroplasty, left  Evaluation Date: 10/11/2023  Authorization Period Expiration: 10/09/2024  Plan of Care Expiration: 12/11/2023 extended to 2/11/2024  Progress Note Completed : 12/15/2023  Next Progress Note Due: 1/15/24  Date of Surgery: 9/28/2023  Visit # / Visits authorized: 22 / 20   FOTO: 1/ 3     Precautions: Standard, Diabetes, and Hodgkin lymphoma and L total shoulder 9/28/2023       Time In: 1003 AM  Time Out: 1058 AM  Total Billable Time:  55 minutes  (4 TE medicaid)          PTA Visit #: 1/5       Subjective     Patient reports: that she had some pain while at [employer] last night but she says that she was doing some "lifts" . Patient reports that she will be returning to work on Jan 2 and has received verbal release from MD to do so.      Per MD f/u 11/10/23:  - Tylenol and Ibuprofen as needed for pain. Perhaps take before PT/OT sessions  - Advance ROM to full elevation over the next 6 weeks. Continue to limit ER to neutral to protect subscapularis repair. May add AAROM.    She was compliant with home exercise program.  Response to previous treatment: sore  Functional change: ongoing    Pain: 4 /10  Location: left shoulder      Objective      Objective Measures updated at progress report unless specified.     Treatment   Bolded activities performed today:  Rosaura received the treatments listed below:      Per MD f/u 11/10/23:  - Tylenol and Ibuprofen as needed for pain. Perhaps take before PT/OT sessions  - Advance ROM to full elevation over the next 6 weeks. Continue to limit ER to " "neutral to protect subscapularis repair. May add AAROM.    therapeutic exercises to develop strength, endurance, ROM, flexibility, posture, and core stabilization for 35 minutes including:    Banister slides (flex and abduction) x30 ea  AROM shoulder flexion (supine) x20 w/ passive over-pressure  Shoulder IR stretch w/ strap 6x10"  Supine chest press w/ Red #4 Thera-wand 3 x 10 - with serratus punch    Side-lying shoulder ER towel roll under arm 3x10, 2#  Shoulder flexion (seated) x20 , #3 therabar  Shoulder abduction (seated) x20 , 3# therabar  Rows 3x10 RTB  Supine Punch 3x10, 3#    Not Performed:   Pulleys (flexion and abductions) 3' ea  Biceps Curls 3x10, 3#  Gentle manually resisted R shoulder IR/ER 10 x 2  Supine B shoulder horizontal abduction 10 x 2 with red TB  Prone L shoulder extension 10 x 3 with 1#  Side-lying shoulder adduction 2x10, 1#  Prone Row 3x10 with 5#  Prone horizontal abduction 3x10 w/ 1#    manual therapy techniques: Joint mobilizations, Soft tissue Mobilization, and ROM were applied to the: L shoulder for 8 minutes, including:  Passive ROM into L shoulder flexion in a scapular plain, abduction to 90 deg, IR to tolerance       neuromuscular re-education activities to improve: Coordination, Kinesthetic, and Posture for 12 minutes. The following activities were included:  Resisted Elbow extension x30 RTB  Resisted shoulder IR (towel roll under arm) x20 YTB  Rhythmic stabilization (all directions) therapist induced perturbations    Therapeutic activity x 0 minutes including:    cold pack for 0 minutes to left shoulder following treatment.     Patient Education and Home Exercises       Education provided:   - HEP  - Precautions, wearing sling    Written Home Exercises Provided: Patient instructed to cont prior HEP. Exercises were reviewed and Rosauar was able to demonstrate them prior to the end of the session.  Rosaura demonstrated good  understanding of the education provided. See Electronic " Medical Record under Patient Instructions for exercises provided during therapy sessions    Assessment     Ms. Kaplan arrived to clinic with mild pain today. Initiated more strength based exercises as well as neuro based including rhythmic stabilization/pertubation's. Pt demonstrates persistent AROM and strength improvements. Pt was able to perform shoulder flexion with therabar in sitting today with some compensatory techniques of the upper trap, but was able to make some adjustments with cueing. She still reported some discomfort in the shoulder with IR stretch but was able to complete. Concluded session with no adverse effects.    Rosaura Is progressing well towards her goals.   Patient prognosis is Good.     Patient will continue to benefit from skilled outpatient physical therapy to address the deficits listed in the problem list box on initial evaluation, provide pt/family education and to maximize pt's level of independence in the home and community environment.     Patient's spiritual, cultural and educational needs considered and pt agreeable to plan of care and goals.     Anticipated Barriers for therapy: compliance     Goals:  Short Term Goals: 6 weeks   Pt will be instructed in an exercise program to address functional deficits related to L UE Sx.  Progressing 12/15/2023  Improve L shoulder ROM to >/= 120 degrees of flexion, 95 degrees of abduction, 35 degrees of external rotation and 25 degrees of internal rotation.  Progressing 12/15/2023  L shoulder girdle strength to >/= 2/5 into flexion, abduction, internal and external rotation -MET  Pt will report a decrease in L shoulder pain to </= 4/10  Progressing 12/15/2023     Long Term Goals: 15 weeks   Pt will be independent in a HEP to assist in managing their L UE Sx. Progressing 12/15/2023  Improve L shoulder ROM to >/= 150 degrees of flexion, 125 degrees of abduction, 50 degrees of external rotation and 35 degrees of internal rotation.  Progressing  12/15/2023  L shoulder girdle strength to >/= 2/5 into flexion, abduction, internal and external rotation  Pt will report that she is able to dress, bath and groom with greater ease  Progressing 12/15/2023  Pt will report improved functional ability through an improved score on the FOTO shoulder survey  Progressing 12/15/2023    Plan   Extend Physical Therapy Plan of Care to 2/11/2024  Continue to progress per PT POC. Initiate UBE with no resistance next visit.  Edelmira Barba, PT

## 2023-12-25 PROBLEM — I26.99 PULMONARY EMBOLISM ON LONG-TERM ANTICOAGULATION THERAPY: Status: RESOLVED | Noted: 2018-08-17 | Resolved: 2023-12-25

## 2023-12-25 PROBLEM — Z79.01 PULMONARY EMBOLISM ON LONG-TERM ANTICOAGULATION THERAPY: Status: RESOLVED | Noted: 2018-08-17 | Resolved: 2023-12-25

## 2023-12-26 ENCOUNTER — CLINICAL SUPPORT (OUTPATIENT)
Dept: REHABILITATION | Facility: HOSPITAL | Age: 48
End: 2023-12-26
Payer: MEDICAID

## 2023-12-26 DIAGNOSIS — M25.612 STIFFNESS OF LEFT SHOULDER JOINT: ICD-10-CM

## 2023-12-26 DIAGNOSIS — G89.29 CHRONIC LEFT SHOULDER PAIN: Primary | ICD-10-CM

## 2023-12-26 DIAGNOSIS — M25.512 CHRONIC LEFT SHOULDER PAIN: Primary | ICD-10-CM

## 2023-12-26 PROCEDURE — 97110 THERAPEUTIC EXERCISES: CPT | Mod: PO

## 2023-12-26 NOTE — PROGRESS NOTES
"[facility]   Physical Therapy Treatment Note      Name: Rosaura Mitchell  Clinic Number: [id number]    Therapy Diagnosis:   Encounter Diagnoses   Name Primary?    Chronic left shoulder pain Yes    Stiffness of left shoulder joint            Physician: Ariel Valladares MD    Visit Date: 12/28/2023    Physician Orders: PT Eval and Treat   Medical Diagnosis from Referral: Status post total shoulder arthroplasty, left  Evaluation Date: 10/11/2023  Authorization Period Expiration: 10/09/2024  Plan of Care Expiration: 12/11/2023 extended to 2/11/2024  Progress Note Completed : 12/15/2023  Next Progress Note Due: 1/15/24  Date of Surgery: 9/28/2023  Visit # / Visits authorized: 23 / 20   FOTO: 1/ 3     Precautions: Standard, Diabetes, and Hodgkin lymphoma and L total shoulder 9/28/2023       Time In: 0908 AM  Time Out: 1001 AM  Total Billable Time:  53 minutes  (4 TE medicaid)          PTA Visit #: 1/5       Subjective     Patient reports: "I feel more stiff than anything"     Per MD f/u 11/10/23:  - Tylenol and Ibuprofen as needed for pain. Perhaps take before PT/OT sessions  - Advance ROM to full elevation over the next 6 weeks. Continue to limit ER to neutral to protect subscapularis repair. May add AAROM.    She was compliant with home exercise program.  Response to previous treatment: sore  Functional change: ongoing    Pain: 5 /10  Location: left shoulder      Objective      Objective Measures updated at progress report unless specified.     Treatment   Bolded activities performed today:  Rosaura received the treatments listed below:      Per MD f/u 11/10/23:  - Tylenol and Ibuprofen as needed for pain. Perhaps take before PT/OT sessions  - Advance ROM to full elevation over the next 6 weeks. Continue to limit ER to neutral to protect subscapularis repair. May add AAROM.    therapeutic exercises to develop strength, endurance, ROM, flexibility, posture, and core stabilization for 32 " "minutes including:  UBE 2' fwd/2' bcwd  Banister slides (flex and abduction) x30 ea  Shoulder IR stretch w/ strap 6x10"    Side-lying shoulder ER towel roll under arm 3x10, 2#  Shoulder flexion (seated) x20 , #3 therabar  Shoulder abduction (seated) x20 , 3# therabar  Rows 3x10 RTB  Supine Punch 3x10, 3#  Tricep extension 2x10 YTB    Not Performed:   Pulleys (flexion and abductions) 3' ea  Biceps Curls 3x10, 3#  Gentle manually resisted R shoulder IR/ER 10 x 2  Supine B shoulder horizontal abduction 10 x 2 with red TB  Prone L shoulder extension 10 x 3 with 1#  Side-lying shoulder adduction 2x10, 1#  Prone Row 3x10 with 5#  Prone horizontal abduction 3x10 w/ 1#  AROM shoulder flexion (supine) x20 w/ passive over-pressure  Supine chest press w/ Red #4 Thera-wand 3 x 10 - with serratus punch    manual therapy techniques: Joint mobilizations, Soft tissue Mobilization, and ROM were applied to the: L shoulder for 6 minutes, including:  Passive ROM into L shoulder flexion in a scapular plain, abduction to 90 deg, IR to tolerance       neuromuscular re-education activities to improve: Coordination, Kinesthetic, and Posture for 15 minutes. The following activities were included:  Resisted Elbow extension x30 RTB  Resisted shoulder IR (towel roll under arm) x20 YTB  Resisted shoulder ER (towel roll under arm) x20 YTB  Rhythmic stabilization (all directions) therapist induced perturbations    Therapeutic activity x 0 minutes including:    cold pack for 0 minutes to left shoulder following treatment.     Patient Education and Home Exercises       Education provided:   - HEP  - Precautions, wearing sling    Written Home Exercises Provided: Patient instructed to cont prior HEP. Exercises were reviewed and Rosaura was able to demonstrate them prior to the end of the session.  Rosaura demonstrated good  understanding of the education provided. See Electronic Medical Record under Patient Instructions for exercises provided during " therapy sessions    Assessment     Ms. Kaplan tolerated interventions well today. Pt started UBE today with no increasing pain. Also added resisted shoulder ER. Pt demonstrates greater muscular control and quality performance of shoulder flexion in seated position. Concluded session with no adverse effects.       Rosaura Is progressing well towards her goals.   Patient prognosis is Good.     Patient will continue to benefit from skilled outpatient physical therapy to address the deficits listed in the problem list box on initial evaluation, provide pt/family education and to maximize pt's level of independence in the home and community environment.     Patient's spiritual, cultural and educational needs considered and pt agreeable to plan of care and goals.     Anticipated Barriers for therapy: compliance     Goals:  Short Term Goals: 6 weeks   Pt will be instructed in an exercise program to address functional deficits related to L UE Sx.  Progressing 12/15/2023  Improve L shoulder ROM to >/= 120 degrees of flexion, 95 degrees of abduction, 35 degrees of external rotation and 25 degrees of internal rotation.  Progressing 12/15/2023  L shoulder girdle strength to >/= 2/5 into flexion, abduction, internal and external rotation -MET  Pt will report a decrease in L shoulder pain to </= 4/10  Progressing 12/15/2023     Long Term Goals: 15 weeks   Pt will be independent in a HEP to assist in managing their L UE Sx. Progressing 12/15/2023  Improve L shoulder ROM to >/= 150 degrees of flexion, 125 degrees of abduction, 50 degrees of external rotation and 35 degrees of internal rotation.  Progressing 12/15/2023  L shoulder girdle strength to >/= 2/5 into flexion, abduction, internal and external rotation  Pt will report that she is able to dress, bath and groom with greater ease  Progressing 12/15/2023  Pt will report improved functional ability through an improved score on the FOTO shoulder survey  Progressing  12/15/2023    Plan   Extend Physical Therapy Plan of Care to 2/11/2024  Continue to progress per PT POC. Initiate UBE with no resistance next visit.  Edelmira Barba, PT

## 2023-12-28 ENCOUNTER — CLINICAL SUPPORT (OUTPATIENT)
Dept: REHABILITATION | Facility: HOSPITAL | Age: 48
End: 2023-12-28
Payer: MEDICAID

## 2023-12-28 DIAGNOSIS — M25.512 CHRONIC LEFT SHOULDER PAIN: Primary | ICD-10-CM

## 2023-12-28 DIAGNOSIS — M25.612 STIFFNESS OF LEFT SHOULDER JOINT: ICD-10-CM

## 2023-12-28 DIAGNOSIS — G89.29 CHRONIC LEFT SHOULDER PAIN: Primary | ICD-10-CM

## 2023-12-28 PROCEDURE — 97110 THERAPEUTIC EXERCISES: CPT | Mod: PO

## 2023-12-28 NOTE — PROGRESS NOTES
[facility]   Physical Therapy Treatment Note      Name: Rosaura Mitchell  Clinic Number: [id number]    Therapy Diagnosis:   Encounter Diagnoses   Name Primary?    Chronic left shoulder pain Yes    Stiffness of left shoulder joint        Physician: Ariel Valladares MD    Visit Date: 1/2/2024    Physician Orders: PT Eval and Treat   Medical Diagnosis from Referral: Status post total shoulder arthroplasty, left  Evaluation Date: 10/11/2023  Authorization Period Expiration: 10/09/2024  Plan of Care Expiration: 12/11/2023 extended to 2/11/2024  Progress Note Completed : 12/15/2023  Next Progress Note Due: 1/15/24  Date of Surgery: 9/28/2023  Visit # / Visits authorized: 24 / 20   FOTO: 1/ 3     Precautions: Standard, Diabetes, and Hodgkin lymphoma and L total shoulder 9/28/2023       Time In: 1001 AM  Time Out: 1050 AM  Total Billable Time:  49 minutes  (3 TE medicaid)          PTA Visit #: 1/5       Subjective     Patient reports: that she went back to work today     Per MD f/u 11/10/23:  - Tylenol and Ibuprofen as needed for pain. Perhaps take before PT/OT sessions  - Advance ROM to full elevation over the next 6 weeks. Continue to limit ER to neutral to protect subscapularis repair. May add AAROM.    She was compliant with home exercise program.  Response to previous treatment: sore  Functional change: ongoing    Pain: 5 /10  Location: left shoulder      Objective      Objective Measures updated at progress report unless specified.     Treatment   Bolded activities performed today:  Rosaura received the treatments listed below:      Per MD f/u 11/10/23:  - Tylenol and Ibuprofen as needed for pain. Perhaps take before PT/OT sessions  - Advance ROM to full elevation over the next 6 weeks. Continue to limit ER to neutral to protect subscapularis repair. May add AAROM.    therapeutic exercises to develop strength, endurance, ROM, flexibility, posture, and core stabilization for 32 minutes  "including:  UBE 3' fwd/3' bcwd  Wall slides (flexion/abduction) 12x5" ea  Shoulder IR stretch w/ strap 4x20"  Wall alphabet (w/ ball) x2    Shoulder flexion (seated) x20 , #3 therabar  Shoulder ER 3x10 L YTB  Shoulder IR 3x10 L RTB  Rows 3x10 GTB  Resisted shoulder flexion/scaption/abduction (standing) x10 ea YTB  Tricep extension 3x10 RTB    Not Performed:   Pulleys (flexion and abductions) 3' ea  Banister slides (flex and abduction) x30 ea  Shoulder abduction (seated) x20 , 3# therabar  Prone L shoulder extension 10 x 3 with 1#  Prone Row 3x10 with 5#  Prone horizontal abduction 3x10 w/ 1#  AROM shoulder flexion (supine) x20 w/ passive over-pressure  Supine chest press w/ Red #4 Thera-wand 3 x 10 - with serratus punch  Supine Punch 3x10, 3#  Side-lying shoulder ER towel roll under arm 3x10, 2#    manual therapy techniques: Joint mobilizations, Soft tissue Mobilization, and ROM were applied to the: L shoulder for 6 minutes, including:  Passive ROM into L shoulder flexion in a scapular plain, abduction to 90 deg, IR to tolerance       neuromuscular re-education activities to improve: Coordination, Kinesthetic, and Posture for 15 minutes. The following activities were included:  Resisted Elbow extension x30 RTB  Resisted shoulder IR (towel roll under arm) x20 YTB  Resisted shoulder ER (towel roll under arm) x20 YTB  Rhythmic stabilization (all directions) therapist induced perturbations    Therapeutic activity x 0 minutes including:    cold pack for 0 minutes to left shoulder following treatment.     Patient Education and Home Exercises       Education provided:   - HEP  - Precautions, wearing sling    Written Home Exercises Provided: Patient instructed to cont prior HEP. Exercises were reviewed and Rosaura was able to demonstrate them prior to the end of the session.  Rosaura demonstrated good  understanding of the education provided. See Electronic Medical Record under Patient Instructions for exercises provided during " therapy sessions    Assessment     Ms. Kaplan tolerated continued and additional interventions well today. Increased reps and resistance for several exercises. She is better able to perform active shoulder flexion and abduction with less cueing and less shoulder hiking compensation. Concluded session with no reports of pain.     Rosaura Is progressing well towards her goals.   Patient prognosis is Good.     Patient will continue to benefit from skilled outpatient physical therapy to address the deficits listed in the problem list box on initial evaluation, provide pt/family education and to maximize pt's level of independence in the home and community environment.     Patient's spiritual, cultural and educational needs considered and pt agreeable to plan of care and goals.     Anticipated Barriers for therapy: compliance     Goals:  Short Term Goals: 6 weeks   Pt will be instructed in an exercise program to address functional deficits related to L UE Sx.  Progressing 12/15/2023  Improve L shoulder ROM to >/= 120 degrees of flexion, 95 degrees of abduction, 35 degrees of external rotation and 25 degrees of internal rotation.  Progressing 12/15/2023  L shoulder girdle strength to >/= 2/5 into flexion, abduction, internal and external rotation -MET  Pt will report a decrease in L shoulder pain to </= 4/10  Progressing 12/15/2023     Long Term Goals: 15 weeks   Pt will be independent in a HEP to assist in managing their L UE Sx. Progressing 12/15/2023  Improve L shoulder ROM to >/= 150 degrees of flexion, 125 degrees of abduction, 50 degrees of external rotation and 35 degrees of internal rotation.  Progressing 12/15/2023  L shoulder girdle strength to >/= 2/5 into flexion, abduction, internal and external rotation  Pt will report that she is able to dress, bath and groom with greater ease  Progressing 12/15/2023  Pt will report improved functional ability through an improved score on the FOTO shoulder survey  Progressing  12/15/2023    Plan   Extend Physical Therapy Plan of Care to 2/11/2024  Continue to progress per PT POC. Initiate UBE with no resistance next visit.  Edelmira Barba, PT

## 2024-01-02 ENCOUNTER — CLINICAL SUPPORT (OUTPATIENT)
Dept: REHABILITATION | Facility: HOSPITAL | Age: 49
End: 2024-01-02
Payer: MEDICAID

## 2024-01-02 DIAGNOSIS — M25.512 CHRONIC LEFT SHOULDER PAIN: Primary | ICD-10-CM

## 2024-01-02 DIAGNOSIS — M25.612 STIFFNESS OF LEFT SHOULDER JOINT: ICD-10-CM

## 2024-01-02 DIAGNOSIS — G89.29 CHRONIC LEFT SHOULDER PAIN: Primary | ICD-10-CM

## 2024-01-02 PROCEDURE — 97110 THERAPEUTIC EXERCISES: CPT | Mod: PO

## 2024-01-04 ENCOUNTER — CLINICAL SUPPORT (OUTPATIENT)
Dept: REHABILITATION | Facility: HOSPITAL | Age: 49
End: 2024-01-04
Payer: MEDICAID

## 2024-01-04 DIAGNOSIS — G89.29 CHRONIC LEFT SHOULDER PAIN: Primary | ICD-10-CM

## 2024-01-04 DIAGNOSIS — M25.612 STIFFNESS OF LEFT SHOULDER JOINT: ICD-10-CM

## 2024-01-04 DIAGNOSIS — M25.512 CHRONIC LEFT SHOULDER PAIN: Primary | ICD-10-CM

## 2024-01-04 PROCEDURE — 97110 THERAPEUTIC EXERCISES: CPT | Mod: PO

## 2024-01-04 NOTE — PROGRESS NOTES
[facility]   Physical Therapy Treatment Note      Name: Rosaura Mitchell  Clinic Number: [id number]    Therapy Diagnosis:   Encounter Diagnoses   Name Primary?    Chronic left shoulder pain Yes    Stiffness of left shoulder joint        Physician: Ariel Valladares MD    Visit Date: 1/4/2024    Physician Orders: PT Eval and Treat   Medical Diagnosis from Referral: Status post total shoulder arthroplasty, left  Evaluation Date: 10/11/2023  Authorization Period Expiration: 10/09/2024  Plan of Care Expiration: 12/11/2023 extended to 2/11/2024  Progress Note Completed : 12/15/2023  Next Progress Note Due: 1/15/24  Date of Surgery: 9/28/2023  Visit # / Visits authorized: 2/ 20   FOTO: 2/ 3     Precautions: Standard, Diabetes, and Hodgkin lymphoma and L total shoulder 9/28/2023       Time In: 1000 AM  Time Out: 1053 AM  Total Billable Time:  53 minutes  (4 TE medicaid) + 10 min for ice to the L shoulder          PTA Visit #: 1/5       Subjective     Patient reports: that she had some pain in her L shoulder changing a diaper yesterday.     Per MD f/u 11/10/23:  - Tylenol and Ibuprofen as needed for pain. Perhaps take before PT/OT sessions  - Advance ROM to full elevation over the next 6 weeks. Continue to limit ER to neutral to protect subscapularis repair. May add AAROM.    She was compliant with home exercise program.  Response to previous treatment: sore  Functional change: ongoing    Pain: 5 /10  Location: left shoulder      Objective      Objective Measures updated at progress report unless specified.     Treatment   Bolded activities performed today:  Rosaura received the treatments listed below:      Per MD f/u 11/10/23:  - Tylenol and Ibuprofen as needed for pain. Perhaps take before PT/OT sessions  - Advance ROM to full elevation over the next 6 weeks. Continue to limit ER to neutral to protect subscapularis repair. May add AAROM.    therapeutic exercises to develop strength, endurance,  "ROM, flexibility, posture, and core stabilization for 06 minutes including:  UBE 3' fwd/3' bcwd  Wall slides (flexion/abduction) 12x5" ea  Shoulder IR stretch w/ strap 4x20"  Wall alphabet (w/ ball) x2    Resisted shoulder flexion/scaption/abduction (standing) x10 ea YTB  Tricep extension 3x10 RTB    Not Performed:   Pulleys (flexion and abductions) 3' ea  Banister slides (flex and abduction) x30 ea  Shoulder abduction (seated) x20 , 3# therabar  Prone L shoulder extension 10 x 3 with 1#  Prone Row 3x10 with 5#  Prone horizontal abduction 3x10 w/ 1#  AROM shoulder flexion (supine) x20 w/ passive over-pressure  Supine chest press w/ Red #4 Thera-wand 3 x 10 - with serratus punch  Supine Punch 3x10, 3#  Side-lying shoulder ER towel roll under arm 3x10, 2#    manual therapy techniques: Joint mobilizations, Soft tissue Mobilization, and ROM were applied to the: L shoulder for 6 minutes, including:  Passive ROM into L shoulder flexion in a scapular plain, abduction to 90 deg, IR to tolerance       neuromuscular re-education activities to improve: Coordination, Kinesthetic, and Posture for 47 minutes. The following activities were included:  Resisted Elbow extension x30 RTB  Resisted shoulder IR (towel roll under arm) 10x3 RTB  Resisted shoulder ER (towel roll under arm) 10x2 YTB  Rows 3x10 GTB  B Shoulder extension 3 x10 with green TB  Standing L shoulder flexion (punch) 10x3 with red TB - pinch in upper arm  Standing L shoulder adduction 10x3 with red TB  Shoulder flexion (seated) x20 , #3 therabar   Side lying L shoulder ER x 10 with 1#  Side lying  L abduction x 10  Rhythmic stabilization (all directions) therapist induced perturbations    Therapeutic activity x 0 minutes including:    cold pack for 10 minutes to left shoulder following treatment.     Patient Education and Home Exercises       Education provided:   - HEP  - Precautions, wearing sling    Written Home Exercises Provided: Patient instructed to cont prior " HEP. Exercises were reviewed and Rosaura was able to demonstrate them prior to the end of the session.  Rosaura demonstrated good  understanding of the education provided. See Electronic Medical Record under Patient Instructions for exercises provided during therapy sessions    Assessment     Ms. Kaplan tolerated progression of her exercises and manual interventions without increased discomfort other than with first set of standing L shoulder thera-band flexion - punch. well today. Increased reps and resistance for several exercises.  Concluded session with ice to the L shoulder.     Rosaura Is progressing well towards her goals.   Patient prognosis is Good.     Patient will continue to benefit from skilled outpatient physical therapy to address the deficits listed in the problem list box on initial evaluation, provide pt/family education and to maximize pt's level of independence in the home and community environment.     Patient's spiritual, cultural and educational needs considered and pt agreeable to plan of care and goals.     Anticipated Barriers for therapy: compliance     Goals:  Short Term Goals: 6 weeks   Pt will be instructed in an exercise program to address functional deficits related to L UE Sx.  Progressing 12/15/2023  Improve L shoulder ROM to >/= 120 degrees of flexion, 95 degrees of abduction, 35 degrees of external rotation and 25 degrees of internal rotation.  Progressing 12/15/2023  L shoulder girdle strength to >/= 2/5 into flexion, abduction, internal and external rotation -MET  Pt will report a decrease in L shoulder pain to </= 4/10  Progressing 12/15/2023     Long Term Goals: 15 weeks   Pt will be independent in a HEP to assist in managing their L UE Sx. Progressing 12/15/2023  Improve L shoulder ROM to >/= 150 degrees of flexion, 125 degrees of abduction, 50 degrees of external rotation and 35 degrees of internal rotation.  Progressing 12/15/2023  L shoulder girdle strength to >/= 2/5 into  flexion, abduction, internal and external rotation  Pt will report that she is able to dress, bath and groom with greater ease  Progressing 12/15/2023  Pt will report improved functional ability through an improved score on the FOTO shoulder survey  Progressing 12/15/2023    Plan   Extend Physical Therapy Plan of Care to 2/11/2024  Continue to progress per PT POC. Initiate UBE with no resistance next visit.  Stevan Sweet, PT

## 2024-01-09 ENCOUNTER — CLINICAL SUPPORT (OUTPATIENT)
Dept: REHABILITATION | Facility: HOSPITAL | Age: 49
End: 2024-01-09
Payer: MEDICAID

## 2024-01-09 DIAGNOSIS — M25.612 STIFFNESS OF LEFT SHOULDER JOINT: ICD-10-CM

## 2024-01-09 DIAGNOSIS — G89.29 CHRONIC LEFT SHOULDER PAIN: Primary | ICD-10-CM

## 2024-01-09 DIAGNOSIS — M25.512 CHRONIC LEFT SHOULDER PAIN: Primary | ICD-10-CM

## 2024-01-09 PROCEDURE — 97110 THERAPEUTIC EXERCISES: CPT | Mod: PO

## 2024-01-09 NOTE — PROGRESS NOTES
[facility]   Physical Therapy Treatment Note      Name: Rosaura Mitchell  Clinic Number: [id number]    Therapy Diagnosis:   Encounter Diagnoses   Name Primary?    Chronic left shoulder pain Yes    Stiffness of left shoulder joint        Physician: Ariel Valladares MD    Visit Date: 1/9/2024    Physician Orders: PT Eval and Treat   Medical Diagnosis from Referral: Status post total shoulder arthroplasty, left  Evaluation Date: 10/11/2023  Authorization Period Expiration: 10/09/2024  Plan of Care Expiration: 12/11/2023 extended to 2/11/2024  Progress Note Completed : 12/15/2023  Next Progress Note Due: 1/15/24  Date of Surgery: 9/28/2023  Visit # / Visits authorized: 3/ 20   FOTO: 2/ 3     Precautions: Standard, Diabetes, and Hodgkin lymphoma and L total shoulder 9/28/2023       Time In: 1100 AM  Time Out: 1153 AM  Total Billable Time:  30 minutes  (2 TE medicaid) + 10 min for ice to the L shoulder          PTA Visit #: 1/5       Subjective     Patient reports: that she had some pain in her L shoulder changing a diaper yesterday.     Per MD f/u 11/10/23:  - Tylenol and Ibuprofen as needed for pain. Perhaps take before PT/OT sessions  - Advance ROM to full elevation over the next 6 weeks. Continue to limit ER to neutral to protect subscapularis repair. May add AAROM.    She was compliant with home exercise program.  Response to previous treatment: sore  Functional change: ongoing    Pain: 5 /10  Location: left shoulder      Objective      Objective Measures updated at progress report unless specified.     Treatment   Bolded activities performed today:  Rosaura received the treatments listed below:      Per MD f/u 11/10/23:  - Tylenol and Ibuprofen as needed for pain. Perhaps take before PT/OT sessions  - Advance ROM to full elevation over the next 6 weeks. Continue to limit ER to neutral to protect subscapularis repair. May add AAROM.    therapeutic exercises to develop strength, endurance,  "ROM, flexibility, posture, and core stabilization for 06 minutes including:  UBE 3' fwd/3' bcwd  Wall slides (flexion/abduction) 12x5" ea  Shoulder IR stretch w/ strap 4x20"  Wall alphabet (w/ ball) x2  Tricep extension 3x10 RTB    Not Performed:   Pulleys (flexion and abductions) 3' ea  Banister slides (flex and abduction) x30 ea  Shoulder abduction (seated) x20 , 3# therabar  Prone L shoulder extension 10 x 3 with 1#  Prone Row 3x10 with 5#  Prone horizontal abduction 3x10 w/ 1#  AROM shoulder flexion (supine) x20 w/ passive over-pressure  Supine chest press w/ Red #4 Thera-wand 3 x 10 - with serratus punch  Supine Punch 3x10, 3#  Side-lying shoulder ER towel roll under arm 3x10, 2#  Resisted shoulder flexion/scaption/abduction (standing) x10 ea YTB    manual therapy techniques: Joint mobilizations, Soft tissue Mobilization, and ROM were applied to the: L shoulder for 6 minutes, including:  Passive ROM into L shoulder flexion in a scapular plain, abduction to 90 deg, IR to tolerance       neuromuscular re-education activities to improve: Coordination, Kinesthetic, and Posture for 47 minutes. The following activities were included:  Resisted Elbow extension x30 RTB  Resisted shoulder IR (towel roll under arm) 10x3 RTB  Resisted shoulder ER (towel roll under arm) 10x2 YTB  Rows 3x10 GTB  B Shoulder extension 3 x10 with green TB  Standing L shoulder flexion (punch) 10x3 with red TB - pinch in upper arm  Standing L shoulder adduction 10x3 with red TB  Shoulder flexion (seated) x20 , #3 therabar   Side lying L shoulder ER 2 x 10 with 1#  Side lying  L abduction x 10  Rhythmic stabilization (all directions) therapist induced perturbations    Therapeutic activity x 0 minutes including:    cold pack for 10 minutes to left shoulder following treatment.     Patient Education and Home Exercises       Education provided:   - HEP  - Precautions, wearing sling    Written Home Exercises Provided: Patient instructed to cont prior " HEP. Exercises were reviewed and Rosaura was able to demonstrate them prior to the end of the session.  Rosaura demonstrated good  understanding of the education provided. See Electronic Medical Record under Patient Instructions for exercises provided during therapy sessions    Assessment     Ms. Kaplan tolerated her exercises well today. Continue to progress ROM and strengthening exercises as tolerated.  Concluded session with ice to the L shoulder.     Rosaura Is progressing well towards her goals.   Patient prognosis is Good.     Patient will continue to benefit from skilled outpatient physical therapy to address the deficits listed in the problem list box on initial evaluation, provide pt/family education and to maximize pt's level of independence in the home and community environment.     Patient's spiritual, cultural and educational needs considered and pt agreeable to plan of care and goals.     Anticipated Barriers for therapy: compliance     Goals:  Short Term Goals: 6 weeks   Pt will be instructed in an exercise program to address functional deficits related to L UE Sx.  Progressing 12/15/2023  Improve L shoulder ROM to >/= 120 degrees of flexion, 95 degrees of abduction, 35 degrees of external rotation and 25 degrees of internal rotation.  Progressing 12/15/2023  L shoulder girdle strength to >/= 2/5 into flexion, abduction, internal and external rotation -MET  Pt will report a decrease in L shoulder pain to </= 4/10  Progressing 12/15/2023     Long Term Goals: 15 weeks   Pt will be independent in a HEP to assist in managing their L UE Sx. Progressing 12/15/2023  Improve L shoulder ROM to >/= 150 degrees of flexion, 125 degrees of abduction, 50 degrees of external rotation and 35 degrees of internal rotation.  Progressing 12/15/2023  L shoulder girdle strength to >/= 2/5 into flexion, abduction, internal and external rotation  Pt will report that she is able to dress, bath and groom with greater ease   Progressing 12/15/2023  Pt will report improved functional ability through an improved score on the FOTO shoulder survey  Progressing 12/15/2023    Plan   Extend Physical Therapy Plan of Care to 2/11/2024  Continue to progress per PT POC. Initiate UBE with no resistance next visit.  Stevan Sweet, PT

## 2024-01-10 NOTE — PROGRESS NOTES
[facility]   Physical Therapy Treatment Note      Name: Rosaura Mitchell  Clinic Number: [id number]    Therapy Diagnosis:   Encounter Diagnoses   Name Primary?    Chronic left shoulder pain Yes    Stiffness of left shoulder joint          Physician: Ariel Valladares MD    Visit Date: 1/11/2024    Physician Orders: PT Eval and Treat   Medical Diagnosis from Referral: Status post total shoulder arthroplasty, left  Evaluation Date: 10/11/2023  Authorization Period Expiration: 10/09/2024  Plan of Care Expiration: 12/11/2023 extended to 2/11/2024  Progress Note Completed : 12/15/2023  Next Progress Note Due: 1/15/24  Date of Surgery: 9/28/2023  Visit # / Visits authorized: 2/ 20   FOTO: 2/ 3     Precautions: Standard, Diabetes, and Hodgkin lymphoma and L total shoulder 9/28/2023       Time In: 1005 AM  Time Out: 1058 AM  Total Billable Time:  53 minutes  (4 TE medicaid) + 10 min for ice to the L shoulder          PTA Visit #: 1/5       Subjective     Patient reports: that she has some pain in the left upper trap and along parts of her incision still    Per MD f/u 11/10/23:  - Tylenol and Ibuprofen as needed for pain. Perhaps take before PT/OT sessions  - Advance ROM to full elevation over the next 6 weeks. Continue to limit ER to neutral to protect subscapularis repair. May add AAROM.    She was compliant with home exercise program.  Response to previous treatment: sore  Functional change: ongoing    Pain: 5 /10  Location: left shoulder      Objective      Objective Measures updated at progress report unless specified.     Treatment   Bolded activities performed today:  Rosaura received the treatments listed below:      Per MD f/u 11/10/23:  - Tylenol and Ibuprofen as needed for pain. Perhaps take before PT/OT sessions  - Advance ROM to full elevation over the next 6 weeks. Continue to limit ER to neutral to protect subscapularis repair. May add AAROM.    therapeutic exercises to develop  "strength, endurance, ROM, flexibility, posture, and core stabilization for 06 minutes including:  UBE 3' fwd/3' bcwd  Wall slides (flexion/abduction) 12x5" ea  Shoulder IR stretch w/ strap 4x20"  Wall alphabet (w/ ball) x2    Resisted shoulder flexion/scaption/abduction (standing) x10 ea YTB  Tricep extension 3x10 RTB    manual therapy techniques: Joint mobilizations, Soft tissue Mobilization, and ROM were applied to the: L shoulder for 6 minutes, including:  Passive ROM into L shoulder flexion in a scapular plain, abduction to 90 deg, IR to tolerance       neuromuscular re-education activities to improve: Coordination, Kinesthetic, and Posture for 47 minutes. The following activities were included:  Resisted Elbow extension x30 RTB  Resisted shoulder IR (towel roll under arm) 10x3 RTB  Resisted shoulder ER (towel roll under arm) 10x3 RTB  Rows 3x10 GTB  B Shoulder extension 3 x10 with BlackTB  Standing L shoulder flexion (punch) 10x3 with red TB - pinch in upper arm  Standing L shoulder adduction 10x3 with red TB  Shoulder flexion (seated) x20 , #4 therabar   Side lying L shoulder ER x 10 with 1#      Therapeutic activity x 0 minutes including:    cold pack for 0 minutes to left shoulder following treatment.     Patient Education and Home Exercises       Education provided:   - HEP  - Precautions, wearing sling    Written Home Exercises Provided: Patient instructed to cont prior HEP. Exercises were reviewed and Rosaura was able to demonstrate them prior to the end of the session.  Rosaura demonstrated good  understanding of the education provided. See Electronic Medical Record under Patient Instructions for exercises provided during therapy sessions    Assessment     Ms. Kaplan continues to tolerate treatment well. Requires mild cueing with AROM as she still side bends trunk for compensation. Tissue tightness noted in the L upper trap along the tissue that she points out as been painful/sore. Performed STM to tissue " along with lele-scapular tissues with positive tissue response. Increased reps and resistance for several exercises.  Concluded session with ice to the L shoulder.     Rosaura Is progressing well towards her goals.   Patient prognosis is Good.     Patient will continue to benefit from skilled outpatient physical therapy to address the deficits listed in the problem list box on initial evaluation, provide pt/family education and to maximize pt's level of independence in the home and community environment.     Patient's spiritual, cultural and educational needs considered and pt agreeable to plan of care and goals.     Anticipated Barriers for therapy: compliance     Goals:  Short Term Goals: 6 weeks   Pt will be instructed in an exercise program to address functional deficits related to L UE Sx.  Progressing 12/15/2023  Improve L shoulder ROM to >/= 120 degrees of flexion, 95 degrees of abduction, 35 degrees of external rotation and 25 degrees of internal rotation.  Progressing 12/15/2023  L shoulder girdle strength to >/= 2/5 into flexion, abduction, internal and external rotation -MET  Pt will report a decrease in L shoulder pain to </= 4/10  Progressing 12/15/2023     Long Term Goals: 15 weeks   Pt will be independent in a HEP to assist in managing their L UE Sx. Progressing 12/15/2023  Improve L shoulder ROM to >/= 150 degrees of flexion, 125 degrees of abduction, 50 degrees of external rotation and 35 degrees of internal rotation.  Progressing 12/15/2023  L shoulder girdle strength to >/= 2/5 into flexion, abduction, internal and external rotation  Pt will report that she is able to dress, bath and groom with greater ease  Progressing 12/15/2023  Pt will report improved functional ability through an improved score on the FOTO shoulder survey  Progressing 12/15/2023    Plan   Extend Physical Therapy Plan of Care to 2/11/2024  Continue to progress per PT POC. Initiate UBE with no resistance next visit.  Edelmira Barba  PT

## 2024-01-11 ENCOUNTER — CLINICAL SUPPORT (OUTPATIENT)
Dept: REHABILITATION | Facility: HOSPITAL | Age: 49
End: 2024-01-11
Payer: MEDICAID

## 2024-01-11 DIAGNOSIS — M25.612 STIFFNESS OF LEFT SHOULDER JOINT: ICD-10-CM

## 2024-01-11 DIAGNOSIS — G89.29 CHRONIC LEFT SHOULDER PAIN: Primary | ICD-10-CM

## 2024-01-11 DIAGNOSIS — M25.512 CHRONIC LEFT SHOULDER PAIN: Primary | ICD-10-CM

## 2024-01-11 PROCEDURE — 97110 THERAPEUTIC EXERCISES: CPT | Mod: PO

## 2024-01-16 NOTE — PROGRESS NOTES
[facility]   Physical Therapy Treatment / Progress Note      Name: Rosaura Mitchell  Clinic Number: [id number]    Therapy Diagnosis:   Encounter Diagnoses   Name Primary?    Chronic left shoulder pain Yes    Stiffness of left shoulder joint          Physician: Ariel Valladares MD    Visit Date: 1/18/2024    Physician Orders: PT Eval and Treat   Medical Diagnosis from Referral: Status post total shoulder arthroplasty, left  Evaluation Date: 10/11/2023  Authorization Period Expiration: 10/09/2024  Plan of Care Expiration: 12/11/2023 extended to 2/11/2024  Progress Note Completed : 1/18/2024  Next Progress Note Due: 2/11/2024  Date of Surgery: 9/28/2023  Visit # / Visits authorized: 5/ 20   FOTO: 2/ 3     Precautions: Standard, Diabetes, and Hodgkin lymphoma and L total shoulder 9/28/2023       Time In: 1000 AM  Time Out: 1055 AM  Total Billable Time:  55 minutes  (4 TE medicaid)           PTA Visit #: 1/5       Subjective     Patient reports: that she may have slept wrong one day and noticed that the next morning she had a lot of pain in the top of her shoulder that was sharp and felt like a pinch.    Per MD f/u 11/10/23:  - Tylenol and Ibuprofen as needed for pain. Perhaps take before PT/OT sessions  - Advance ROM to full elevation over the next 6 weeks. Continue to limit ER to neutral to protect subscapularis repair. May add AAROM.    She was compliant with home exercise program.  Response to previous treatment: sore  Functional change: ongoing    Pain: 5 /10  Location: left shoulder      Objective      Objective Measures updated at progress report unless specified.     BOLD INDICATES UPDATED MEASURES      Observation: pt ambulates with normal gait mechanics and arm swing     Posture: protracted L shoulder girdle in a post-op sling        Passive Range of Motion:   Shoulder Left Right 12/15/23  L PROM 1/16/24  L PROM   Flexion 120 nt 158 160   Abduction nt nt 120 125   ER at 45 12 nt 40  "52   ER at 90 nt nt NT NT   IR 50 (to abdomen) nt 60 70      Active Range of Motion:   Shoulder Left Right 12/15/23  L AROM  1/16/24  L AROM   Flexion nt nt 105 120   Abduction nt t 93 112   ER at 0 nt nt NT NT   ER at 45 nt nt 40 45   IR nt nt 55 60      Upper Extremity Strength    (L) UE (R) UE 12/15/23  L MMT 1/16/24  L MMT   Shoulder flexion: nt nt 2+ 3+ w/ mild pain   Shoulder Abduction: nt nt 2+ w/ arm at side 4-   Shoulder ER nt nt 3+ 4   Shoulder IR nt nt 3+ 4   Lower Trap nt nt NT NT   Middle Trap nt nt NT NT   Rhomboids nt nt NT NT         Treatment   Bolded activities performed today:  Rosaura received the treatments listed below:      Per MD f/u 11/10/23:  - Tylenol and Ibuprofen as needed for pain. Perhaps take before PT/OT sessions  - Advance ROM to full elevation over the next 6 weeks. Continue to limit ER to neutral to protect subscapularis repair. May add AAROM.    therapeutic exercises to develop strength, endurance, ROM, flexibility, posture, and core stabilization for 06 minutes including:  UBE 3' fwd/3' bcwd lvl 2  Wall slides (flexion/abduction) 12x5" ea  Shoulder IR stretch w/ strap 4x20"  Wall alphabet (w/ ball) x2    Resisted shoulder flexion/scaption/abduction (standing) x10 ea YTB  Tricep extension 3x10 RTB    manual therapy techniques: Joint mobilizations, Soft tissue Mobilization, and ROM were applied to the: L shoulder for 6 minutes, including:  Passive ROM into L shoulder flexion in a scapular plain, abduction to 90 deg, IR to tolerance       neuromuscular re-education activities to improve: Coordination, Kinesthetic, and Posture for 49 minutes. The following activities were included:  Resisted Elbow extension x30 BlackTB  Resisted shoulder IR (towel roll under arm) 10x3 RTB  Resisted shoulder ER (towel roll under arm) 10x3 RTB  Rows 3x10 GTB  B Shoulder extension 3 x10 with BlackTB  Standing L shoulder flexion (punch) 10x3 with red TB - pinch in upper arm  Standing L shoulder adduction " 10x3 with red TB  Shoulder flexion (seated) x20 , #4 therabar   Side lying L shoulder ER x 30 with 2#      Therapeutic activity x 0 minutes including:    cold pack for 0 minutes to left shoulder following treatment.     Patient Education and Home Exercises       Education provided:   - HEP  - Precautions, wearing sling    Written Home Exercises Provided: Patient instructed to cont prior HEP. Exercises were reviewed and Rosaura was able to demonstrate them prior to the end of the session.  Rosaura demonstrated good  understanding of the education provided. See Electronic Medical Record under Patient Instructions for exercises provided during therapy sessions    Assessment     Ms. Kaplan arrives to clinic report taht she may have slept wrong the other day and had pain in her shoulder. Updated objective measures today with improved A/PROM and strength. She also reports improved tolerance for daily functional activities while still maintaining precaution. Pain only presents with end-range motions and if she sleeps on shoulder. Will continue to progress as tolerated.    Rosaura Is progressing well towards her goals.   Patient prognosis is Good.     Patient will continue to benefit from skilled outpatient physical therapy to address the deficits listed in the problem list box on initial evaluation, provide pt/family education and to maximize pt's level of independence in the home and community environment.     Patient's spiritual, cultural and educational needs considered and pt agreeable to plan of care and goals.     Anticipated Barriers for therapy: compliance     Goals:  Short Term Goals: 6 weeks   Pt will be instructed in an exercise program to address functional deficits related to L UE Sx.  Progressing 12/15/2023  Improve L shoulder ROM to >/= 120 degrees of flexion, 95 degrees of abduction, 35 degrees of external rotation and 25 degrees of internal rotation.  Progressing 12/15/2023  L shoulder girdle strength to >/= 2/5  into flexion, abduction, internal and external rotation -MET  Pt will report a decrease in L shoulder pain to </= 4/10  Progressing 12/15/2023     Long Term Goals: 15 weeks   Pt will be independent in a HEP to assist in managing their L UE Sx. Progressing 12/15/2023  Improve L shoulder ROM to >/= 150 degrees of flexion, 125 degrees of abduction, 50 degrees of external rotation and 35 degrees of internal rotation.  Progressing 12/15/2023  L shoulder girdle strength to >/= 2/5 into flexion, abduction, internal and external rotation  Pt will report that she is able to dress, bath and groom with greater ease  Progressing 12/15/2023  Pt will report improved functional ability through an improved score on the FOTO shoulder survey  Progressing 12/15/2023    Plan   Extend Physical Therapy Plan of Care to 2/11/2024  Continue to progress per PT POC. Initiate UBE with no resistance next visit.  Edelmira Barba, PT

## 2024-01-18 ENCOUNTER — CLINICAL SUPPORT (OUTPATIENT)
Dept: REHABILITATION | Facility: HOSPITAL | Age: 49
End: 2024-01-18
Payer: MEDICAID

## 2024-01-18 DIAGNOSIS — M25.612 STIFFNESS OF LEFT SHOULDER JOINT: ICD-10-CM

## 2024-01-18 DIAGNOSIS — G89.29 CHRONIC LEFT SHOULDER PAIN: Primary | ICD-10-CM

## 2024-01-18 DIAGNOSIS — M25.512 CHRONIC LEFT SHOULDER PAIN: Primary | ICD-10-CM

## 2024-01-18 PROCEDURE — 97110 THERAPEUTIC EXERCISES: CPT | Mod: PO

## 2024-01-23 ENCOUNTER — CLINICAL SUPPORT (OUTPATIENT)
Dept: REHABILITATION | Facility: HOSPITAL | Age: 49
End: 2024-01-23
Payer: MEDICAID

## 2024-01-23 DIAGNOSIS — G89.29 CHRONIC LEFT SHOULDER PAIN: Primary | ICD-10-CM

## 2024-01-23 DIAGNOSIS — M25.512 CHRONIC LEFT SHOULDER PAIN: Primary | ICD-10-CM

## 2024-01-23 DIAGNOSIS — M25.612 STIFFNESS OF LEFT SHOULDER JOINT: ICD-10-CM

## 2024-01-23 PROCEDURE — 97110 THERAPEUTIC EXERCISES: CPT | Mod: PO

## 2024-01-23 NOTE — PROGRESS NOTES
[facility] OUTPATIENT THERAPY AND WELLNESS   Physical Therapy Treatment Note      Name: Rosaura Mitchell  Clinic Number: [id number]    Therapy Diagnosis:   Encounter Diagnoses   Name Primary?    Chronic left shoulder pain Yes    Stiffness of left shoulder joint            Physician: Ariel Valladares MD    Visit Date: 1/23/2024    Physician Orders: PT Eval and Treat   Medical Diagnosis from Referral: Status post total shoulder arthroplasty, left  Evaluation Date: 10/11/2023  Authorization Period Expiration: 10/09/2024  Plan of Care Expiration: 12/11/2023 extended to 2/11/2024  Progress Note Completed : 1/18/2024  Next Progress Note Due: 2/18/2024  Date of Surgery: 9/28/2023  Visit # / Visits authorized: 6/ 20   FOTO: 2/ 3     Precautions: Standard, Diabetes, and Hodgkin lymphoma and L total shoulder 9/28/2023       Time In: 1104 AM  Time Out: 1146 AM  Total Billable Time:  42 minutes  (4 TE medicaid)           PTA Visit #: 1/5       Subjective     Patient reports: that she may have slept wrong one day and noticed that the next morning she had a lot of pain in the top of her shoulder that was sharp and felt like a pinch.    Per MD f/u 11/10/23:  - Tylenol and Ibuprofen as needed for pain. Perhaps take before PT/OT sessions  - Advance ROM to full elevation over the next 6 weeks. Continue to limit ER to neutral to protect subscapularis repair. May add AAROM.    She was compliant with home exercise program.  Response to previous treatment: sore  Functional change: ongoing    Pain: 5 /10  Location: left shoulder      Objective      Objective Measures updated at progress report unless specified.     BOLD INDICATES UPDATED MEASURES      Observation: pt ambulates with normal gait mechanics and arm swing     Posture: protracted L shoulder girdle in a post-op sling        Passive Range of Motion:   Shoulder Left Right 12/15/23  L PROM 1/16/24  L PROM   Flexion 120 nt 158 160   Abduction nt nt 120 125   ER at 45 12 nt 40 52   ER  at 90 nt nt NT NT   IR 50 (to abdomen) nt 60 70      Active Range of Motion:   Shoulder Left Right 12/15/23  L AROM  1/16/24  L AROM   Flexion nt nt 105 120   Abduction nt t 93 112   ER at 0 nt nt NT NT   ER at 45 nt nt 40 45   IR nt nt 55 60      Upper Extremity Strength    (L) UE (R) UE 12/15/23  L MMT 1/16/24  L MMT   Shoulder flexion: nt nt 2+ 3+ w/ mild pain   Shoulder Abduction: nt nt 2+ w/ arm at side 4-   Shoulder ER nt nt 3+ 4   Shoulder IR nt nt 3+ 4   Lower Trap nt nt NT NT   Middle Trap nt nt NT NT   Rhomboids nt nt NT NT         Treatment   Bolded activities performed today:  Rosaura received the treatments listed below:      Per MD f/u 11/10/23:  - Tylenol and Ibuprofen as needed for pain. Perhaps take before PT/OT sessions  - Advance ROM to full elevation over the next 6 weeks. Continue to limit ER to neutral to protect subscapularis repair. May add AAROM.    therapeutic exercises to develop strength, endurance, ROM, flexibility, posture, and core stabilization for 06 minutes including:  UBE 3' fwd/3' bcwd lvl 2  Resisted Elbow extension x30 BlackTB  Resisted shoulder IR (towel roll under arm) 10x3 RTB  Resisted shoulder ER (towel roll under arm) 10x3 RTB  Rows 3x10 BlueTB  B Shoulder extension 3 x10 with BlackTB    Tricep extension 3x10 RTB  +resisted shoulder IR @ 90 deg abduction  +prone on SB Y's/T's x30 ea  +biceps curl and OH shoulder press x20, 1#  +static weight hold (1#) for 2 laps    Not Performed:  Resisted shoulder flexion/scaption/abduction (standing) x10 ea YTB    manual therapy techniques: Joint mobilizations, Soft tissue Mobilization, and ROM were applied to the: L shoulder for 6 minutes, including:  Passive ROM into L shoulder flexion in a scapular plain, abduction to 90 deg, IR to tolerance       neuromuscular re-education activities to improve: Coordination, Kinesthetic, and Posture for 0 minutes. The following activities were included:    Standing L shoulder flexion (punch) 10x3 with  red TB - pinch in upper arm  Standing L shoulder adduction 10x3 with red TB  Shoulder flexion (seated) x20 , #4 therabar   Side lying L shoulder ER x 30 with 2#      Therapeutic activity x 0 minutes including:    cold pack for 0 minutes to left shoulder following treatment.     Patient Education and Home Exercises       Education provided:   - HEP  - Precautions, wearing sling    Written Home Exercises Provided: Patient instructed to cont prior HEP. Exercises were reviewed and Rosaura was able to demonstrate them prior to the end of the session.  Rosaura demonstrated good  understanding of the education provided. See Electronic Medical Record under Patient Instructions for exercises provided during therapy sessions    Assessment     Ms. Kaplan arrives to clinic with no new reports. Introduced more AROM and strengthening exercises today that included resisted shoulder IR, static weight hold and curls with OH press. Patient demonstrated ability to maintain upright posturing with no compensation of trunk or upper trapezius. She did report some pain in the anterior shoulder with perturbations. Otherwise good tolerance to treatment.     Rosaura Is progressing well towards her goals.   Patient prognosis is Good.     Patient will continue to benefit from skilled outpatient physical therapy to address the deficits listed in the problem list box on initial evaluation, provide pt/family education and to maximize pt's level of independence in the home and community environment.     Patient's spiritual, cultural and educational needs considered and pt agreeable to plan of care and goals.     Anticipated Barriers for therapy: compliance     Goals:  Short Term Goals: 6 weeks   Pt will be instructed in an exercise program to address functional deficits related to L UE Sx.  Progressing 12/15/2023  Improve L shoulder ROM to >/= 120 degrees of flexion, 95 degrees of abduction, 35 degrees of external rotation and 25 degrees of internal  rotation.  Progressing 12/15/2023  L shoulder girdle strength to >/= 2/5 into flexion, abduction, internal and external rotation -MET  Pt will report a decrease in L shoulder pain to </= 4/10  Progressing 12/15/2023     Long Term Goals: 15 weeks   Pt will be independent in a HEP to assist in managing their L UE Sx. Progressing 12/15/2023  Improve L shoulder ROM to >/= 150 degrees of flexion, 125 degrees of abduction, 50 degrees of external rotation and 35 degrees of internal rotation.  Progressing 12/15/2023  L shoulder girdle strength to >/= 2/5 into flexion, abduction, internal and external rotation  Pt will report that she is able to dress, bath and groom with greater ease  Progressing 12/15/2023  Pt will report improved functional ability through an improved score on the FOTO shoulder survey  Progressing 12/15/2023    Plan   Extend Physical Therapy Plan of Care to 2/11/2024  Continue to progress per PT POC. Initiate UBE with no resistance next visit.  Edelmira Barba, PT

## 2024-01-24 NOTE — PROGRESS NOTES
[facility]   Physical Therapy Treatment Note      Name: Rosaura Mitchell  Clinic Number: [id number]    Therapy Diagnosis:   Encounter Diagnoses   Name Primary?    Chronic left shoulder pain Yes    Stiffness of left shoulder joint        Physician: Ariel Valladares MD    Visit Date: 1/25/2024    Physician Orders: PT Eval and Treat   Medical Diagnosis from Referral: Status post total shoulder arthroplasty, left  Evaluation Date: 10/11/2023  Authorization Period Expiration: 10/09/2024  Plan of Care Expiration: 12/11/2023 extended to 2/11/2024  Progress Note Completed : 1/18/2024  Next Progress Note Due: 2/18/2024  Date of Surgery: 9/28/2023  Visit # / Visits authorized: 7/ 20   FOTO: 2/ 3     Precautions: Standard, Diabetes, and Hodgkin lymphoma and L total shoulder 9/28/2023       Time In: 1102 AM  Time Out: 1155 AM  Total Billable Time:  53 minutes  (4 TE medicaid)           PTA Visit #: 1/5       Subjective     Patient reports: that she still has been getting some pain with shoulder elevation that is sharp but quick.     Per MD f/u 11/10/23:  - Tylenol and Ibuprofen as needed for pain. Perhaps take before PT/OT sessions  - Advance ROM to full elevation over the next 6 weeks. Continue to limit ER to neutral to protect subscapularis repair. May add AAROM.    She was compliant with home exercise program.  Response to previous treatment: sore  Functional change: ongoing    Pain: 5 /10  Location: left shoulder      Objective      Objective Measures updated at progress report unless specified.     BOLD INDICATES UPDATED MEASURES      Observation: pt ambulates with normal gait mechanics and arm swing     Posture: protracted L shoulder girdle in a post-op sling        Passive Range of Motion:   Shoulder Left Right 12/15/23  L PROM 1/16/24  L PROM   Flexion 120 nt 158 160   Abduction nt nt 120 125   ER at 45 12 nt 40 52   ER at 90 nt nt NT NT   IR 50 (to abdomen) nt 60 70      Active Range of  "Motion:   Shoulder Left Right 12/15/23  L AROM  1/16/24  L AROM   Flexion nt nt 105 120   Abduction nt t 93 112   ER at 0 nt nt NT NT   ER at 45 nt nt 40 45   IR nt nt 55 60      Upper Extremity Strength    (L) UE (R) UE 12/15/23  L MMT 1/16/24  L MMT   Shoulder flexion: nt nt 2+ 3+ w/ mild pain   Shoulder Abduction: nt nt 2+ w/ arm at side 4-   Shoulder ER nt nt 3+ 4   Shoulder IR nt nt 3+ 4   Lower Trap nt nt NT NT   Middle Trap nt nt NT NT   Rhomboids nt nt NT NT         Treatment   Bolded activities performed today:  Rosaura received the treatments listed below:      Per MD f/u 11/10/23:  - Tylenol and Ibuprofen as needed for pain. Perhaps take before PT/OT sessions  - Advance ROM to full elevation over the next 6 weeks. Continue to limit ER to neutral to protect subscapularis repair. May add AAROM.    therapeutic exercises to develop strength, endurance, ROM, flexibility, posture, and core stabilization for 39 minutes including:  UBE 3' fwd/3' bcwd lvl 2  Tricep extension 3x10 RTB  resisted shoulder IR @ 90 deg abduction  prone on SB Y's/T's x30 ea  biceps curl and OH shoulder press x20, 2#  static weight hold (1#) for 2 laps    Not Performed:  Resisted shoulder flexion/scaption/abduction (standing) x10 ea YTB  Resisted Elbow extension x30 BlackTB  Resisted shoulder IR (towel roll under arm) 10x3 RTB  Resisted shoulder ER (towel roll under arm) 10x3 RTB  Rows 3x10 BlueTB  B Shoulder extension 3 x10 with BlackTB    manual therapy techniques: Joint mobilizations, Soft tissue Mobilization, and ROM were applied to the: L shoulder for 10 minutes, including:    Scapular mobilization medial/lateral glides, superior/inferior glides      neuromuscular re-education activities to improve: Coordination, Kinesthetic, and Posture for 4 minutes. The following activities were included:  +Scapular depression (side-lying) 20x3" L    Not performed:  Standing L shoulder flexion (punch) 10x3 with red TB - pinch in upper arm  Standing " L shoulder adduction 10x3 with red TB  Shoulder flexion (seated) x20 , #4 therabar   Side lying L shoulder ER x 30 with 2#      Patient Education and Home Exercises       Education provided:   - HEP  - Precautions, wearing sling    Written Home Exercises Provided: Patient instructed to cont prior HEP. Exercises were reviewed and Rosaura was able to demonstrate them prior to the end of the session.  Rosaura demonstrated good  understanding of the education provided. See Electronic Medical Record under Patient Instructions for exercises provided during therapy sessions    Assessment     Ms. Kaplan arrives to clinic reporting a sharp pinching sensation with shoulder flexion ~ degrees. Performed scapular mobilizations with and without arm movement, which yielded decreased pain with motion. Patient also performed scapular depression and retraction in side-lying to assure that all fibers of the trapezius are able to be recruited and properly engaged. Completed session with no exacerbating symptoms. Patient will be going on vacation for the next week but has been encouraged to continue with stretches and exercises to maintain progress. Will assess upon return.       Rosaura Is progressing well towards her goals.   Patient prognosis is Good.     Patient will continue to benefit from skilled outpatient physical therapy to address the deficits listed in the problem list box on initial evaluation, provide pt/family education and to maximize pt's level of independence in the home and community environment.     Patient's spiritual, cultural and educational needs considered and pt agreeable to plan of care and goals.     Anticipated Barriers for therapy: compliance     Goals:  Short Term Goals: 6 weeks   Pt will be instructed in an exercise program to address functional deficits related to L UE Sx.  Progressing 12/15/2023  Improve L shoulder ROM to >/= 120 degrees of flexion, 95 degrees of abduction, 35 degrees of external  rotation and 25 degrees of internal rotation.  Progressing 12/15/2023  L shoulder girdle strength to >/= 2/5 into flexion, abduction, internal and external rotation -MET  Pt will report a decrease in L shoulder pain to </= 4/10  Progressing 12/15/2023     Long Term Goals: 15 weeks   Pt will be independent in a HEP to assist in managing their L UE Sx. Progressing 12/15/2023  Improve L shoulder ROM to >/= 150 degrees of flexion, 125 degrees of abduction, 50 degrees of external rotation and 35 degrees of internal rotation.  Progressing 12/15/2023  L shoulder girdle strength to >/= 2/5 into flexion, abduction, internal and external rotation  Pt will report that she is able to dress, bath and groom with greater ease  Progressing 12/15/2023  Pt will report improved functional ability through an improved score on the FOTO shoulder survey  Progressing 12/15/2023    Plan   Extend Physical Therapy Plan of Care to 2/11/2024  Continue to progress per PT POC. Initiate UBE with no resistance next visit.  Edelmira Barba, PT

## 2024-01-25 ENCOUNTER — CLINICAL SUPPORT (OUTPATIENT)
Dept: REHABILITATION | Facility: HOSPITAL | Age: 49
End: 2024-01-25
Payer: MEDICAID

## 2024-01-25 DIAGNOSIS — M25.512 CHRONIC LEFT SHOULDER PAIN: Primary | ICD-10-CM

## 2024-01-25 DIAGNOSIS — G89.29 CHRONIC LEFT SHOULDER PAIN: Primary | ICD-10-CM

## 2024-01-25 DIAGNOSIS — M25.612 STIFFNESS OF LEFT SHOULDER JOINT: ICD-10-CM

## 2024-01-25 PROCEDURE — 97110 THERAPEUTIC EXERCISES: CPT | Mod: PO

## 2024-02-06 ENCOUNTER — CLINICAL SUPPORT (OUTPATIENT)
Dept: REHABILITATION | Facility: HOSPITAL | Age: 49
End: 2024-02-06
Payer: MEDICAID

## 2024-02-06 DIAGNOSIS — M25.612 STIFFNESS OF LEFT SHOULDER JOINT: ICD-10-CM

## 2024-02-06 DIAGNOSIS — G89.29 CHRONIC LEFT SHOULDER PAIN: Primary | ICD-10-CM

## 2024-02-06 DIAGNOSIS — M25.512 CHRONIC LEFT SHOULDER PAIN: Primary | ICD-10-CM

## 2024-02-06 PROCEDURE — 97110 THERAPEUTIC EXERCISES: CPT | Mod: PO,CQ

## 2024-02-06 NOTE — PROGRESS NOTES
[facility]   Physical Therapy Treatment Note      Name: Rosaura Mitchell  Clinic Number: [id number]    Therapy Diagnosis:   Encounter Diagnoses   Name Primary?    Chronic left shoulder pain Yes    Stiffness of left shoulder joint          Physician: Ariel Valladares MD    Visit Date: 2/6/2024    Physician Orders: PT Eval and Treat   Medical Diagnosis from Referral: Status post total shoulder arthroplasty, left  Evaluation Date: 10/11/2023  Authorization Period Expiration: 10/09/2024  Plan of Care Expiration: 12/11/2023 extended to 2/11/2024  Progress Note Completed : 1/18/2024  Next Progress Note Due: 2/18/2024  Date of Surgery: 9/28/2023  Visit # / Visits authorized: 8/ 20   FOTO: 2/ 3     Precautions: Standard, Diabetes, and Hodgkin lymphoma and L total shoulder 9/28/2023       Time In: 10:00 am  Time Out: 10:51 am  Total Billable Time:  51 minutes  (3 TE medicaid)           PTA Visit #: 1/5     Per MD f/u 11/10/23:  - Tylenol and Ibuprofen as needed for pain. Perhaps take before PT/OT sessions  - Advance ROM to full elevation over the next 6 weeks. Continue to limit ER to neutral to protect subscapularis repair. May add AAROM.    Subjective     Patient reports: her shoulder is feeling better and she has been able to raise it to fix her hair without the sharp pain.   She was compliant with home exercise program.  Response to previous treatment: sore  Functional change: ongoing    Pain: 5 /10  Location: left shoulder      Objective      Objective Measures updated at progress report unless specified.      Treatment     Bolded activities performed today:    Rosaura received the treatments listed below:      therapeutic exercises to develop strength, endurance, ROM, flexibility, posture, and core stabilization for 30 minutes including:  UBE 3' fwd/3' bcwd lvl 2  Tricep extension 3x10 RTB  resisted shoulder IR @ 90 deg abduction, green band  prone on SB Y's/T's x30 ea  biceps curl and OH  "shoulder press x20, 2#  static weight hold (1#) for 2 laps    Not Performed:  Resisted shoulder flexion/scaption/abduction (standing) x10 ea YTB  Resisted Elbow extension x30 BlackTB  Resisted shoulder IR (towel roll under arm) 10x3 RTB  Resisted shoulder ER (towel roll under arm) 10x3 RTB  Rows 3x10 BlueTB  B Shoulder extension 3 x10 with BlackTB    manual therapy techniques: Joint mobilizations, Soft tissue Mobilization, and ROM were applied to the: L shoulder for 10 minutes, including:    Scapular mobilization medial/lateral glides, superior/inferior glides      neuromuscular re-education activities to improve: Coordination, Kinesthetic, and Posture for 11 minutes. The following activities were included:  Scapular depression (side-lying) 20x3" L  Standing L shoulder flexion (punch) 10x3 with red TB   Standing L shoulder adduction 10x3 with red TB  Shoulder flexion (seated) x20 , #4 therabar   Side lying L shoulder ER x 30 with 2#      Patient Education and Home Exercises       Education provided:   - HEP  - Precautions, wearing sling    Written Home Exercises Provided: Patient instructed to cont prior HEP. Exercises were reviewed and Rosaura was able to demonstrate them prior to the end of the session.  Rosaura demonstrated good  understanding of the education provided. See Electronic Medical Record under Patient Instructions for exercises provided during therapy sessions    Assessment     Rosaura presents to treatment reporting less pain and sharp pinching sensation with overhead movements. She returns to the clinic after being on vacation for a week. She has been compliant with HEP while gone. She was able to tolerate exercises with no sharp pains today including adding back exercises she was previously unable to complete without discomfort. Continue to progress as tolerated. .       Rosaura Is progressing well towards her goals.   Patient prognosis is Good.     Patient will continue to benefit from skilled outpatient " physical therapy to address the deficits listed in the problem list box on initial evaluation, provide pt/family education and to maximize pt's level of independence in the home and community environment.     Patient's spiritual, cultural and educational needs considered and pt agreeable to plan of care and goals.     Anticipated Barriers for therapy: compliance     Goals:  Short Term Goals: 6 weeks   Pt will be instructed in an exercise program to address functional deficits related to L UE Sx.  Progressing 12/15/2023  Improve L shoulder ROM to >/= 120 degrees of flexion, 95 degrees of abduction, 35 degrees of external rotation and 25 degrees of internal rotation.  Progressing 12/15/2023  L shoulder girdle strength to >/= 2/5 into flexion, abduction, internal and external rotation -MET  Pt will report a decrease in L shoulder pain to </= 4/10  Progressing 12/15/2023     Long Term Goals: 15 weeks   Pt will be independent in a HEP to assist in managing their L UE Sx. Progressing 12/15/2023  Improve L shoulder ROM to >/= 150 degrees of flexion, 125 degrees of abduction, 50 degrees of external rotation and 35 degrees of internal rotation.  Progressing 12/15/2023  L shoulder girdle strength to >/= 2/5 into flexion, abduction, internal and external rotation  Pt will report that she is able to dress, bath and groom with greater ease  Progressing 12/15/2023  Pt will report improved functional ability through an improved score on the FOTO shoulder survey  Progressing 12/15/2023    Plan   Extend Physical Therapy Plan of Care to 2/11/2024  Continue to progress per PT POC. Initiate UBE with no resistance next visit.    Latricia Roberson, PTA

## 2024-02-14 ENCOUNTER — CLINICAL SUPPORT (OUTPATIENT)
Dept: REHABILITATION | Facility: HOSPITAL | Age: 49
End: 2024-02-14
Payer: MEDICAID

## 2024-02-14 DIAGNOSIS — M25.612 STIFFNESS OF LEFT SHOULDER JOINT: ICD-10-CM

## 2024-02-14 DIAGNOSIS — G89.29 CHRONIC LEFT SHOULDER PAIN: Primary | ICD-10-CM

## 2024-02-14 DIAGNOSIS — M25.512 CHRONIC LEFT SHOULDER PAIN: Primary | ICD-10-CM

## 2024-02-14 PROCEDURE — 97110 THERAPEUTIC EXERCISES: CPT | Mod: PO

## 2024-02-14 NOTE — PROGRESS NOTES
[facility]   Physical Therapy Treatment Note      Name: Rosaura Mitchell  Clinic Number: [id number]    Therapy Diagnosis:   Encounter Diagnoses   Name Primary?    Chronic left shoulder pain Yes    Stiffness of left shoulder joint            Physician: Ariel Valladares MD    Visit Date: 2/14/2024    Physician Orders: PT Eval and Treat   Medical Diagnosis from Referral: Status post total shoulder arthroplasty, left  Evaluation Date: 10/11/2023  Authorization Period Expiration: 10/09/2024  Plan of Care Expiration: 12/11/2023 extended to 2/11/2024  Progress Note Completed : 1/18/2024  Next Progress Note Due: 2/18/2024  Date of Surgery: 9/28/2023  Visit # / Visits authorized: 9/ 20   FOTO: 3/ 3     Precautions: Standard, Diabetes, and Hodgkin lymphoma and L total shoulder 9/28/2023       Time In: 10:00 am  Time Out: 10:51 am  Total Billable Time:  25 minutes  (3 TE medicaid)           PTA Visit #: 1/5     Per MD f/u 11/10/23:  - Tylenol and Ibuprofen as needed for pain. Perhaps take before PT/OT sessions  - Advance ROM to full elevation over the next 6 weeks. Continue to limit ER to neutral to protect subscapularis repair. May add AAROM.    Subjective     Patient reports: her shoulder is feeling better and she has been able to to make a pony tail independently and pain free.  Pt reported that she missed her last appointment because she got busy at work and forgot.  She was compliant with home exercise program.  Response to previous treatment: sore  Functional change: ongoing    Pain: 0/10  Location: left shoulder      Objective      Objective Measures updated at progress report unless specified.      Objective Measures updated at progress report unless specified.      BOLD INDICATES UPDATED MEASURES        Observation: pt ambulates with normal gait mechanics and arm swing     Posture: protracted L shoulder girdle in a post-op sling        Passive Range of Motion:   Shoulder Left Right  12/15/23  L PROM 2/14/24  L PROM   Flexion 120 nt 158 170   Abduction nt nt 120 125   ER at 45 12 nt 40    ER at 90 nt nt NT 60   IR 50 (to abdomen) nt 60 70      Active Range of Motion:   Shoulder Left Right 12/15/23  L AROM  2/14/24  L AROM   Flexion nt nt 105 140   Abduction nt t 93 110   ER at 0 nt nt NT NT   ER at 45 nt nt 40 45   IR nt nt 55 60      Upper Extremity Strength    (L) UE (R) UE 12/15/23  L MMT 2/14/24  L MMT   Shoulder flexion: nt nt 2+ 3+    Shoulder Abduction: nt nt 2+ w/ arm at side 4-   Shoulder ER nt nt 3+ 4+   Shoulder IR nt nt 3+ 5-   Lower Trap nt nt NT NT   Middle Trap nt nt NT NT   Rhomboids nt nt NT NT        Intake Outcome Measure for FOTO Shoulder Survey     Therapist reviewed FOTO scores for Rosaura Mitchell on 12/14/2023.   FOTO report - see Media section or FOTO account episode details.     Intake Score: 59%     Treatment     Bolded activities performed today:    Rosaura received the treatments listed below:      therapeutic exercises to develop strength, endurance, ROM, flexibility, posture, and core stabilization for 30 minutes including:  UBE 3' fwd/3' bcwd lvl 2  Tricep extension 3x10 RTB  resisted shoulder IR @ 90 deg abduction, green band  prone on SB Y's/T's x30 ea  biceps curl and OH shoulder press x20, 2#  static weight hold (1#) for 2 laps    Not Performed:  Resisted shoulder flexion/scaption/abduction (standing) x10 ea YTB  Resisted Elbow extension x30 BlackTB  Resisted shoulder IR (towel roll under arm) 10x3 RTB  Resisted shoulder ER (towel roll under arm) 10x3 RTB  Rows 3x10 BlueTB  B Shoulder extension 3 x10 with BlackTB    manual therapy techniques: Joint mobilizations, Soft tissue Mobilization, and ROM were applied to the: L shoulder for 10 minutes, including:    Scapular mobilization medial/lateral glides, superior/inferior glides      neuromuscular re-education activities to improve: Coordination, Kinesthetic, and Posture for 11 minutes. The following activities  "were included:  Scapular depression (side-lying) 20x3" L  Standing L shoulder flexion (punch) 10x3 with red TB   Standing L shoulder adduction 10x3 with red TB  Shoulder flexion (seated) x20 , #4 therabar   Side lying L shoulder ER x 30 with 2#      Patient Education and Home Exercises       Education provided:   - HEP  - Precautions, wearing sling    Written Home Exercises Provided: Patient instructed to cont prior HEP. Exercises were reviewed and Rosaura was able to demonstrate them prior to the end of the session.  Rosaura demonstrated good  understanding of the education provided. See Electronic Medical Record under Patient Instructions for exercises provided during therapy sessions    Assessment     Rosaura presents to treatment reporting less pain and the absence of a sharp pinching sensation with overhead movements. She exhibits improved L shoulder active and passive ROM as well as L UE strength.  Pt is progressing well and expect discharge form outpatient Physical Therapy following her last scheduled Physical Therapy appointment.      Rosaura Is progressing well towards her goals.   Patient prognosis is Good.     Patient will continue to benefit from skilled outpatient physical therapy to address the deficits listed in the problem list box on initial evaluation, provide pt/family education and to maximize pt's level of independence in the home and community environment.     Patient's spiritual, cultural and educational needs considered and pt agreeable to plan of care and goals.     Anticipated Barriers for therapy: compliance     Goals:  Short Term Goals: 6 weeks   Pt will be instructed in an exercise program to address functional deficits related to L UE Sx.  MET 2/14/2024  Improve L shoulder ROM to >/= 120 degrees of flexion, 95 degrees of abduction, 35 degrees of external rotation and 25 degrees of internal rotation.  MET 2/14/2024  L shoulder girdle strength to >/= 2/5 into flexion, abduction, internal and " external rotation -MET  Pt will report a decrease in L shoulder pain to </= 4/10  MET 2/14/2024     Long Term Goals: 15 weeks   Pt will be independent in a HEP to assist in managing their L UE Sx. Progressing 12/15/2023  Improve L shoulder ROM to >/= 150 degrees of flexion, 125 degrees of abduction, 50 degrees of external rotation and 35 degrees of internal rotation.  MET 2/14/2024  L shoulder girdle strength to >/= 2/5 into flexion, abduction, internal and external rotation  Pt will report that she is able to dress, bath and groom with greater ease  MET 2/14/2024  Pt will report improved functional ability through an improved score on the FOTO shoulder survey  MET 2/14/2024    Plan   Extend Physical Therapy Plan of Care to 4/11/2024  Continue to progress per PT POC.     Stevan Sweet, PT

## 2024-02-20 ENCOUNTER — CLINICAL SUPPORT (OUTPATIENT)
Dept: REHABILITATION | Facility: HOSPITAL | Age: 49
End: 2024-02-20
Payer: MEDICAID

## 2024-02-20 DIAGNOSIS — M25.512 CHRONIC LEFT SHOULDER PAIN: Primary | ICD-10-CM

## 2024-02-20 DIAGNOSIS — G89.29 CHRONIC LEFT SHOULDER PAIN: Primary | ICD-10-CM

## 2024-02-20 DIAGNOSIS — M25.612 STIFFNESS OF LEFT SHOULDER JOINT: ICD-10-CM

## 2024-02-20 PROCEDURE — 97110 THERAPEUTIC EXERCISES: CPT | Mod: PO

## 2024-02-20 NOTE — PROGRESS NOTES
[facility]   Physical Therapy Treatment Note      Name: Rosaura Mitchell  Clinic Number: [id number]    Therapy Diagnosis:   Encounter Diagnoses   Name Primary?    Chronic left shoulder pain Yes    Stiffness of left shoulder joint              Physician: Ariel Valladares MD    Visit Date: 2/20/2024    Physician Orders: PT Eval and Treat   Medical Diagnosis from Referral: Status post total shoulder arthroplasty, left  Evaluation Date: 10/11/2023  Authorization Period Expiration: 10/09/2024  Plan of Care Expiration: 2/11/2023 extended to 3/11/2024  Progress Note Completed : 3/11/2024  Next Progress Note Due: 2/18/2024  Date of Surgery: 9/28/2023  Visit # / Visits authorized: 10/ 20   FOTO: 3/ 3     Precautions: Standard, Diabetes, and Hodgkin lymphoma and L total shoulder 9/28/2023       Time In: 10:04 am  Time Out: 10:53 am  Total Billable Time:  23 minutes  (2 TE medicaid)           PTA Visit #: 1/5     Per MD f/u 11/10/23:  - Tylenol and Ibuprofen as needed for pain. Perhaps take before PT/OT sessions  - Advance ROM to full elevation over the next 6 weeks. Continue to limit ER to neutral to protect subscapularis repair. May add AAROM.    Subjective     Patient reports: her shoulder is feeling better and she feels that she will be able to continue with her home program independently.  She was compliant with home exercise program.  Response to previous treatment: sore  Functional change: ongoing    Pain: 0/10  Location: left shoulder      Objective      Objective Measures updated at progress report unless specified.          Treatment     Bolded activities performed today:    Rosaura received the treatments listed below:      therapeutic exercises to develop strength, endurance, ROM, flexibility, posture, and core stabilization for 30 minutes including:  UBE 3' fwd/3' bcwd lvl 2  Tricep extension 3x10 RTB  resisted shoulder IR @ 90 deg abduction, green band  prone on SB Y's/T's x30  "ea  biceps curl and OH shoulder press x20, 2#  static weight hold (1#) for 2 laps    Not Performed:  Resisted shoulder flexion/scaption/abduction (standing) x10 ea YTB  Resisted Elbow extension x30 BlackTB  Resisted shoulder IR (towel roll under arm) 10x3 RTB  Resisted shoulder ER (towel roll under arm) 10x3 RTB  Rows 3x10 BlueTB  B Shoulder extension 3 x10 with BlackTB    manual therapy techniques: Joint mobilizations, Soft tissue Mobilization, and ROM were applied to the: L shoulder for 00 minutes, including:    Scapular mobilization medial/lateral glides, superior/inferior glides      neuromuscular re-education activities to improve: Coordination, Kinesthetic, and Posture for 16 minutes. The following activities were included:  Scapular depression (side-lying) 20x3" L  Standing L shoulder flexion (punch) 10x3 with red TB   Standing L shoulder adduction 10x3 with red TB  Shoulder flexion (seated) x20 , #4 therabar   Side lying L shoulder ER x 30 with 2#      Patient Education and Home Exercises       Education provided:   - HEP  - Precautions, wearing sling    Written Home Exercises Provided: Patient instructed to cont prior HEP. Exercises were reviewed and Rosaura was able to demonstrate them prior to the end of the session.  Rosaura demonstrated good  understanding of the education provided. See Electronic Medical Record under Patient Instructions for exercises provided during therapy sessions    Assessment     Rosaura presents to treatment reporting that she is doing well with her ADL's and work related activities.  She has no pain at present and little discomfort on a daily bases. She reports that she feels that she will be able to continue with her home program independently to maintain/progress the ROM and strength gains she has made in Physical Therapy.  She encouraged to contact me if she has any problems with her home exercises.  Ms. Mitchell will be discharged from outpatient Physical Therapy today to a home " exercise program.      Rosaura Is progressing well towards her goals.   Patient prognosis is Good.       Patient's spiritual, cultural and educational needs considered and pt agreeable to plan of care and goals.     Anticipated Barriers for therapy: compliance     Goals:  Short Term Goals: 6 weeks   Pt will be instructed in an exercise program to address functional deficits related to L UE Sx.  MET 2/14/2024  Improve L shoulder ROM to >/= 120 degrees of flexion, 95 degrees of abduction, 35 degrees of external rotation and 25 degrees of internal rotation.  MET 2/14/2024  L shoulder girdle strength to >/= 2/5 into flexion, abduction, internal and external rotation -MET  Pt will report a decrease in L shoulder pain to </= 4/10  MET 2/14/2024     Long Term Goals: 15 weeks   Pt will be independent in a HEP to assist in managing their L UE Sx. MET 2/20/2024   Improve L shoulder ROM to >/= 150 degrees of flexion, 125 degrees of abduction, 50 degrees of external rotation and 35 degrees of internal rotation.  MET 2/14/2024  L shoulder girdle strength to >/= 2/5 into flexion, abduction, internal and external rotation  MET 2/20/2024  Pt will report that she is able to dress, bath and groom with greater ease  MET 2/14/2024  Pt will report improved functional ability through an improved score on the FOTO shoulder survey  MET 2/14/2024    Plan     Ms. Mitchell will be discharged from outpatient Physical Therapy today to a home exercise program.    Stevan Sweet, PT

## 2024-03-14 DIAGNOSIS — M25.512 LEFT SHOULDER PAIN, UNSPECIFIED CHRONICITY: Primary | ICD-10-CM

## 2024-03-20 ENCOUNTER — HOSPITAL ENCOUNTER (OUTPATIENT)
Dept: RADIOLOGY | Facility: HOSPITAL | Age: 49
Discharge: HOME | End: 2024-03-20
Attending: ORTHOPAEDIC SURGERY
Payer: MEDICAID

## 2024-03-20 ENCOUNTER — OFFICE VISIT (OUTPATIENT)
Dept: ORTHOPEDICS | Facility: CLINIC | Age: 49
End: 2024-03-20
Payer: MEDICAID

## 2024-03-20 VITALS
DIASTOLIC BLOOD PRESSURE: 76 MMHG | HEIGHT: 65 IN | WEIGHT: 231 LBS | HEART RATE: 90 BPM | SYSTOLIC BLOOD PRESSURE: 114 MMHG | BODY MASS INDEX: 38.49 KG/M2

## 2024-03-20 DIAGNOSIS — M25.512 LEFT SHOULDER PAIN, UNSPECIFIED CHRONICITY: Primary | ICD-10-CM

## 2024-03-20 DIAGNOSIS — M25.512 LEFT SHOULDER PAIN, UNSPECIFIED CHRONICITY: ICD-10-CM

## 2024-03-20 DIAGNOSIS — Z96.612 STATUS POST TOTAL SHOULDER ARTHROPLASTY, LEFT: Primary | ICD-10-CM

## 2024-03-20 PROCEDURE — 3008F BODY MASS INDEX DOCD: CPT | Mod: CPTII,,, | Performed by: ORTHOPAEDIC SURGERY

## 2024-03-20 PROCEDURE — 99213 OFFICE O/P EST LOW 20 MIN: CPT | Mod: S$PBB,,, | Performed by: ORTHOPAEDIC SURGERY

## 2024-03-20 PROCEDURE — 1159F MED LIST DOCD IN RCRD: CPT | Mod: CPTII,,, | Performed by: ORTHOPAEDIC SURGERY

## 2024-03-20 PROCEDURE — 73030 X-RAY EXAM OF SHOULDER: CPT | Mod: TC,PN,LT

## 2024-03-20 PROCEDURE — 3074F SYST BP LT 130 MM HG: CPT | Mod: CPTII,,, | Performed by: ORTHOPAEDIC SURGERY

## 2024-03-20 PROCEDURE — 1160F RVW MEDS BY RX/DR IN RCRD: CPT | Mod: CPTII,,, | Performed by: ORTHOPAEDIC SURGERY

## 2024-03-20 PROCEDURE — 3078F DIAST BP <80 MM HG: CPT | Mod: CPTII,,, | Performed by: ORTHOPAEDIC SURGERY

## 2024-03-20 PROCEDURE — 99999 PR PBB SHADOW E&M-EST. PATIENT-LVL IV: CPT | Mod: PBBFAC,,, | Performed by: ORTHOPAEDIC SURGERY

## 2024-03-20 PROCEDURE — 4010F ACE/ARB THERAPY RXD/TAKEN: CPT | Mod: CPTII,,, | Performed by: ORTHOPAEDIC SURGERY

## 2024-03-20 PROCEDURE — 73030 X-RAY EXAM OF SHOULDER: CPT | Mod: 26,LT,, | Performed by: RADIOLOGY

## 2024-03-20 PROCEDURE — 99214 OFFICE O/P EST MOD 30 MIN: CPT | Mod: PBBFAC,25,PN | Performed by: ORTHOPAEDIC SURGERY

## 2024-03-20 NOTE — PROGRESS NOTES
Patient ID:   Rosaura Mitchell is a 49 y.o. female.    Chief Complaint:   Six months status post left total shoulder arthroplasty    HPI:   The patient is returning today for evaluation of her left shoulder.  She reports a 0/10 pain level.  She reports discomfort at her in range of motion.    Medications:    Current Outpatient Medications:     ACCU-CHEK FASTCLIX Misc, , Disp: , Rfl:     albuterol (PROVENTIL/VENTOLIN HFA) 90 mcg/actuation inhaler, INHALE 2 PUFFS FOUR TIMES DAILY AS NEEDED FOR SHORTNESS OF BREATH, Disp: , Rfl:     amitriptyline (ELAVIL) 50 MG tablet, Take 1 tablet by mouth., Disp: , Rfl:     atorvastatin (LIPITOR) 10 MG tablet, Take 10 mg by mouth nightly., Disp: , Rfl:     BINAXNOW COVID-19 AG SELF TEST Kit, TEST AS DIRECTED TODAY, Disp: , Rfl:     cholecalciferol, vitamin D3, 125 mcg (5,000 unit) Tab, Take 5,000 Units by mouth once daily., Disp: , Rfl:     citalopram (CELEXA) 40 MG tablet, Take 40 mg by mouth., Disp: , Rfl:     clotrimazole-betamethasone 1-0.05% (LOTRISONE) cream, APPLY CREAM TOPICALLY TO AFFECTED AREA (CHEST AND ABDOMEN) 1 TO 2 TIMES DAILY FOR 10 DAYS, Disp: , Rfl:     famotidine (PEPCID) 20 MG tablet, Take by mouth., Disp: , Rfl:     hydrOXYzine (ATARAX) 50 MG tablet, , Disp: , Rfl:     LINZESS 290 mcg Cap capsule, Take 290 mcg by mouth every morning., Disp: , Rfl:     losartan (COZAAR) 50 MG tablet, Take 50 mg by mouth., Disp: , Rfl:     NURTEC 75 mg odt, Take by mouth., Disp: , Rfl:     nystatin (MYCOSTATIN) powder, APPLY POWDER TOPICALLY TO AFFECTED AREA(TO INFRAMAMMARY BREASTS AND ABDOMINAL FOLD(S)) TWICE DAILY FOR 14 DAYS, Disp: , Rfl:     ondansetron (ZOFRAN) 4 MG tablet, Take 1 tablet (4 mg total) by mouth every 6 (six) hours as needed for Nausea., Disp: 28 tablet, Rfl: 0    ONETOUCH ULTRA BLUE TEST STRIP Strp, , Disp: , Rfl:     ONETOUCH ULTRA2 kit, , Disp: , Rfl:     pantoprazole (PROTONIX) 40 MG tablet, Take 40 mg by mouth., Disp: , Rfl:     topiramate (TOPAMAX) 100 MG  "tablet, Take 100 mg by mouth 2 (two) times daily., Disp: , Rfl:     XARELTO 20 mg Tab, , Disp: , Rfl:     ZTLIDO 1.8 % PtMd, Apply 1 patch topically daily as needed., Disp: , Rfl:     doxepin (SINEQUAN) 10 MG capsule, TAKE 1 CAPSULE BY MOUTH AT BEDTIME AS NEEDED FOR SLEEP OR ITCHINESS, Disp: , Rfl:     enoxaparin (LOVENOX) 120 mg/0.8 mL Syrg, , Disp: , Rfl:     oxyCODONE-acetaminophen (PERCOCET) 5-325 mg per tablet, Take 1 tablet by mouth every 6 (six) hours as needed for Pain. (Patient not taking: Reported on 3/20/2024), Disp: 20 tablet, Rfl: 0  No current facility-administered medications for this visit.    Facility-Administered Medications Ordered in Other Visits:     cefazolin (ANCEF) 2 gram in dextrose 5% 50 mL IVPB (premix), 2 g, Intravenous, 30 Min Pre-Op, Ariel Valladares MD, 2 g at 01/16/20 1613    Allergies:  Review of patient's allergies indicates:   Allergen Reactions    Naproxen      Hair falls out       Vitals:  /76   Pulse 90   Ht 5' 5" (1.651 m)   Wt 104.8 kg (231 lb)   BMI 38.44 kg/m²     Physical Examination:  Ortho Exam   Left shoulder exam:  Active elevation 160, external rotation at the side 20, internal rotation to the lumbar spine.  Rotator cuff strength is normal.    I have ordered and independently reviewed the following imaging studies performed at [facility] today    X-Ray Shoulder Trauma 3 view Left  EXAMINATION:  XR SHOULDER TRAUMA 3 VIEW LEFT    CLINICAL HISTORY:  Pain in left shoulder    FINDINGS:  Shoulder trauma three views left.    There is a left shoulder prosthesis in good alignment and no complication.  There is a central line.  No fracture dislocation bone destruction seen.    Electronically signed by: Santos Carter MD  Date:    03/20/2024  Time:    11:25    Assessment:  1. Status post total shoulder arthroplasty, left      Plan:  The patient will continue to advance her activity as tolerated.  I would like to see her at the 1 year yesi with a new x-ray of the left " shoulder.       No follow-ups on file.

## 2024-05-07 ENCOUNTER — PATIENT MESSAGE (OUTPATIENT)
Dept: ORTHOPEDICS | Facility: CLINIC | Age: 49
End: 2024-05-07
Payer: MEDICAID

## 2024-05-08 ENCOUNTER — HOSPITAL ENCOUNTER (OUTPATIENT)
Dept: RADIOLOGY | Facility: HOSPITAL | Age: 49
Discharge: HOME | End: 2024-05-08
Attending: ORTHOPAEDIC SURGERY
Payer: MEDICAID

## 2024-05-08 ENCOUNTER — OFFICE VISIT (OUTPATIENT)
Dept: ORTHOPEDICS | Facility: CLINIC | Age: 49
End: 2024-05-08
Payer: MEDICAID

## 2024-05-08 VITALS — WEIGHT: 231.06 LBS | BODY MASS INDEX: 38.5 KG/M2 | HEIGHT: 65 IN

## 2024-05-08 DIAGNOSIS — Z96.612 STATUS POST TOTAL SHOULDER ARTHROPLASTY, LEFT: Primary | ICD-10-CM

## 2024-05-08 DIAGNOSIS — M25.512 ACUTE PAIN OF LEFT SHOULDER: ICD-10-CM

## 2024-05-08 DIAGNOSIS — M25.512 LEFT SHOULDER PAIN, UNSPECIFIED CHRONICITY: ICD-10-CM

## 2024-05-08 PROCEDURE — 4010F ACE/ARB THERAPY RXD/TAKEN: CPT | Mod: CPTII,,, | Performed by: ORTHOPAEDIC SURGERY

## 2024-05-08 PROCEDURE — 73030 X-RAY EXAM OF SHOULDER: CPT | Mod: 26,LT,, | Performed by: RADIOLOGY

## 2024-05-08 PROCEDURE — 1159F MED LIST DOCD IN RCRD: CPT | Mod: CPTII,,, | Performed by: ORTHOPAEDIC SURGERY

## 2024-05-08 PROCEDURE — 1160F RVW MEDS BY RX/DR IN RCRD: CPT | Mod: CPTII,,, | Performed by: ORTHOPAEDIC SURGERY

## 2024-05-08 PROCEDURE — 73030 X-RAY EXAM OF SHOULDER: CPT | Mod: TC,PN,LT

## 2024-05-08 PROCEDURE — 3008F BODY MASS INDEX DOCD: CPT | Mod: CPTII,,, | Performed by: ORTHOPAEDIC SURGERY

## 2024-05-08 PROCEDURE — 99999 PR PBB SHADOW E&M-EST. PATIENT-LVL IV: CPT | Mod: PBBFAC,,, | Performed by: ORTHOPAEDIC SURGERY

## 2024-05-08 PROCEDURE — 99214 OFFICE O/P EST MOD 30 MIN: CPT | Mod: PBBFAC,25,PN | Performed by: ORTHOPAEDIC SURGERY

## 2024-05-08 PROCEDURE — 99214 OFFICE O/P EST MOD 30 MIN: CPT | Mod: S$PBB,,, | Performed by: ORTHOPAEDIC SURGERY

## 2024-05-08 NOTE — LETTER
May 8, 2024      [facility]  [address]  [address]  [place] [address]  Phone: [phone]  Fax: [phone]       Patient: Rosaura Mitchell   YOB: 1975  Date of Visit: 05/08/2024    To Whom It May Concern:    Carter Mitchell  was at [facility] on 05/08/2024. I have advised no work duty until MRI of the left shoulder is completed and she returns for follow-up. If you have any questions or concerns, or if I can be of further assistance, please do not hesitate to contact me.    Sincerely,    Ariel Valladares MD, FAAOS    Residency   [facility] Department of Orthopedic Surgery  Assistant Orthopedic Surgeon, [employer] [place] [employer]  Head Team Physician, [facility] [employer]  [place], [place]

## 2024-05-08 NOTE — PROGRESS NOTES
Patient ID:   Rosaura Mitchell is a 49 y.o. female.    Chief Complaint:   7m 10d s/p L TSA    HPI:   The patient is returning today for evaluation of her left shoulder.  Unfortunately, she has sustained 2 recent falls.  She reports sustaining a fall at the recent [place].  She reports having a fall yesterday at work.  She slipped on a mat that apparently had some water underneath it.  She reports landing on her left shoulder.  She states that at 1st she did not have pain but later felt pain.  She is presenting today with an 8/10 pain level.  She describes the pain over the lateral aspect of her left arm.  Pain is worse when she attempts to abduct or raise the extremity.    Medications:    Current Outpatient Medications:     ACCU-CHEK FASTCLIX Misc, , Disp: , Rfl:     albuterol (PROVENTIL/VENTOLIN HFA) 90 mcg/actuation inhaler, INHALE 2 PUFFS FOUR TIMES DAILY AS NEEDED FOR SHORTNESS OF BREATH, Disp: , Rfl:     amitriptyline (ELAVIL) 50 MG tablet, Take 1 tablet by mouth., Disp: , Rfl:     atorvastatin (LIPITOR) 10 MG tablet, Take 10 mg by mouth nightly., Disp: , Rfl:     BINAXNOW COVID-19 AG SELF TEST Kit, TEST AS DIRECTED TODAY, Disp: , Rfl:     cholecalciferol, vitamin D3, 125 mcg (5,000 unit) Tab, Take 5,000 Units by mouth once daily., Disp: , Rfl:     citalopram (CELEXA) 40 MG tablet, Take 40 mg by mouth., Disp: , Rfl:     clotrimazole-betamethasone 1-0.05% (LOTRISONE) cream, APPLY CREAM TOPICALLY TO AFFECTED AREA (CHEST AND ABDOMEN) 1 TO 2 TIMES DAILY FOR 10 DAYS, Disp: , Rfl:     doxepin (SINEQUAN) 10 MG capsule, TAKE 1 CAPSULE BY MOUTH AT BEDTIME AS NEEDED FOR SLEEP OR ITCHINESS, Disp: , Rfl:     enoxaparin (LOVENOX) 120 mg/0.8 mL Syrg, , Disp: , Rfl:     famotidine (PEPCID) 20 MG tablet, Take by mouth., Disp: , Rfl:     hydrOXYzine (ATARAX) 50 MG tablet, , Disp: , Rfl:     LINZESS 290 mcg Cap capsule, Take 290 mcg by mouth every morning., Disp: , Rfl:     losartan (COZAAR) 50 MG tablet, Take 50 mg  by mouth., Disp: , Rfl:     NURTEC 75 mg odt, Take by mouth., Disp: , Rfl:     nystatin (MYCOSTATIN) powder, APPLY POWDER TOPICALLY TO AFFECTED AREA(TO INFRAMAMMARY BREASTS AND ABDOMINAL FOLD(S)) TWICE DAILY FOR 14 DAYS, Disp: , Rfl:     ondansetron (ZOFRAN) 4 MG tablet, Take 1 tablet (4 mg total) by mouth every 6 (six) hours as needed for Nausea., Disp: 28 tablet, Rfl: 0    ONETOUCH ULTRA BLUE TEST STRIP Strp, , Disp: , Rfl:     ONETOUCH ULTRA2 kit, , Disp: , Rfl:     oxyCODONE-acetaminophen (PERCOCET) 5-325 mg per tablet, Take 1 tablet by mouth every 6 (six) hours as needed for Pain., Disp: 20 tablet, Rfl: 0    pantoprazole (PROTONIX) 40 MG tablet, Take 40 mg by mouth., Disp: , Rfl:     topiramate (TOPAMAX) 100 MG tablet, Take 100 mg by mouth 2 (two) times daily., Disp: , Rfl:     XARELTO 20 mg Tab, , Disp: , Rfl:     ZTLIDO 1.8 % PtMd, Apply 1 patch topically daily as needed., Disp: , Rfl:   No current facility-administered medications for this visit.    Facility-Administered Medications Ordered in Other Visits:     cefazolin (ANCEF) 2 gram in dextrose 5% 50 mL IVPB (premix), 2 g, Intravenous, 30 Min Pre-Op, Ariel Valladares MD, 2 g at 01/16/20 1613    Allergies:  Review of patient's allergies indicates:   Allergen Reactions    Naproxen      Hair falls out       Past Medical History:  Past Medical History:   Diagnosis Date    Diabetes mellitus     Hodgkin lymphoma     dx- Aug 15, 2018, chemo 9/25/18-2/26/2019, due to start radiation    Hyperlipidemia     Hypertension     Pulmonary embolism     dx June 26, 2018, on lovenox        Past Surgical History:  Past Surgical History:   Procedure Laterality Date    ARTHROPLASTY OF SHOULDER Left 9/28/2023    Procedure: ARTHROPLASTY, SHOULDER;  Surgeon: Ariel Valladares MD;  Location: [facility];  Service: Orthopedics;  Laterality: Left;  Charles beach chair positioner willie notified cc    ARTHROSCOPIC DEBRIDEMENT OF SHOULDER Left 1/16/2020    Procedure: DEBRIDEMENT,  "SHOULDER, ARTHROSCOPIC EXTENSIVE ARTHROSCOPIC RELEASE CAPSULAR RELEASE;  Surgeon: Ariel Valladares MD;  Location: [facility];  Service: Orthopedics;  Laterality: Left;  LEFT SHOULDER EXTENSIVE ARTHROSCOPIC DEBRIDEMENT   GETA + INTERSCALENE  VIDEO/confirmed 1/15/2020 1230 KB Christofer    BREAST BIOPSY Right     stereo rt 2018    CHOLECYSTECTOMY  1998    UMBILICAL HERNIA REPAIR  2001    mesh placed       Social History:  Social History     Occupational History    Not on file   Tobacco Use    Smoking status: Never    Smokeless tobacco: Never   Substance and Sexual Activity    Alcohol use: Yes     Comment: Socially    Drug use: No    Sexual activity: Not Currently     Birth control/protection: Abstinence       Family History:  Family History   Problem Relation Name Age of Onset    Breast cancer Mother      Breast cancer Maternal Cousin      Breast cancer Maternal Aunt      Colon cancer Neg Hx      Ovarian cancer Neg Hx          ROS:  Review of Systems   Musculoskeletal:  Positive for falls, joint pain and myalgias.   All other systems reviewed and are negative.      Vitals:  Ht 5' 5" (1.651 m)   Wt 104.8 kg (231 lb 0.7 oz)   BMI 38.45 kg/m²     Physical Examination:  Comprehensive Orthopaedic Musculoskeletal Exam    General      Constitutional: appears stated age, moderately obese, well-developed and well-nourished    Scleral icterus: no    Labored breathing: no    Psychiatric: normal mood and affect and no acute distress    Neurological: alert and oriented x3    Skin: intact    Lymphadenopathy: none     Ortho Exam   Left shoulder exam:  No obvious deformity is present.  Well-healed surgical scars present.  Nontender over the AC joint.  Mild tenderness over the lateral deltoid.    Range of motion:  Active elevation is to about 100°, passive elevation is 170, external rotation at the side is 10°, internal rotation is to L1.  Rotator cuff strength testin+ out of 5 elevation and external rotation which may be " limited by pain.  5/5 internal rotation strength.  Negative lift-off test.  Negative belly press test.    Imaging:  I have ordered and independently reviewed the following imaging studies performed at [facility] today    Left shoulder x-ray series demonstrates a well-seated, well-aligned total shoulder arthroplasty.  I do not see any dislocation or fracture.    Assessment:  1. Status post total shoulder arthroplasty, left      Plan:  I reviewed today's findings with the patient in detail.  In light of her 2 recent falls and an exam in which she has weakness in her rotator cuff, I have recommended she undergo MRI with metal suppression to evaluate her rotator cuff.  She will return after the MRI scan has been completed.     No follow-ups on file.

## 2024-05-21 ENCOUNTER — HOSPITAL ENCOUNTER (OUTPATIENT)
Dept: RADIOLOGY | Facility: HOSPITAL | Age: 49
Discharge: HOME | End: 2024-05-21
Attending: ORTHOPAEDIC SURGERY
Payer: MEDICAID

## 2024-05-21 DIAGNOSIS — M25.512 ACUTE PAIN OF LEFT SHOULDER: ICD-10-CM

## 2024-05-21 PROCEDURE — 73221 MRI JOINT UPR EXTREM W/O DYE: CPT | Mod: TC,LT

## 2024-05-21 PROCEDURE — 73221 MRI JOINT UPR EXTREM W/O DYE: CPT | Mod: 26,LT,, | Performed by: RADIOLOGY

## 2024-05-22 ENCOUNTER — PATIENT MESSAGE (OUTPATIENT)
Dept: ORTHOPEDICS | Facility: CLINIC | Age: 49
End: 2024-05-22
Payer: MEDICAID

## 2024-05-28 ENCOUNTER — OFFICE VISIT (OUTPATIENT)
Dept: ORTHOPEDICS | Facility: CLINIC | Age: 49
End: 2024-05-28
Payer: MEDICAID

## 2024-05-28 VITALS
HEIGHT: 68 IN | WEIGHT: 239 LBS | SYSTOLIC BLOOD PRESSURE: 140 MMHG | DIASTOLIC BLOOD PRESSURE: 93 MMHG | HEART RATE: 92 BPM | BODY MASS INDEX: 36.22 KG/M2

## 2024-05-28 DIAGNOSIS — Z96.612 STATUS POST TOTAL SHOULDER ARTHROPLASTY, LEFT: Primary | ICD-10-CM

## 2024-05-28 PROCEDURE — 99213 OFFICE O/P EST LOW 20 MIN: CPT | Mod: PBBFAC,PN | Performed by: ORTHOPAEDIC SURGERY

## 2024-05-28 PROCEDURE — 3080F DIAST BP >= 90 MM HG: CPT | Mod: CPTII,,, | Performed by: ORTHOPAEDIC SURGERY

## 2024-05-28 PROCEDURE — 3008F BODY MASS INDEX DOCD: CPT | Mod: CPTII,,, | Performed by: ORTHOPAEDIC SURGERY

## 2024-05-28 PROCEDURE — 99214 OFFICE O/P EST MOD 30 MIN: CPT | Mod: S$PBB,,, | Performed by: ORTHOPAEDIC SURGERY

## 2024-05-28 PROCEDURE — 3077F SYST BP >= 140 MM HG: CPT | Mod: CPTII,,, | Performed by: ORTHOPAEDIC SURGERY

## 2024-05-28 PROCEDURE — 1160F RVW MEDS BY RX/DR IN RCRD: CPT | Mod: CPTII,,, | Performed by: ORTHOPAEDIC SURGERY

## 2024-05-28 PROCEDURE — 99999 PR PBB SHADOW E&M-EST. PATIENT-LVL III: CPT | Mod: PBBFAC,,, | Performed by: ORTHOPAEDIC SURGERY

## 2024-05-28 PROCEDURE — 4010F ACE/ARB THERAPY RXD/TAKEN: CPT | Mod: CPTII,,, | Performed by: ORTHOPAEDIC SURGERY

## 2024-05-28 PROCEDURE — 1159F MED LIST DOCD IN RCRD: CPT | Mod: CPTII,,, | Performed by: ORTHOPAEDIC SURGERY

## 2024-05-28 NOTE — PROGRESS NOTES
Patient ID:   Rosaura Mitchell is a 49 y.o. female.    Chief Complaint:   Follow-up evaluation for left shoulder pain    HPI:   Patient is returning today for evaluation of her left shoulder.  She recently completed MRI scan.  She is doing much better and denies any pain today.    Medications:    Current Outpatient Medications:     ACCU-CHEK FASTCLIX Misc, , Disp: , Rfl:     albuterol (PROVENTIL/VENTOLIN HFA) 90 mcg/actuation inhaler, INHALE 2 PUFFS FOUR TIMES DAILY AS NEEDED FOR SHORTNESS OF BREATH, Disp: , Rfl:     amitriptyline (ELAVIL) 50 MG tablet, Take 1 tablet by mouth., Disp: , Rfl:     atorvastatin (LIPITOR) 10 MG tablet, Take 10 mg by mouth nightly., Disp: , Rfl:     BINAXNOW COVID-19 AG SELF TEST Kit, TEST AS DIRECTED TODAY, Disp: , Rfl:     cholecalciferol, vitamin D3, 125 mcg (5,000 unit) Tab, Take 5,000 Units by mouth once daily., Disp: , Rfl:     citalopram (CELEXA) 40 MG tablet, Take 40 mg by mouth., Disp: , Rfl:     clotrimazole-betamethasone 1-0.05% (LOTRISONE) cream, APPLY CREAM TOPICALLY TO AFFECTED AREA (CHEST AND ABDOMEN) 1 TO 2 TIMES DAILY FOR 10 DAYS, Disp: , Rfl:     doxepin (SINEQUAN) 10 MG capsule, TAKE 1 CAPSULE BY MOUTH AT BEDTIME AS NEEDED FOR SLEEP OR ITCHINESS, Disp: , Rfl:     enoxaparin (LOVENOX) 120 mg/0.8 mL Syrg, , Disp: , Rfl:     famotidine (PEPCID) 20 MG tablet, Take by mouth., Disp: , Rfl:     hydrOXYzine (ATARAX) 50 MG tablet, , Disp: , Rfl:     LINZESS 290 mcg Cap capsule, Take 290 mcg by mouth every morning., Disp: , Rfl:     losartan (COZAAR) 50 MG tablet, Take 50 mg by mouth., Disp: , Rfl:     NURTEC 75 mg odt, Take by mouth., Disp: , Rfl:     nystatin (MYCOSTATIN) powder, APPLY POWDER TOPICALLY TO AFFECTED AREA(TO INFRAMAMMARY BREASTS AND ABDOMINAL FOLD(S)) TWICE DAILY FOR 14 DAYS, Disp: , Rfl:     ondansetron (ZOFRAN) 4 MG tablet, Take 1 tablet (4 mg total) by mouth every 6 (six) hours as needed for Nausea., Disp: 28 tablet, Rfl: 0    ONETOUCH ULTRA BLUE TEST STRIP  Strp, , Disp: , Rfl:     ONETOUCH ULTRA2 kit, , Disp: , Rfl:     oxyCODONE-acetaminophen (PERCOCET) 5-325 mg per tablet, Take 1 tablet by mouth every 6 (six) hours as needed for Pain., Disp: 20 tablet, Rfl: 0    pantoprazole (PROTONIX) 40 MG tablet, Take 40 mg by mouth., Disp: , Rfl:     topiramate (TOPAMAX) 100 MG tablet, Take 100 mg by mouth 2 (two) times daily., Disp: , Rfl:     XARELTO 20 mg Tab, , Disp: , Rfl:     ZTLIDO 1.8 % PtMd, Apply 1 patch topically daily as needed., Disp: , Rfl:   No current facility-administered medications for this visit.    Facility-Administered Medications Ordered in Other Visits:     cefazolin (ANCEF) 2 gram in dextrose 5% 50 mL IVPB (premix), 2 g, Intravenous, 30 Min Pre-Op, Ariel Valladares MD, 2 g at 01/16/20 1613    Allergies:  Review of patient's allergies indicates:   Allergen Reactions    Naproxen      Hair falls out       Past Medical History:  Past Medical History:   Diagnosis Date    Diabetes mellitus     Hodgkin lymphoma     dx- Aug 15, 2018, chemo 9/25/18-2/26/2019, due to start radiation    Hyperlipidemia     Hypertension     Pulmonary embolism     dx June 26, 2018, on lovenox        Past Surgical History:  Past Surgical History:   Procedure Laterality Date    ARTHROPLASTY OF SHOULDER Left 9/28/2023    Procedure: ARTHROPLASTY, SHOULDER;  Surgeon: Ariel Valladares MD;  Location: [facility] OR;  Service: Orthopedics;  Laterality: Left;  Charles beach chair positioner willie notified cc    ARTHROSCOPIC DEBRIDEMENT OF SHOULDER Left 1/16/2020    Procedure: DEBRIDEMENT, SHOULDER, ARTHROSCOPIC EXTENSIVE ARTHROSCOPIC RELEASE CAPSULAR RELEASE;  Surgeon: Ariel Valladares MD;  Location: [facility] OR;  Service: Orthopedics;  Laterality: Left;  LEFT SHOULDER EXTENSIVE ARTHROSCOPIC DEBRIDEMENT   GETA + INTERSCALENE  VIDEO/confirmed 1/15/2020 1230 KB Christofer    BREAST BIOPSY Right     stereo rt 4/2018    CHOLECYSTECTOMY  12/1998    UMBILICAL HERNIA REPAIR  05/2001    mesh placed  "      Social History:  Social History     Occupational History    Not on file   Tobacco Use    Smoking status: Never    Smokeless tobacco: Never   Substance and Sexual Activity    Alcohol use: Yes     Comment: Socially    Drug use: No    Sexual activity: Not Currently     Birth control/protection: Abstinence       Family History:  Family History   Problem Relation Name Age of Onset    Breast cancer Mother      Breast cancer Maternal Cousin      Breast cancer Maternal Aunt      Colon cancer Neg Hx      Ovarian cancer Neg Hx          ROS:  Review of Systems   Musculoskeletal:  Negative for joint pain.   All other systems reviewed and are negative.      Vitals:  BP (!) 140/93   Pulse 92   Ht 5' 8" (1.727 m)   Wt 108.4 kg (238 lb 15.7 oz)   BMI 36.34 kg/m²     Physical Examination:  Comprehensive Orthopaedic Musculoskeletal Exam    General      Constitutional: appears stated age, moderately obese, well-developed and well-nourished    Scleral icterus: no    Labored breathing: no    Psychiatric: normal mood and affect and no acute distress    Neurological: alert and oriented x3    Skin: intact    Lymphadenopathy: none     Ortho Exam   Left shoulder exam:   Range of motion today reveals active elevation 150, external rotation 20.  Rotator cuff strength is 5/5    Imaging:  I have ordered and independently reviewed the following imaging studies performed at [facility] today    MRI Shoulder Without Contrast Left  Narrative: EXAMINATION:  MRI SHOULDER WITHOUT CONTRAST LEFT    CLINICAL HISTORY:  Pain in left shoulderShoulder pain, rotator cuff disorder suspected, xray done;Metal suppression, Hx of L total shoulder replacement; New injury with fall; r/o rotaor cuff tear;    TECHNIQUE:  MRI of right shoulder was performed on a 1.5T magnet utilizing the following sequences: Localizer; axial T2 FS; coronal T2 FS and PD FS; sagittal T1, T2 FS and PD FS.    COMPARISON:  Radiograph 05/08/2024    FINDINGS:  All available techniques " for metal artifact reduction were employed, however significant artifact remains.    Rotator cuff: Muscle bulk of the rotator cuff preserved.  No evidence of muscle edema or rotator cuff tendon retraction.  The distal rotator cuff tendons are not well visualized.    Biceps: Not well visualized    Glenohumeral Joint: Left shoulder hemiarthroplasty associated artifact.  No evidence of periprosthetic fluid collection or adverse local tissue reaction.    Acromioclavicular joint: The AC joint is unremarkable.    Misc: There is no evidence of bursitis.  Impression: Left shoulder hemiarthroplasty present.  No evidence of massive rotator cuff tear, but small to moderate-sized tears are difficult to exclude with certainty.    Electronically signed by: Arcenio Jimenez Jr  Date:    05/26/2024  Time:    08:17    Assessment:  1. Status post total shoulder arthroplasty, left      Plan:  The patient may progress her activity as tolerated.  She will follow up as needed.     No follow-ups on file.

## 2024-06-11 ENCOUNTER — PATIENT MESSAGE (OUTPATIENT)
Dept: ORTHOPEDICS | Facility: CLINIC | Age: 49
End: 2024-06-11
Payer: MEDICAID

## 2024-06-11 DIAGNOSIS — M25.561 RIGHT KNEE PAIN, UNSPECIFIED CHRONICITY: Primary | ICD-10-CM

## 2024-06-12 ENCOUNTER — PATIENT MESSAGE (OUTPATIENT)
Dept: ORTHOPEDICS | Facility: CLINIC | Age: 49
End: 2024-06-12

## 2024-06-12 ENCOUNTER — OFFICE VISIT (OUTPATIENT)
Dept: ORTHOPEDICS | Facility: CLINIC | Age: 49
End: 2024-06-12
Payer: MEDICAID

## 2024-06-12 ENCOUNTER — HOSPITAL ENCOUNTER (OUTPATIENT)
Dept: RADIOLOGY | Facility: HOSPITAL | Age: 49
Discharge: HOME | End: 2024-06-12
Attending: ORTHOPAEDIC SURGERY
Payer: MEDICAID

## 2024-06-12 VITALS — WEIGHT: 233.44 LBS | HEIGHT: 65 IN | BODY MASS INDEX: 38.89 KG/M2

## 2024-06-12 DIAGNOSIS — M25.561 RIGHT KNEE PAIN, UNSPECIFIED CHRONICITY: ICD-10-CM

## 2024-06-12 DIAGNOSIS — M17.11 PRIMARY OSTEOARTHRITIS OF RIGHT KNEE: Primary | ICD-10-CM

## 2024-06-12 PROCEDURE — 4010F ACE/ARB THERAPY RXD/TAKEN: CPT | Mod: CPTII,,, | Performed by: ORTHOPAEDIC SURGERY

## 2024-06-12 PROCEDURE — 1159F MED LIST DOCD IN RCRD: CPT | Mod: CPTII,,, | Performed by: ORTHOPAEDIC SURGERY

## 2024-06-12 PROCEDURE — 20610 DRAIN/INJ JOINT/BURSA W/O US: CPT | Mod: PBBFAC,PN | Performed by: ORTHOPAEDIC SURGERY

## 2024-06-12 PROCEDURE — 73564 X-RAY EXAM KNEE 4 OR MORE: CPT | Mod: TC,FY,RT

## 2024-06-12 PROCEDURE — 1160F RVW MEDS BY RX/DR IN RCRD: CPT | Mod: CPTII,,, | Performed by: ORTHOPAEDIC SURGERY

## 2024-06-12 PROCEDURE — 99213 OFFICE O/P EST LOW 20 MIN: CPT | Mod: PBBFAC,25,PN | Performed by: ORTHOPAEDIC SURGERY

## 2024-06-12 PROCEDURE — 99214 OFFICE O/P EST MOD 30 MIN: CPT | Mod: 25,S$PBB,, | Performed by: ORTHOPAEDIC SURGERY

## 2024-06-12 PROCEDURE — 99999PBSHW PR PBB SHADOW TECHNICAL ONLY FILED TO HB: Mod: PBBFAC,,,

## 2024-06-12 PROCEDURE — 73564 X-RAY EXAM KNEE 4 OR MORE: CPT | Mod: 26,RT,, | Performed by: INTERNAL MEDICINE

## 2024-06-12 PROCEDURE — 99999 PR PBB SHADOW E&M-EST. PATIENT-LVL III: CPT | Mod: PBBFAC,,, | Performed by: ORTHOPAEDIC SURGERY

## 2024-06-12 PROCEDURE — 3008F BODY MASS INDEX DOCD: CPT | Mod: CPTII,,, | Performed by: ORTHOPAEDIC SURGERY

## 2024-06-12 RX ORDER — TRIAMCINOLONE ACETONIDE 40 MG/ML
40 INJECTION, SUSPENSION INTRA-ARTICULAR; INTRAMUSCULAR
Status: DISCONTINUED | OUTPATIENT
Start: 2024-06-12 | End: 2024-06-12 | Stop reason: HOSPADM

## 2024-06-12 RX ADMIN — TRIAMCINOLONE ACETONIDE 40 MG: 40 INJECTION, SUSPENSION INTRA-ARTICULAR; INTRAMUSCULAR at 09:06

## 2024-06-12 NOTE — PROCEDURES
Large Joint Aspiration/Injection: R knee    Date/Time: 6/12/2024 9:15 AM    Performed by: Ariel Valladares MD  Authorized by: Ariel Valladares MD    Consent Done?:  Yes (Verbal)  Indications:  Arthritis  Site marked: the procedure site was marked    Timeout: prior to procedure the correct patient, procedure, and site was verified    Prep: patient was prepped and draped in usual sterile fashion      Local anesthesia used?: Yes    Local anesthetic:  Topical anesthetic and lidocaine 1% without epinephrine  Anesthetic total (ml):  4      Details:  Needle Size:  22 G  Ultrasonic Guidance for needle placement?: No    Approach:  Lateral  Location:  Knee  Site:  R knee  Medications:  40 mg triamcinolone acetonide 40 mg/mL  Patient tolerance:  Patient tolerated the procedure well with no immediate complications     A mixture containing 4 cc of 1% lidocaine and 1 cc of Kenalog (40mg/cc) was injected.

## 2024-06-12 NOTE — PROGRESS NOTES
Patient ID:   Rosaura Mitchell is a 49 y.o. female.    Chief Complaint:   Right knee pain    HPI:   Patient is a 49-year-old female who has steadily had increased pain in her right knee over the last 2-3 weeks.  She denies any known injury to the knee.  She describes most of her pain in the parapatellar region.  The pain is worse with walking and bending.  She denies any current alleviating factors.  She has tried using a brace.    Medications:    Current Outpatient Medications:     ACCU-CHEK FASTCLIX Misc, , Disp: , Rfl:     albuterol (PROVENTIL/VENTOLIN HFA) 90 mcg/actuation inhaler, INHALE 2 PUFFS FOUR TIMES DAILY AS NEEDED FOR SHORTNESS OF BREATH, Disp: , Rfl:     amitriptyline (ELAVIL) 50 MG tablet, Take 1 tablet by mouth., Disp: , Rfl:     atorvastatin (LIPITOR) 10 MG tablet, Take 10 mg by mouth nightly., Disp: , Rfl:     BINAXNOW COVID-19 AG SELF TEST Kit, TEST AS DIRECTED TODAY, Disp: , Rfl:     cholecalciferol, vitamin D3, 125 mcg (5,000 unit) Tab, Take 5,000 Units by mouth once daily., Disp: , Rfl:     citalopram (CELEXA) 40 MG tablet, Take 40 mg by mouth., Disp: , Rfl:     clotrimazole-betamethasone 1-0.05% (LOTRISONE) cream, APPLY CREAM TOPICALLY TO AFFECTED AREA (CHEST AND ABDOMEN) 1 TO 2 TIMES DAILY FOR 10 DAYS, Disp: , Rfl:     doxepin (SINEQUAN) 10 MG capsule, TAKE 1 CAPSULE BY MOUTH AT BEDTIME AS NEEDED FOR SLEEP OR ITCHINESS, Disp: , Rfl:     enoxaparin (LOVENOX) 120 mg/0.8 mL Syrg, , Disp: , Rfl:     famotidine (PEPCID) 20 MG tablet, Take by mouth., Disp: , Rfl:     hydrOXYzine (ATARAX) 50 MG tablet, , Disp: , Rfl:     LINZESS 290 mcg Cap capsule, Take 290 mcg by mouth every morning., Disp: , Rfl:     losartan (COZAAR) 50 MG tablet, Take 50 mg by mouth., Disp: , Rfl:     NURTEC 75 mg odt, Take by mouth., Disp: , Rfl:     nystatin (MYCOSTATIN) powder, APPLY POWDER TOPICALLY TO AFFECTED AREA(TO INFRAMAMMARY BREASTS AND ABDOMINAL FOLD(S)) TWICE DAILY FOR 14 DAYS, Disp: , Rfl:     ondansetron (ZOFRAN)  4 MG tablet, Take 1 tablet (4 mg total) by mouth every 6 (six) hours as needed for Nausea., Disp: 28 tablet, Rfl: 0    ONETOUCH ULTRA BLUE TEST STRIP Strp, , Disp: , Rfl:     ONETOUCH ULTRA2 kit, , Disp: , Rfl:     oxyCODONE-acetaminophen (PERCOCET) 5-325 mg per tablet, Take 1 tablet by mouth every 6 (six) hours as needed for Pain., Disp: 20 tablet, Rfl: 0    pantoprazole (PROTONIX) 40 MG tablet, Take 40 mg by mouth., Disp: , Rfl:     topiramate (TOPAMAX) 100 MG tablet, Take 100 mg by mouth 2 (two) times daily., Disp: , Rfl:     XARELTO 20 mg Tab, , Disp: , Rfl:     ZTLIDO 1.8 % PtMd, Apply 1 patch topically daily as needed., Disp: , Rfl:   No current facility-administered medications for this visit.    Facility-Administered Medications Ordered in Other Visits:     cefazolin (ANCEF) 2 gram in dextrose 5% 50 mL IVPB (premix), 2 g, Intravenous, 30 Min Pre-Op, Ariel Valladares MD, 2 g at 01/16/20 1613    Allergies:  Review of patient's allergies indicates:   Allergen Reactions    Naproxen      Hair falls out       Past Medical History:  Past Medical History:   Diagnosis Date    Diabetes mellitus     Hodgkin lymphoma     dx- Aug 15, 2018, chemo 9/25/18-2/26/2019, due to start radiation    Hyperlipidemia     Hypertension     Pulmonary embolism     dx June 26, 2018, on lovenox        Past Surgical History:  Past Surgical History:   Procedure Laterality Date    ARTHROPLASTY OF SHOULDER Left 9/28/2023    Procedure: ARTHROPLASTY, SHOULDER;  Surgeon: Ariel Valladares MD;  Location: [facility] OR;  Service: Orthopedics;  Laterality: Left;  Charles, beach chair positioner willie notified cc    ARTHROSCOPIC DEBRIDEMENT OF SHOULDER Left 1/16/2020    Procedure: DEBRIDEMENT, SHOULDER, ARTHROSCOPIC EXTENSIVE ARTHROSCOPIC RELEASE CAPSULAR RELEASE;  Surgeon: Ariel Valladares MD;  Location: [facility] OR;  Service: Orthopedics;  Laterality: Left;  LEFT SHOULDER EXTENSIVE ARTHROSCOPIC DEBRIDEMENT   GETA + INTERSCALENE  VIDEO/confirmed  "1/15/2020 1230 KB Christofer    BREAST BIOPSY Right     stereo rt 4/2018    CHOLECYSTECTOMY  12/1998    UMBILICAL HERNIA REPAIR  05/2001    mesh placed       Social History:  Social History     Occupational History    Not on file   Tobacco Use    Smoking status: Never    Smokeless tobacco: Never   Substance and Sexual Activity    Alcohol use: Yes     Comment: Socially    Drug use: No    Sexual activity: Not Currently     Birth control/protection: Abstinence       Family History:  Family History   Problem Relation Name Age of Onset    Breast cancer Mother      Breast cancer Maternal Cousin      Breast cancer Maternal Aunt      Colon cancer Neg Hx      Ovarian cancer Neg Hx          ROS:  Review of Systems   Musculoskeletal:  Positive for arthritis and joint pain.   All other systems reviewed and are negative.      Vitals:  Ht 5' 5" (1.651 m)   Wt 105.9 kg (233 lb 7.5 oz)   BMI 38.85 kg/m²     Physical Examination:  Comprehensive Orthopaedic Musculoskeletal Exam    General      Constitutional: appears stated age, moderately obese, well-developed and well-nourished    Scleral icterus: no    Labored breathing: no    Psychiatric: normal mood and affect and no acute distress    Neurological: alert and oriented x3    Skin: intact    Lymphadenopathy: none     Ortho Exam   There is valgus alignment of the knee.    Tenderness beneath the medial and lateral patellar facets.  Nontender on the lateral joint line.  Tender on the medial joint line.    Range of motion: 0-130   Stable to varus and valgus stress.    Imaging:  I have ordered and independently reviewed the following imaging studies performed at [facility] today    Right knee x-ray series demonstrates evidence of tricompartmental degenerative changes most severe in the patellofemoral compartment. K-L 3    Assessment:  1. Primary osteoarthritis of right knee      Plan:  Discussed the radiographic findings which show significant amount of arthritic change in the right knee.  " Offered a corticosteroid injection.  She wishes to proceed.  She will return as needed.     No follow-ups on file.

## 2024-08-01 ENCOUNTER — HOSPITAL ENCOUNTER (OUTPATIENT)
Dept: RADIOLOGY | Facility: OTHER | Age: 49
Discharge: HOME | End: 2024-08-01
Attending: FAMILY MEDICINE
Payer: MEDICAID

## 2024-08-01 DIAGNOSIS — Z12.31 ENCOUNTER FOR SCREENING MAMMOGRAM FOR BREAST CANCER: ICD-10-CM

## 2024-08-01 PROCEDURE — 77067 SCR MAMMO BI INCL CAD: CPT | Mod: TC

## 2024-09-13 DIAGNOSIS — Z96.612 STATUS POST TOTAL SHOULDER ARTHROPLASTY, LEFT: Primary | ICD-10-CM

## 2024-09-20 ENCOUNTER — HOSPITAL ENCOUNTER (OUTPATIENT)
Dept: RADIOLOGY | Facility: HOSPITAL | Age: 49
Discharge: HOME | End: 2024-09-20
Attending: ORTHOPAEDIC SURGERY
Payer: MEDICAID

## 2024-09-20 ENCOUNTER — OFFICE VISIT (OUTPATIENT)
Dept: ORTHOPEDICS | Facility: CLINIC | Age: 49
End: 2024-09-20
Payer: MEDICAID

## 2024-09-20 VITALS — WEIGHT: 233.44 LBS | HEIGHT: 65 IN | BODY MASS INDEX: 38.89 KG/M2

## 2024-09-20 DIAGNOSIS — Z96.612 STATUS POST TOTAL SHOULDER ARTHROPLASTY, LEFT: Primary | ICD-10-CM

## 2024-09-20 DIAGNOSIS — Z96.612 STATUS POST TOTAL SHOULDER ARTHROPLASTY, LEFT: ICD-10-CM

## 2024-09-20 PROCEDURE — 99999 PR PBB SHADOW E&M-EST. PATIENT-LVL II: CPT | Mod: PBBFAC,,, | Performed by: ORTHOPAEDIC SURGERY

## 2024-09-20 PROCEDURE — 73030 X-RAY EXAM OF SHOULDER: CPT | Mod: TC,PN,LT

## 2024-09-20 PROCEDURE — 3008F BODY MASS INDEX DOCD: CPT | Mod: CPTII,,, | Performed by: ORTHOPAEDIC SURGERY

## 2024-09-20 PROCEDURE — 99214 OFFICE O/P EST MOD 30 MIN: CPT | Mod: S$PBB,,, | Performed by: ORTHOPAEDIC SURGERY

## 2024-09-20 PROCEDURE — 1160F RVW MEDS BY RX/DR IN RCRD: CPT | Mod: CPTII,,, | Performed by: ORTHOPAEDIC SURGERY

## 2024-09-20 PROCEDURE — 4010F ACE/ARB THERAPY RXD/TAKEN: CPT | Mod: CPTII,,, | Performed by: ORTHOPAEDIC SURGERY

## 2024-09-20 PROCEDURE — 99212 OFFICE O/P EST SF 10 MIN: CPT | Mod: PBBFAC,25,PN | Performed by: ORTHOPAEDIC SURGERY

## 2024-09-20 PROCEDURE — 1159F MED LIST DOCD IN RCRD: CPT | Mod: CPTII,,, | Performed by: ORTHOPAEDIC SURGERY

## 2024-09-20 PROCEDURE — 73030 X-RAY EXAM OF SHOULDER: CPT | Mod: 26,LT,, | Performed by: RADIOLOGY

## 2024-09-26 NOTE — PROGRESS NOTES
Patient ID:   Rosaura Mitchell is a 49 y.o. female.    Chief Complaint:   One year follow-up status post left total shoulder arthroplasty    HPI:   The patient is almost a year out from her surgery.  She is doing real well.  Pain level is 0/10.  She has no complaints.    Medications:    Current Outpatient Medications:     ACCU-CHEK FASTCLIX Misc, , Disp: , Rfl:     albuterol (PROVENTIL/VENTOLIN HFA) 90 mcg/actuation inhaler, INHALE 2 PUFFS FOUR TIMES DAILY AS NEEDED FOR SHORTNESS OF BREATH, Disp: , Rfl:     amitriptyline (ELAVIL) 50 MG tablet, Take 1 tablet by mouth., Disp: , Rfl:     atorvastatin (LIPITOR) 10 MG tablet, Take 10 mg by mouth nightly., Disp: , Rfl:     BINAXNOW COVID-19 AG SELF TEST Kit, TEST AS DIRECTED TODAY, Disp: , Rfl:     cholecalciferol, vitamin D3, 125 mcg (5,000 unit) Tab, Take 5,000 Units by mouth once daily., Disp: , Rfl:     citalopram (CELEXA) 40 MG tablet, Take 40 mg by mouth., Disp: , Rfl:     clotrimazole-betamethasone 1-0.05% (LOTRISONE) cream, APPLY CREAM TOPICALLY TO AFFECTED AREA (CHEST AND ABDOMEN) 1 TO 2 TIMES DAILY FOR 10 DAYS, Disp: , Rfl:     doxepin (SINEQUAN) 10 MG capsule, TAKE 1 CAPSULE BY MOUTH AT BEDTIME AS NEEDED FOR SLEEP OR ITCHINESS, Disp: , Rfl:     enoxaparin (LOVENOX) 120 mg/0.8 mL Syrg, , Disp: , Rfl:     famotidine (PEPCID) 20 MG tablet, Take by mouth., Disp: , Rfl:     hydrOXYzine (ATARAX) 50 MG tablet, , Disp: , Rfl:     LINZESS 290 mcg Cap capsule, Take 290 mcg by mouth every morning., Disp: , Rfl:     losartan (COZAAR) 50 MG tablet, Take 50 mg by mouth., Disp: , Rfl:     NURTEC 75 mg odt, Take by mouth., Disp: , Rfl:     nystatin (MYCOSTATIN) powder, APPLY POWDER TOPICALLY TO AFFECTED AREA(TO INFRAMAMMARY BREASTS AND ABDOMINAL FOLD(S)) TWICE DAILY FOR 14 DAYS, Disp: , Rfl:     ondansetron (ZOFRAN) 4 MG tablet, Take 1 tablet (4 mg total) by mouth every 6 (six) hours as needed for Nausea., Disp: 28 tablet, Rfl: 0    ONETOUCH ULTRA BLUE TEST STRIP Strp, , Disp:  , Rfl:     ONETOUCH ULTRA2 kit, , Disp: , Rfl:     oxyCODONE-acetaminophen (PERCOCET) 5-325 mg per tablet, Take 1 tablet by mouth every 6 (six) hours as needed for Pain., Disp: 20 tablet, Rfl: 0    pantoprazole (PROTONIX) 40 MG tablet, Take 40 mg by mouth., Disp: , Rfl:     topiramate (TOPAMAX) 100 MG tablet, Take 100 mg by mouth 2 (two) times daily., Disp: , Rfl:     XARELTO 20 mg Tab, , Disp: , Rfl:     ZTLIDO 1.8 % PtMd, Apply 1 patch topically daily as needed., Disp: , Rfl:   No current facility-administered medications for this visit.    Facility-Administered Medications Ordered in Other Visits:     cefazolin (ANCEF) 2 gram in dextrose 5% 50 mL IVPB (premix), 2 g, Intravenous, 30 Min Pre-Op, Ariel Valladares MD, 2 g at 01/16/20 1613    Allergies:  Review of patient's allergies indicates:   Allergen Reactions    Naproxen      Hair falls out       Past Medical History:  Past Medical History:   Diagnosis Date    Diabetes mellitus     Hodgkin lymphoma     dx- Aug 15, 2018, chemo 9/25/18-2/26/2019, due to start radiation    Hyperlipidemia     Hypertension     Pulmonary embolism     dx June 26, 2018, on lovenox        Past Surgical History:  Past Surgical History:   Procedure Laterality Date    ARTHROPLASTY OF SHOULDER Left 9/28/2023    Procedure: ARTHROPLASTY, SHOULDER;  Surgeon: Ariel Valladares MD;  Location: [facility] OR;  Service: Orthopedics;  Laterality: Left;  Charles, beach chair positioner willie notified cc    ARTHROSCOPIC DEBRIDEMENT OF SHOULDER Left 1/16/2020    Procedure: DEBRIDEMENT, SHOULDER, ARTHROSCOPIC EXTENSIVE ARTHROSCOPIC RELEASE CAPSULAR RELEASE;  Surgeon: Ariel Valladares MD;  Location: [facility] OR;  Service: Orthopedics;  Laterality: Left;  LEFT SHOULDER EXTENSIVE ARTHROSCOPIC DEBRIDEMENT   GETA + INTERSCALENE  VIDEO/confirmed 1/15/2020 1230 KB Christofer    BREAST BIOPSY Right     stereo rt 4/2018    CHOLECYSTECTOMY  12/1998    UMBILICAL HERNIA REPAIR  05/2001    mesh placed       Social  "History:  Social History     Occupational History    Not on file   Tobacco Use    Smoking status: Never    Smokeless tobacco: Never   Substance and Sexual Activity    Alcohol use: Yes     Comment: Socially    Drug use: No    Sexual activity: Not Currently     Birth control/protection: Abstinence       Family History:  Family History   Problem Relation Name Age of Onset    Breast cancer Mother      Breast cancer Maternal Cousin      Breast cancer Maternal Aunt      Colon cancer Neg Hx      Ovarian cancer Neg Hx          ROS:  Review of Systems   Musculoskeletal:  Negative for joint pain and stiffness.   All other systems reviewed and are negative.      Vitals:  Ht 5' 5" (1.651 m)   Wt 105.9 kg (233 lb 7.5 oz)   LMP  (LMP Unknown)   BMI 38.85 kg/m²     Physical Examination:  Comprehensive Orthopaedic Musculoskeletal Exam    General      Constitutional: appears stated age, moderately obese, well-developed and well-nourished    Scleral icterus: no    Labored breathing: no    Psychiatric: normal mood and affect and no acute distress    Neurological: alert and oriented x3    Skin: intact    Lymphadenopathy: none     Ortho Exam   Left shoulder exam:   No cellulitis.    Range of motion today reveals active elevation 170, external rotation 20, internal rotation to T12.    Rotator cuff strength is 5/5 in elevation, external rotation and internal rotation.  Negative belly press exam.    Imaging:  I have ordered and independently reviewed the following imaging studies performed at [facility] today    X-Ray Shoulder Trauma 3 view Left  Narrative: EXAMINATION:  XR SHOULDER TRAUMA 3 VIEW LEFT    CLINICAL HISTORY:  Presence of left artificial shoulder joint    TECHNIQUE:  Three views of the left shoulder were performed.    COMPARISON  05/08/2024    FINDINGS:  Prior left shoulder arthroplasty projecting in similar positioning and alignment.  No evidence for interval loosening or failure.  No acute periprosthetic fracture.  AC joint " is maintained.  Impression: As above.    Electronically signed by: Dougie Pickard  Date:    09/20/2024  Time:    10:51    Assessment:  1. Status post total shoulder arthroplasty, left      Plan:  Patient will continue to progress her activity as tolerated.  I would like to have her follow up in another year with a new x-ray of the left shoulder.     No follow-ups on file.

## 2025-05-01 ENCOUNTER — OFFICE VISIT (OUTPATIENT)
Dept: OBSTETRICS AND GYNECOLOGY | Facility: CLINIC | Age: 50
End: 2025-05-01
Payer: MEDICAID

## 2025-05-01 VITALS
BODY MASS INDEX: 35.87 KG/M2 | DIASTOLIC BLOOD PRESSURE: 64 MMHG | WEIGHT: 215.56 LBS | SYSTOLIC BLOOD PRESSURE: 103 MMHG

## 2025-05-01 DIAGNOSIS — Z12.31 ENCOUNTER FOR SCREENING MAMMOGRAM FOR MALIGNANT NEOPLASM OF BREAST: ICD-10-CM

## 2025-05-01 DIAGNOSIS — Z01.419 WELL WOMAN EXAM WITH ROUTINE GYNECOLOGICAL EXAM: Primary | ICD-10-CM

## 2025-05-01 DIAGNOSIS — Z12.4 ROUTINE CERVICAL SMEAR: ICD-10-CM

## 2025-05-01 PROCEDURE — 99214 OFFICE O/P EST MOD 30 MIN: CPT | Mod: PBBFAC,PO | Performed by: OBSTETRICS & GYNECOLOGY

## 2025-05-01 PROCEDURE — 99999 PR PBB SHADOW E&M-EST. PATIENT-LVL IV: CPT | Mod: PBBFAC,,, | Performed by: OBSTETRICS & GYNECOLOGY

## 2025-05-01 RX ORDER — TRIAMCINOLONE ACETONIDE 1 MG/G
OINTMENT TOPICAL 2 TIMES DAILY
Qty: 45 G | Refills: 1 | Status: SHIPPED | OUTPATIENT
Start: 2025-05-01

## 2025-05-01 NOTE — PROGRESS NOTES
OBSTETRIC HISTORY:   OB History          4    Para   4    Term   2            AB        Living   2         SAB        IAB        Ectopic        Multiple        Live Births   2                  COMPREHENSIVE GYN HISTORY:  PAP History: History abnormal Paps.  Infection History: Denies STDs. Denies PID.  Benign History: Denies uterine fibroids. Denies ovarian cysts. Denies endometriosis.   Cancer History: Denies cervical cancer. Denies uterine cancer or hyperplasia. Denies ovarian cancer. Denies vulvar cancer or pre-cancer. Denies vaginal cancer or pre-cancer. Denies breast cancer. Denies colon cancer.  Sexual Activity History:   reports that she is not currently sexually active. She reports using the following method of birth control/protection: Abstinence.         HPI:   50 y.o.  No LMP recorded (lmp unknown). Patient is postmenopausal.    Patient is  here for her annual gynecologic exam.  She has external irritation on and off no discharge. Uses Ivory body wash. She denies bladder, bowel and breast complaints.    ROS:  GENERAL: No weight gain or weight loss.   CHEST: No shortness of breath.   ABDOMEN: No abdominal pain, constipation, diarrhea, nausea, vomiting or rectal bleeding.   URINARY: No frequency, dysuria, hematuria, or burning on urination.  REPRODUCTIVE: See HPI.   BREASTS: No breast pain, lumps, or nipple discharge.   PSYCHIATRIC: No depression or anxiety.       PE:   /64   Wt 97.8 kg (215 lb 9 oz)   LMP  (LMP Unknown)   BMI 35.87 kg/m²   APPEARANCE: Well nourished, well developed, in no acute distress.  NECK: Neck symmetric without  thyromegaly.  NODES: No inguinal, cervical lymph node enlargement.  CHEST: Lungs clear to auscultation.  HEART: Regular rate and rhythm, no murmurs, rubs or gallops.  ABDOMEN: Soft. No tenderness or masses. No hernias.  BREASTS: Symmetrical, no skin changes or visible lesions. No palpable masses, nipple discharge or adenopathy bilaterally.  PELVIC:    VULVA: No lesions but hypopigmentation of the labia minora. Normal female genitalia.  URETHRAL MEATUS: Normal size and location, no lesions, no prolapse.  URETHRA: No masses, tenderness, prolapse or scarring.  VAGINA: Atrophic and not well rugated, no discharge, no significant cystocele or rectocele.  CERVIX: No lesions and discharge.   UTERUS: Normal size, regular shape, mobile, non-tender, bladder base nontender.  ADNEXA: No masses or tenderness.    PROCEDURES:  Pap smear  HRHPV    Assessment:  Normal Gynecologic Exam  Irritation--early LS of labia minora ??--treat with steroid ointment      Recommendations routine screening and no risk factors:  Mammogram at age 40  Colonoscopy age 45  Bone density age 65  Return to clinic as needed for any problems.  Return to clinic in one year. It is recommended to have a physician breast exam and pelvic exam annually. This is different than doing a Pap smear.         As of April 1, 2021, the Cures Act has been passed nationally. This new law requires that all doctors progress notes, lab results, pathology reports and radiology reports be released IMMEDIATELY to the patient in the patient portal. That means that the results are released to you at the EXACT same time they are released to me. Therefore, with all of the patients that I have I am not able to reply to each patient exactly when the results come in. So there will be a delay from when you see the results to when I see them and have time to come up with a response to send you. Also I only see these results when I am on the computer at work. So if the results come in over the weekend or after 5 pm of a work day, I will not see them until the next business day. As you can tell, this is a challenge as a physician to give every patient the quick response they hope for and deserve. So please be patient!     Thanks for understanding,

## 2025-05-11 ENCOUNTER — RESULTS FOLLOW-UP (OUTPATIENT)
Dept: OBSTETRICS AND GYNECOLOGY | Facility: CLINIC | Age: 50
End: 2025-05-11

## 2025-06-25 ENCOUNTER — TELEPHONE (OUTPATIENT)
Dept: ORTHOPEDICS | Facility: CLINIC | Age: 50
End: 2025-06-25
Payer: MEDICAID

## 2025-06-25 NOTE — TELEPHONE ENCOUNTER
Copied from CRM #[id number]. Topic: Appointments - Appointment Access  >> Jun 25, 2025  1:56 PM Sara wrote:  Type:  Appt    Who Called: Rosaura  Would the patient rather a call back or a response via [facility]? Call  Best Call Back Number: [phone]  Additional Information: Calling to schedule f/u appt

## (undated) DEVICE — DRAPE THREE-QTR REINF 53X77IN

## (undated) DEVICE — SEE MEDLINE ITEM 152572

## (undated) DEVICE — GLOVE BIOGEL PI MICRO INDIC 8

## (undated) DEVICE — PACK SURGERY START

## (undated) DEVICE — COVER OVERHEAD SURG LT BLUE

## (undated) DEVICE — GLOVE BIOGEL ORTHOPEDIC 8

## (undated) DEVICE — SYR 50ML CATH TIP

## (undated) DEVICE — SEE MEDLINE ITEM 157131

## (undated) DEVICE — DRAPE STERI U-SHAPED 47X51IN

## (undated) DEVICE — SPONGE LAP 18X18 PREWASHED

## (undated) DEVICE — ABLATOR EXTENDED LENGTH

## (undated) DEVICE — PAD ABDOMINAL 5X9 STERILE

## (undated) DEVICE — GAUZE SPONGE 4X4 12PLY

## (undated) DEVICE — DRAPE INCISE IOBAN 2 23X23IN

## (undated) DEVICE — DRAPE PLASTIC U 60X72

## (undated) DEVICE — PILLOW FACE ADLT FOAM W/VELCRO

## (undated) DEVICE — GOWN POLY REINF BRTH SLV XL

## (undated) DEVICE — SEE MEDLINE ITEM 157116

## (undated) DEVICE — SUPPORT ULNA NERVE PROTECTOR

## (undated) DEVICE — DRESSING XEROFORM FOIL PK 1X8

## (undated) DEVICE — PEROXIDE HYDROGEN 3% 16OZ

## (undated) DEVICE — PACK FLUID CONTROL SHOULDER

## (undated) DEVICE — SEE MEDLINE ITEM 146313

## (undated) DEVICE — CLOSURE SKIN STERI STRIP 1/2X4

## (undated) DEVICE — APPLICATOR CHLORAPREP ORN 26ML

## (undated) DEVICE — DRAPE TIBURON ORTHOPEDIC SPLIT

## (undated) DEVICE — GOWN POLY REINF BRTH SLV LG

## (undated) DEVICE — NDL SPINAL 18GX3.5 SPINOCAN

## (undated) DEVICE — PAD PREP CUFFED NS 24X48IN

## (undated) DEVICE — STRIP MEDI WND CLSR 1/2X4IN

## (undated) DEVICE — SEE MEDLINE ITEM 156955

## (undated) DEVICE — SUT MONOCRYL 3-0 PS-2 UND

## (undated) DEVICE — SUT 2-0 ETHILON 18 FS

## (undated) DEVICE — DRESSING XEROFORM NONADH 1X8IN

## (undated) DEVICE — DRAPE STERI-DRAPE 1000 17X11IN

## (undated) DEVICE — PACK BASIC

## (undated) DEVICE — ELECTRODE REM PLYHSV RETURN 9

## (undated) DEVICE — SEE MEDLINE ITEM 146420

## (undated) DEVICE — DRESSING AQUACEL SACRAL 9 X 9

## (undated) DEVICE — MIXER BONE CEMENT

## (undated) DEVICE — PAD PREP 50/CA

## (undated) DEVICE — SUT BLU BR 2 TAPERD NDL 1/2

## (undated) DEVICE — SEE MEDLINE ITEM 152622

## (undated) DEVICE — YANKAUER OPEN TIP W/O VENT

## (undated) DEVICE — BNDG COFLEX FOAM LF2 ST 4X5YD

## (undated) DEVICE — COVER PROXIMA MAYO STAND

## (undated) DEVICE — ADHESIVE DERMABOND ADVANCED

## (undated) DEVICE — PACK OPTIMA GEN ORTHO

## (undated) DEVICE — BLADE SURG CARBON STEEL SZ11

## (undated) DEVICE — SUPPORT SLING SHOT II MEDIUM

## (undated) DEVICE — Device

## (undated) DEVICE — TOWEL OR DISP STRL BLUE 4/PK

## (undated) DEVICE — SLING ARM FASHION NAVY X-LG

## (undated) DEVICE — GOWN POLY REINF X-LONG 2XL

## (undated) DEVICE — SEE MEDLINE ITEM 146292

## (undated) DEVICE — BLADE SURG CARBON STEEL #10

## (undated) DEVICE — MANIFOLD 4 PORT

## (undated) DEVICE — GOWN SURGICAL XX LARGE X LONG

## (undated) DEVICE — BLADE SAW SAG 25.40MM X 1.27MM

## (undated) DEVICE — MAT SUCTION PUDDLEVAC ORANGE

## (undated) DEVICE — DRESSING TRANS 4X4 TEGADERM

## (undated) DEVICE — DRAPE STERI INSTRUMENT 1018

## (undated) DEVICE — SYR 50CC LL

## (undated) DEVICE — TAPE ADH MEDIPORE 4 X 10YDS

## (undated) DEVICE — DRAPE U SPLIT SHEET 54X76IN

## (undated) DEVICE — GAUZE AVANT SPNG 4PLY STRL 4X4